# Patient Record
Sex: FEMALE | Race: WHITE | Employment: PART TIME | URBAN - NONMETROPOLITAN AREA
[De-identification: names, ages, dates, MRNs, and addresses within clinical notes are randomized per-mention and may not be internally consistent; named-entity substitution may affect disease eponyms.]

---

## 2018-08-15 ENCOUNTER — OFFICE VISIT (OUTPATIENT)
Dept: CARDIOLOGY CLINIC | Age: 58
End: 2018-08-15
Payer: COMMERCIAL

## 2018-08-15 VITALS
WEIGHT: 191.6 LBS | HEIGHT: 63 IN | SYSTOLIC BLOOD PRESSURE: 142 MMHG | DIASTOLIC BLOOD PRESSURE: 80 MMHG | HEART RATE: 81 BPM | BODY MASS INDEX: 33.95 KG/M2

## 2018-08-15 DIAGNOSIS — E78.5 HYPERLIPIDEMIA, UNSPECIFIED HYPERLIPIDEMIA TYPE: Primary | ICD-10-CM

## 2018-08-15 PROCEDURE — 3017F COLORECTAL CA SCREEN DOC REV: CPT | Performed by: INTERNAL MEDICINE

## 2018-08-15 PROCEDURE — 93000 ELECTROCARDIOGRAM COMPLETE: CPT | Performed by: INTERNAL MEDICINE

## 2018-08-15 PROCEDURE — G8417 CALC BMI ABV UP PARAM F/U: HCPCS | Performed by: INTERNAL MEDICINE

## 2018-08-15 PROCEDURE — G8427 DOCREV CUR MEDS BY ELIG CLIN: HCPCS | Performed by: INTERNAL MEDICINE

## 2018-08-15 PROCEDURE — 99204 OFFICE O/P NEW MOD 45 MIN: CPT | Performed by: INTERNAL MEDICINE

## 2018-08-15 PROCEDURE — 1036F TOBACCO NON-USER: CPT | Performed by: INTERNAL MEDICINE

## 2018-08-15 RX ORDER — EZETIMIBE 10 MG/1
10 TABLET ORAL DAILY
Qty: 30 TABLET | Refills: 3 | Status: SHIPPED | OUTPATIENT
Start: 2018-08-15 | End: 2018-10-03 | Stop reason: SDUPTHER

## 2018-08-15 RX ORDER — LOSARTAN POTASSIUM AND HYDROCHLOROTHIAZIDE 12.5; 1 MG/1; MG/1
1 TABLET ORAL DAILY
COMMUNITY
End: 2020-10-08

## 2018-08-15 RX ORDER — CYCLOBENZAPRINE HCL 10 MG
10 TABLET ORAL 3 TIMES DAILY PRN
COMMUNITY
End: 2022-08-08 | Stop reason: SDUPTHER

## 2018-08-15 NOTE — PROGRESS NOTES
Patient here as new patient-EKG done today
ALKPHOS, ALT, AST, PROT, BILITOT, BILIDIR, IBILI, LABALBU    No results found for: MG    No results found for: INR, PROTIME      No results found for: LABA1C    No results found for: TRIG, HDL, LDLCALC, LDLDIRECT, LABVLDL    No results found for: TSH      Testing Reviewed:      I have individually reviewed the cardiac test below:    ECHO: No results found for this or any previous visit. Assessment/Plan   Hyerplipidemia - likely familial given father history; elevated LDL > 190, LFTs prevent using statins. Will attempt PCSK9, based on insurance coverage. Start Zetia 10 mg q day. The patient was advised on risk/benefits of the new Rx and she agreed to proceed with the medication(s). She is working on diet and exercise. We discussed premarin and its effects on cholesterol levels but she is unable to stop it due to her post-menopausal state.     Disposition:  3 months         Electronically signed by Johanna Hooks MD   8/15/2018 at 8:44 AM

## 2018-08-21 ENCOUNTER — TELEPHONE (OUTPATIENT)
Dept: CARDIOLOGY CLINIC | Age: 58
End: 2018-08-21

## 2018-08-30 LAB
CHOLESTEROL, TOTAL: 283 MG/DL
CHOLESTEROL/HDL RATIO: ABNORMAL
HDLC SERPL-MCNC: 63 MG/DL (ref 35–70)
LDL CHOLESTEROL CALCULATED: 163 MG/DL (ref 0–160)
TRIGL SERPL-MCNC: 284 MG/DL
VLDLC SERPL CALC-MCNC: 57 MG/DL

## 2018-09-04 ENCOUNTER — TELEPHONE (OUTPATIENT)
Dept: CARDIOLOGY CLINIC | Age: 58
End: 2018-09-04

## 2018-10-03 DIAGNOSIS — E78.5 HYPERLIPIDEMIA, UNSPECIFIED HYPERLIPIDEMIA TYPE: ICD-10-CM

## 2018-10-04 DIAGNOSIS — E78.5 HYPERLIPIDEMIA, UNSPECIFIED HYPERLIPIDEMIA TYPE: ICD-10-CM

## 2018-10-05 RX ORDER — EZETIMIBE 10 MG/1
10 TABLET ORAL DAILY
Qty: 90 TABLET | Refills: 3 | Status: SHIPPED | OUTPATIENT
Start: 2018-10-05 | End: 2019-10-16 | Stop reason: SDUPTHER

## 2018-10-05 RX ORDER — EZETIMIBE 10 MG/1
10 TABLET ORAL DAILY
Qty: 90 TABLET | Refills: 3 | Status: SHIPPED | OUTPATIENT
Start: 2018-10-05 | End: 2019-02-20

## 2018-10-16 ENCOUNTER — OFFICE VISIT (OUTPATIENT)
Dept: CARDIOLOGY CLINIC | Age: 58
End: 2018-10-16
Payer: COMMERCIAL

## 2018-10-16 VITALS
DIASTOLIC BLOOD PRESSURE: 64 MMHG | HEIGHT: 62 IN | SYSTOLIC BLOOD PRESSURE: 130 MMHG | HEART RATE: 88 BPM | BODY MASS INDEX: 35.5 KG/M2 | WEIGHT: 192.9 LBS

## 2018-10-16 DIAGNOSIS — E78.5 HYPERLIPIDEMIA, UNSPECIFIED HYPERLIPIDEMIA TYPE: ICD-10-CM

## 2018-10-16 PROCEDURE — 1036F TOBACCO NON-USER: CPT | Performed by: INTERNAL MEDICINE

## 2018-10-16 PROCEDURE — G8428 CUR MEDS NOT DOCUMENT: HCPCS | Performed by: INTERNAL MEDICINE

## 2018-10-16 PROCEDURE — 3017F COLORECTAL CA SCREEN DOC REV: CPT | Performed by: INTERNAL MEDICINE

## 2018-10-16 PROCEDURE — G8484 FLU IMMUNIZE NO ADMIN: HCPCS | Performed by: INTERNAL MEDICINE

## 2018-10-16 PROCEDURE — 99213 OFFICE O/P EST LOW 20 MIN: CPT | Performed by: INTERNAL MEDICINE

## 2018-10-16 PROCEDURE — G8417 CALC BMI ABV UP PARAM F/U: HCPCS | Performed by: INTERNAL MEDICINE

## 2018-10-16 NOTE — PROGRESS NOTES
sore throat. Eyes: Negative for pain, discharge, redness and itching. Respiratory: Negative for apnea, cough  Gastrointestinal: Negative for blood in stool, constipation, diarrhea   Endocrine: Negative for cold intolerance, heat intolerance, polydipsia. Genitourinary: Negative for dysuria, enuresis, flank pain and hematuria. Musculoskeletal: Negative for arthralgias, joint swelling and neck pain. Neurological: Negative for numbness and headaches. Psychiatric/Behavioral: Negative for agitation, confusion, decreased concentration and dysphoric mood. Objective:     /64   Pulse 88   Ht 5' 2\" (1.575 m)   Wt 192 lb 14.4 oz (87.5 kg)   BMI 35.28 kg/m²     Wt Readings from Last 3 Encounters:   10/16/18 192 lb 14.4 oz (87.5 kg)   08/15/18 191 lb 9.6 oz (86.9 kg)     BP Readings from Last 3 Encounters:   10/16/18 130/64   08/15/18 (!) 142/80       Nursing note and vitals reviewed. Physical Exam   Constitutional: Oriented to person, place, and time. Appears well-developed and well-nourished. HENT:   Head: Normocephalic and atraumatic. Eyes: EOM are normal. Pupils are equal, round, and reactive to light. Neck: Normal range of motion. Neck supple. No JVD present. Cardiovascular: Normal rate, regular rhythm, normal heart sounds and intact distal pulses. No murmur heard. Pulmonary/Chest: Effort normal and breath sounds normal. No respiratory distress. No wheezes. No rales. Abdominal: Soft. Bowel sounds are normal. No distension. There is no tenderness. Musculoskeletal: Normal range of motion. No edema. Neurological: Alert and oriented to person, place, and time. No cranial nerve deficit. Coordination normal.   Skin: Skin is warm and dry. Psychiatric: Normal mood and affect.        No results found for: CKTOTAL, CKMB, CKMBINDEX    No results found for: WBC, RBC, HGB, HCT, MCV, MCH, MCHC, RDW, PLT, MPV    No results found for: NA, K, CL, CO2, BUN, LABALBU, CREATININE, CALCIUM, GFRAA, LABGLOM, GLUCOSE    No results found for: ALKPHOS, ALT, AST, PROT, BILITOT, BILIDIR, IBILI, LABALBU    No results found for: MG    No results found for: INR, PROTIME      No results found for: LABA1C    Lab Results   Component Value Date    TRIG 284 08/30/2018    HDL 63 08/30/2018    LDLCALC 163 08/30/2018       No results found for: TSH      Testing Reviewed:      I have individually reviewed the cardiac test below:    ECHO: No results found for this or any previous visit. Assessment/Plan   HLD - continue Zetia.  (previously 227) and  (320). Awaiting Repatha. She likely has familial HLD given that her LDL and TG were high without any other risk factors. Discussed diet/exercise/BP/weight loss/health lifestyle choices/lipids; the patient understands the goals and will try to comply.       Disposition:  3-6 months         Electronically signed by Oumar Lester MD   10/16/2018 at 2:34 PM

## 2018-10-18 DIAGNOSIS — E78.5 HYPERLIPIDEMIA, UNSPECIFIED HYPERLIPIDEMIA TYPE: ICD-10-CM

## 2018-10-22 ENCOUNTER — TELEPHONE (OUTPATIENT)
Dept: CARDIOLOGY CLINIC | Age: 58
End: 2018-10-22

## 2018-10-22 DIAGNOSIS — E78.5 HYPERLIPIDEMIA, UNSPECIFIED HYPERLIPIDEMIA TYPE: Primary | ICD-10-CM

## 2018-10-25 NOTE — TELEPHONE ENCOUNTER
Spoke with [facility] and pt has a high copay   Spoke with pt and she is going to contact [facility] and ask about copay assistance

## 2019-02-20 ENCOUNTER — OFFICE VISIT (OUTPATIENT)
Dept: CARDIOLOGY CLINIC | Age: 59
End: 2019-02-20
Payer: COMMERCIAL

## 2019-02-20 VITALS
DIASTOLIC BLOOD PRESSURE: 88 MMHG | HEART RATE: 100 BPM | HEIGHT: 62 IN | WEIGHT: 187 LBS | SYSTOLIC BLOOD PRESSURE: 144 MMHG | BODY MASS INDEX: 34.41 KG/M2

## 2019-02-20 DIAGNOSIS — E78.5 HYPERLIPIDEMIA, UNSPECIFIED HYPERLIPIDEMIA TYPE: Primary | ICD-10-CM

## 2019-02-20 PROCEDURE — 3017F COLORECTAL CA SCREEN DOC REV: CPT | Performed by: INTERNAL MEDICINE

## 2019-02-20 PROCEDURE — G8417 CALC BMI ABV UP PARAM F/U: HCPCS | Performed by: INTERNAL MEDICINE

## 2019-02-20 PROCEDURE — 99212 OFFICE O/P EST SF 10 MIN: CPT | Performed by: INTERNAL MEDICINE

## 2019-02-20 PROCEDURE — G8484 FLU IMMUNIZE NO ADMIN: HCPCS | Performed by: INTERNAL MEDICINE

## 2019-02-20 PROCEDURE — 1036F TOBACCO NON-USER: CPT | Performed by: INTERNAL MEDICINE

## 2019-02-20 PROCEDURE — G8427 DOCREV CUR MEDS BY ELIG CLIN: HCPCS | Performed by: INTERNAL MEDICINE

## 2019-09-12 DIAGNOSIS — E78.5 HYPERLIPIDEMIA, UNSPECIFIED HYPERLIPIDEMIA TYPE: ICD-10-CM

## 2019-09-12 RX ORDER — EZETIMIBE 10 MG/1
TABLET ORAL
Qty: 90 TABLET | Refills: 3 | Status: SHIPPED | OUTPATIENT
Start: 2019-09-12 | End: 2019-10-16 | Stop reason: SDUPTHER

## 2019-10-16 ENCOUNTER — OFFICE VISIT (OUTPATIENT)
Dept: CARDIOLOGY CLINIC | Age: 59
End: 2019-10-16
Payer: COMMERCIAL

## 2019-10-16 VITALS
SYSTOLIC BLOOD PRESSURE: 122 MMHG | BODY MASS INDEX: 34.93 KG/M2 | HEART RATE: 88 BPM | WEIGHT: 189.8 LBS | DIASTOLIC BLOOD PRESSURE: 62 MMHG | HEIGHT: 62 IN

## 2019-10-16 DIAGNOSIS — E78.5 HYPERLIPIDEMIA, UNSPECIFIED HYPERLIPIDEMIA TYPE: ICD-10-CM

## 2019-10-16 PROCEDURE — 93000 ELECTROCARDIOGRAM COMPLETE: CPT | Performed by: INTERNAL MEDICINE

## 2019-10-16 PROCEDURE — G8427 DOCREV CUR MEDS BY ELIG CLIN: HCPCS | Performed by: INTERNAL MEDICINE

## 2019-10-16 PROCEDURE — 1036F TOBACCO NON-USER: CPT | Performed by: INTERNAL MEDICINE

## 2019-10-16 PROCEDURE — G8417 CALC BMI ABV UP PARAM F/U: HCPCS | Performed by: INTERNAL MEDICINE

## 2019-10-16 PROCEDURE — G8484 FLU IMMUNIZE NO ADMIN: HCPCS | Performed by: INTERNAL MEDICINE

## 2019-10-16 PROCEDURE — 99213 OFFICE O/P EST LOW 20 MIN: CPT | Performed by: INTERNAL MEDICINE

## 2019-10-16 PROCEDURE — 3017F COLORECTAL CA SCREEN DOC REV: CPT | Performed by: INTERNAL MEDICINE

## 2019-10-16 RX ORDER — EZETIMIBE 10 MG/1
10 TABLET ORAL DAILY
Qty: 90 TABLET | Refills: 3 | Status: SHIPPED | OUTPATIENT
Start: 2019-10-16 | End: 2020-09-21 | Stop reason: SDUPTHER

## 2019-10-18 DIAGNOSIS — E78.5 HYPERLIPIDEMIA, UNSPECIFIED HYPERLIPIDEMIA TYPE: ICD-10-CM

## 2019-11-11 DIAGNOSIS — E78.5 HYPERLIPIDEMIA, UNSPECIFIED HYPERLIPIDEMIA TYPE: ICD-10-CM

## 2020-05-04 ENCOUNTER — HOSPITAL ENCOUNTER (OUTPATIENT)
Dept: CT IMAGING | Age: 60
Discharge: HOME | End: 2020-05-04
Payer: COMMERCIAL

## 2020-05-04 PROCEDURE — 74176 CT ABD & PELVIS W/O CONTRAST: CPT

## 2020-06-22 NOTE — TELEPHONE ENCOUNTER
Pt received a letter from her insurance [employer] that they are no longer covering the [address]  any longer and recommended she go to  [facility].   Please advise pt at [phone]

## 2020-06-23 RX ORDER — ALIROCUMAB 75 MG/ML
75 INJECTION, SOLUTION SUBCUTANEOUS
COMMUNITY
End: 2020-06-23 | Stop reason: SDUPTHER

## 2020-06-23 RX ORDER — ALIROCUMAB 75 MG/ML
75 INJECTION, SOLUTION SUBCUTANEOUS
Qty: 2 PEN | Refills: 11 | Status: SHIPPED | OUTPATIENT
Start: 2020-06-23 | End: 2021-06-29 | Stop reason: SDUPTHER

## 2020-09-22 ENCOUNTER — TELEPHONE (OUTPATIENT)
Dept: CARDIOLOGY CLINIC | Age: 60
End: 2020-09-22

## 2020-09-22 ENCOUNTER — OFFICE VISIT (OUTPATIENT)
Dept: CARDIOLOGY CLINIC | Age: 60
End: 2020-09-22
Payer: COMMERCIAL

## 2020-09-22 VITALS
DIASTOLIC BLOOD PRESSURE: 74 MMHG | HEART RATE: 92 BPM | HEIGHT: 62 IN | SYSTOLIC BLOOD PRESSURE: 146 MMHG | WEIGHT: 191 LBS | BODY MASS INDEX: 35.15 KG/M2

## 2020-09-22 LAB
CHOLESTEROL, TOTAL: 239 MG/DL
CHOLESTEROL/HDL RATIO: NORMAL
HDLC SERPL-MCNC: 58 MG/DL (ref 35–70)
LDL CHOLESTEROL CALCULATED: 125 MG/DL (ref 0–160)
NONHDLC SERPL-MCNC: NORMAL MG/DL
TRIGL SERPL-MCNC: 317 MG/DL
VLDLC SERPL CALC-MCNC: 56 MG/DL

## 2020-09-22 PROCEDURE — 99212 OFFICE O/P EST SF 10 MIN: CPT | Performed by: INTERNAL MEDICINE

## 2020-09-22 PROCEDURE — 93000 ELECTROCARDIOGRAM COMPLETE: CPT | Performed by: INTERNAL MEDICINE

## 2020-09-22 RX ORDER — EZETIMIBE 10 MG/1
10 TABLET ORAL DAILY
Qty: 7 TABLET | Refills: 0 | Status: SHIPPED | OUTPATIENT
Start: 2020-09-22 | End: 2020-09-30 | Stop reason: SDUPTHER

## 2020-09-22 NOTE — PROGRESS NOTES
[address] [place] [address] [address]  [place] [address]  Dept: [phone]  Dept Fax: [phone]  Loc: [phone]    Visit Date: 2020    Ms. Rosamaria Garcia is a 63 y.o. female  who presented for:  Chief Complaint   Patient presents with    1 Year Follow Up       HPI:   HPI   62 yo F with Praluent/Zetia who presents for follow-up  TG and LDL elevated. Just started the second round. She is working on diet and exercise. No chest pain, angina, FRANCES, orthopnea, PND, sob at rest, palpitations, LE edema, or syncope. Current Outpatient Medications:     ezetimibe (ZETIA) 10 MG tablet, Take 1 tablet by mouth daily, Disp: 7 tablet, Rfl: 0    alirocumab (PRALUENT) 75 MG/ML SOAJ injection pen, Inject 1 mL into the skin every 14 days, Disp: 2 pen, Rfl: 11    estrogens, conjugated, (PREMARIN) 0.3 MG tablet, Take 0.3 mg by mouth daily, Disp: , Rfl:     cyclobenzaprine (FLEXERIL) 10 MG tablet, Take 10 mg by mouth 3 times daily as needed for Muscle spasms, Disp: , Rfl:     losartan-hydrochlorothiazide (HYZAAR) 100-12.5 MG per tablet, Take 1 tablet by mouth daily, Disp: , Rfl:     Past Medical History  Che Walton  has a past medical history of Hyperlipidemia and Hypertension. Social History  Mchugh  reports that she has never smoked. She has never used smokeless tobacco. She reports that she does not drink alcohol or use drugs. Family History  Mchugh family history is not on file. There is no family history of bicuspid aortic valve, aneurysms, heart transplant, pacemakers, defibrillators, or sudden cardiac death. Past Surgical History   Past Surgical History:   Procedure Laterality Date     SECTION       x 3    CHOLECYSTECTOMY      HYSTERECTOMY      JOINT REPLACEMENT Bilateral        Review of Systems   Constitutional: Negative for chills and fever  HENT: Negative for congestion, sinus pressure, sneezing and sore throat.     Eyes: Negative for pain, discharge, redness and itching. Respiratory: Negative for apnea, cough  Gastrointestinal: Negative for blood in stool, constipation, diarrhea   Endocrine: Negative for cold intolerance, heat intolerance, polydipsia. Genitourinary: Negative for dysuria, enuresis, flank pain and hematuria. Musculoskeletal: Negative for arthralgias, joint swelling and neck pain. Neurological: Negative for numbness and headaches. Psychiatric/Behavioral: Negative for agitation, confusion, decreased concentration and dysphoric mood. Objective:     BP (!) 146/74   Pulse 92   Ht 5' 2\" (1.575 m)   Wt 191 lb (86.6 kg)   BMI 34.93 kg/m²     Wt Readings from Last 3 Encounters:   09/22/20 191 lb (86.6 kg)   10/16/19 189 lb 12.8 oz (86.1 kg)   02/20/19 187 lb (84.8 kg)     BP Readings from Last 3 Encounters:   09/22/20 (!) 146/74   10/16/19 122/62   02/20/19 (!) 144/88       Nursing note and vitals reviewed. Physical Exam   Constitutional: Oriented to person, place, and time. Appears well-developed and well-nourished. HENT:   Head: Normocephalic and atraumatic. Eyes: EOM are normal. Pupils are equal, round, and reactive to light. Neck: Normal range of motion. Neck supple. No JVD present. Cardiovascular: Normal rate, regular rhythm, normal heart sounds and intact distal pulses. No murmur heard. Pulmonary/Chest: Effort normal and breath sounds normal. No respiratory distress. No wheezes. No rales. Abdominal: Soft. Bowel sounds are normal. No distension. There is no tenderness. Musculoskeletal: Normal range of motion. No edema. Neurological: Alert and oriented to person, place, and time. No cranial nerve deficit. Coordination normal.   Skin: Skin is warm and dry. Psychiatric: Normal mood and affect.        No results found for: CKTOTAL, CKMB, CKMBINDEX    No results found for: WBC, RBC, HGB, HCT, MCV, MCH, MCHC, RDW, PLT, MPV    No results found for: NA, K, CL, CO2, BUN, LABALBU, CREATININE, CALCIUM, GFRAA, LABGLOM, GLUCOSE    No results found for: ALKPHOS, ALT, AST, PROT, BILITOT, BILIDIR, IBILI, LABALBU    No results found for: MG    No results found for: INR, PROTIME      No results found for: LABA1C    Lab Results   Component Value Date    TRIG 317 09/22/2020    HDL 58 09/22/2020    LDLCALC 125 09/22/2020       No results found for: TSH      Testing Reviewed:      I have individually reviewed the cardiac test below:    ECHO: No results found for this or any previous visit. Assessment/Plan   HLD - Now on Praluent/Zetia, will continue to follow, re-check FLP, she is very stressed, BP relatively controlled but she is stressed. Discussed diet/exercise/BP/weight loss/health lifestyle choices/lipids; the patient understands the goals and will try to comply.     Disposition:  12 months         Electronically signed by Anjel Azevedo MD   9/22/2020 at 1:55 PM EDT

## 2020-09-30 RX ORDER — EZETIMIBE 10 MG/1
10 TABLET ORAL DAILY
Qty: 90 TABLET | Refills: 3 | Status: SHIPPED | OUTPATIENT
Start: 2020-09-30 | End: 2021-09-17 | Stop reason: SDUPTHER

## 2020-10-06 ENCOUNTER — HOSPITAL ENCOUNTER (OUTPATIENT)
Dept: GENERAL RADIOLOGY | Age: 60
Discharge: HOME | End: 2020-10-06
Payer: COMMERCIAL

## 2020-10-06 ENCOUNTER — HOSPITAL ENCOUNTER (OUTPATIENT)
Age: 60
Setting detail: SPECIMEN
Discharge: HOME | End: 2020-10-06
Payer: COMMERCIAL

## 2020-10-06 ENCOUNTER — HOSPITAL ENCOUNTER (OUTPATIENT)
Age: 60
Discharge: HOME | End: 2020-10-06
Payer: COMMERCIAL

## 2020-10-06 PROCEDURE — 71046 X-RAY EXAM CHEST 2 VIEWS: CPT

## 2020-10-06 PROCEDURE — U0003 INFECTIOUS AGENT DETECTION BY NUCLEIC ACID (DNA OR RNA); SEVERE ACUTE RESPIRATORY SYNDROME CORONAVIRUS 2 (SARS-COV-2) (CORONAVIRUS DISEASE [COVID-19]), AMPLIFIED PROBE TECHNIQUE, MAKING USE OF HIGH THROUGHPUT TECHNOLOGIES AS DESCRIBED BY CMS-2020-01-R: HCPCS

## 2020-10-08 LAB — SARS-COV-2: NOT DETECTED

## 2020-10-08 RX ORDER — LOSARTAN POTASSIUM 100 MG/1
100 TABLET ORAL DAILY
COMMUNITY
End: 2021-08-05

## 2020-10-08 RX ORDER — HYDROCHLOROTHIAZIDE 12.5 MG/1
12.5 CAPSULE, GELATIN COATED ORAL DAILY
COMMUNITY
End: 2021-05-20

## 2020-10-08 RX ORDER — MONTELUKAST SODIUM 10 MG/1
10 TABLET ORAL NIGHTLY
COMMUNITY
End: 2021-09-17 | Stop reason: SDUPTHER

## 2020-10-08 NOTE — PROGRESS NOTES
Covid screening questionnaire complete and negative for symptoms or exposure see chart for documentation  covid test completed - pt is negative

## 2020-10-08 NOTE — PROGRESS NOTES
NPO after midnight except sip of water with heart/BP meds  Follow all instructions given by surgeon including medications to hold  Bring insurance card and photo ID  Shower the night before or morning of procedure with liquid antibacterial soap  Wear comfortable clothing  Do not bring jewelry or valuables  Bring list of medications with dosage and how often taken if not reviewed   needed at discharge at least 25years old  Call PAT at [phone] for questions    Instructed to call surgery center at [phone] upon arrival to speak with  before entering building. Covid screen due  at [facility] 6 to 7 days before procedure. Xray reveals weakening of the bones and arthritis. Jaclyn Nice to have DEXA scan to make sure no osteoporosis (which can increase risk of fracture). DEXA scan order place last spring. Please place order for DEXA dx: osteopenia  How is patient's pain doing?

## 2020-10-12 ENCOUNTER — ANESTHESIA EVENT (OUTPATIENT)
Dept: OPERATING ROOM | Age: 60
End: 2020-10-12
Payer: COMMERCIAL

## 2020-10-13 ENCOUNTER — HOSPITAL ENCOUNTER (OUTPATIENT)
Age: 60
Setting detail: OUTPATIENT SURGERY
Discharge: HOME | End: 2020-10-13
Attending: OPHTHALMOLOGY | Admitting: OPHTHALMOLOGY
Payer: COMMERCIAL

## 2020-10-13 ENCOUNTER — ANESTHESIA (OUTPATIENT)
Dept: OPERATING ROOM | Age: 60
End: 2020-10-13
Payer: COMMERCIAL

## 2020-10-13 VITALS
OXYGEN SATURATION: 98 % | SYSTOLIC BLOOD PRESSURE: 122 MMHG | TEMPERATURE: 97.7 F | DIASTOLIC BLOOD PRESSURE: 60 MMHG | RESPIRATION RATE: 30 BRPM

## 2020-10-13 VITALS
HEART RATE: 89 BPM | DIASTOLIC BLOOD PRESSURE: 56 MMHG | BODY MASS INDEX: 35.44 KG/M2 | RESPIRATION RATE: 12 BRPM | WEIGHT: 192.6 LBS | OXYGEN SATURATION: 95 % | HEIGHT: 62 IN | SYSTOLIC BLOOD PRESSURE: 118 MMHG | TEMPERATURE: 96.8 F

## 2020-10-13 PROCEDURE — 6370000000 HC RX 637 (ALT 250 FOR IP): Performed by: OPHTHALMOLOGY

## 2020-10-13 PROCEDURE — 7100000001 HC PACU RECOVERY - ADDTL 15 MIN: Performed by: OPHTHALMOLOGY

## 2020-10-13 PROCEDURE — 7100000011 HC PHASE II RECOVERY - ADDTL 15 MIN: Performed by: OPHTHALMOLOGY

## 2020-10-13 PROCEDURE — 2720000010 HC SURG SUPPLY STERILE: Performed by: OPHTHALMOLOGY

## 2020-10-13 PROCEDURE — 3700000000 HC ANESTHESIA ATTENDED CARE: Performed by: OPHTHALMOLOGY

## 2020-10-13 PROCEDURE — 3700000001 HC ADD 15 MINUTES (ANESTHESIA): Performed by: OPHTHALMOLOGY

## 2020-10-13 PROCEDURE — 2500000003 HC RX 250 WO HCPCS: Performed by: NURSE ANESTHETIST, CERTIFIED REGISTERED

## 2020-10-13 PROCEDURE — 2500000003 HC RX 250 WO HCPCS

## 2020-10-13 PROCEDURE — 6360000002 HC RX W HCPCS: Performed by: NURSE ANESTHETIST, CERTIFIED REGISTERED

## 2020-10-13 PROCEDURE — 7100000000 HC PACU RECOVERY - FIRST 15 MIN: Performed by: OPHTHALMOLOGY

## 2020-10-13 PROCEDURE — 6370000000 HC RX 637 (ALT 250 FOR IP)

## 2020-10-13 PROCEDURE — V2632 POST CHMBR INTRAOCULAR LENS: HCPCS | Performed by: OPHTHALMOLOGY

## 2020-10-13 PROCEDURE — 2500000003 HC RX 250 WO HCPCS: Performed by: OPHTHALMOLOGY

## 2020-10-13 PROCEDURE — 2709999900 HC NON-CHARGEABLE SUPPLY: Performed by: OPHTHALMOLOGY

## 2020-10-13 PROCEDURE — 7100000010 HC PHASE II RECOVERY - FIRST 15 MIN: Performed by: OPHTHALMOLOGY

## 2020-10-13 PROCEDURE — 3600000003 HC SURGERY LEVEL 3 BASE: Performed by: OPHTHALMOLOGY

## 2020-10-13 PROCEDURE — 2580000003 HC RX 258: Performed by: OPHTHALMOLOGY

## 2020-10-13 PROCEDURE — 3600000013 HC SURGERY LEVEL 3 ADDTL 15MIN: Performed by: OPHTHALMOLOGY

## 2020-10-13 PROCEDURE — 6360000002 HC RX W HCPCS: Performed by: OPHTHALMOLOGY

## 2020-10-13 DEVICE — Z DUP USE 2247921 LENS INTOCU +5.0 TO +34.0 DIOPT L13MM DIA6MM UV BLK: Type: IMPLANTABLE DEVICE | Site: EYE | Status: FUNCTIONAL

## 2020-10-13 RX ORDER — LIDOCAINE HYDROCHLORIDE 20 MG/ML
INJECTION, SOLUTION EPIDURAL; INFILTRATION; INTRACAUDAL; PERINEURAL PRN
Status: DISCONTINUED | OUTPATIENT
Start: 2020-10-13 | End: 2020-10-13 | Stop reason: SDUPTHER

## 2020-10-13 RX ORDER — APRACLONIDINE HYDROCHLORIDE 5 MG/ML
SOLUTION/ DROPS OPHTHALMIC PRN
Status: DISCONTINUED | OUTPATIENT
Start: 2020-10-13 | End: 2020-10-13 | Stop reason: ALTCHOICE

## 2020-10-13 RX ORDER — PROPOFOL 10 MG/ML
INJECTION, EMULSION INTRAVENOUS PRN
Status: DISCONTINUED | OUTPATIENT
Start: 2020-10-13 | End: 2020-10-13 | Stop reason: SDUPTHER

## 2020-10-13 RX ORDER — TROPICAMIDE 10 MG/ML
SOLUTION/ DROPS OPHTHALMIC
Status: COMPLETED
Start: 2020-10-13 | End: 2020-10-13

## 2020-10-13 RX ORDER — BUPIVACAINE HYDROCHLORIDE 7.5 MG/ML
1 INJECTION, SOLUTION EPIDURAL; RETROBULBAR EVERY 5 MIN PRN
Status: COMPLETED | OUTPATIENT
Start: 2020-10-13 | End: 2020-10-13

## 2020-10-13 RX ORDER — ONDANSETRON 2 MG/ML
INJECTION INTRAMUSCULAR; INTRAVENOUS PRN
Status: DISCONTINUED | OUTPATIENT
Start: 2020-10-13 | End: 2020-10-13 | Stop reason: SDUPTHER

## 2020-10-13 RX ORDER — KETOROLAC TROMETHAMINE 5 MG/ML
SOLUTION OPHTHALMIC
Status: COMPLETED
Start: 2020-10-13 | End: 2020-10-13

## 2020-10-13 RX ORDER — BUPIVACAINE HYDROCHLORIDE 7.5 MG/ML
INJECTION, SOLUTION EPIDURAL; RETROBULBAR
Status: COMPLETED
Start: 2020-10-13 | End: 2020-10-13

## 2020-10-13 RX ORDER — MORPHINE SULFATE 2 MG/ML
2 INJECTION, SOLUTION INTRAMUSCULAR; INTRAVENOUS EVERY 5 MIN PRN
Status: DISCONTINUED | OUTPATIENT
Start: 2020-10-13 | End: 2020-10-13 | Stop reason: HOSPADM

## 2020-10-13 RX ORDER — PHENYLEPHRINE HCL 2.5 %
1 DROPS OPHTHALMIC (EYE) EVERY 5 MIN PRN
Status: COMPLETED | OUTPATIENT
Start: 2020-10-13 | End: 2020-10-13

## 2020-10-13 RX ORDER — SODIUM CHLORIDE 9 MG/ML
INJECTION, SOLUTION INTRAVENOUS CONTINUOUS
Status: DISCONTINUED | OUTPATIENT
Start: 2020-10-13 | End: 2020-10-13 | Stop reason: HOSPADM

## 2020-10-13 RX ORDER — ACETAMINOPHEN 500 MG
1000 TABLET ORAL
Status: COMPLETED | OUTPATIENT
Start: 2020-10-13 | End: 2020-10-13

## 2020-10-13 RX ORDER — LIDOCAINE HYDROCHLORIDE 10 MG/ML
INJECTION, SOLUTION EPIDURAL; INFILTRATION; INTRACAUDAL; PERINEURAL PRN
Status: DISCONTINUED | OUTPATIENT
Start: 2020-10-13 | End: 2020-10-13 | Stop reason: ALTCHOICE

## 2020-10-13 RX ORDER — DEXAMETHASONE SODIUM PHOSPHATE 4 MG/ML
INJECTION, SOLUTION INTRA-ARTICULAR; INTRALESIONAL; INTRAMUSCULAR; INTRAVENOUS; SOFT TISSUE PRN
Status: DISCONTINUED | OUTPATIENT
Start: 2020-10-13 | End: 2020-10-13 | Stop reason: SDUPTHER

## 2020-10-13 RX ORDER — TROPICAMIDE 10 MG/ML
1 SOLUTION/ DROPS OPHTHALMIC EVERY 5 MIN PRN
Status: COMPLETED | OUTPATIENT
Start: 2020-10-13 | End: 2020-10-13

## 2020-10-13 RX ORDER — LABETALOL 20 MG/4 ML (5 MG/ML) INTRAVENOUS SYRINGE
10 EVERY 10 MIN PRN
Status: DISCONTINUED | OUTPATIENT
Start: 2020-10-13 | End: 2020-10-13 | Stop reason: HOSPADM

## 2020-10-13 RX ORDER — LATANOPROST 50 UG/ML
SOLUTION/ DROPS OPHTHALMIC PRN
Status: DISCONTINUED | OUTPATIENT
Start: 2020-10-13 | End: 2020-10-13 | Stop reason: ALTCHOICE

## 2020-10-13 RX ORDER — PHENYLEPHRINE HCL 2.5 %
DROPS OPHTHALMIC (EYE)
Status: COMPLETED
Start: 2020-10-13 | End: 2020-10-13

## 2020-10-13 RX ORDER — FENTANYL CITRATE 50 UG/ML
INJECTION, SOLUTION INTRAMUSCULAR; INTRAVENOUS PRN
Status: DISCONTINUED | OUTPATIENT
Start: 2020-10-13 | End: 2020-10-13 | Stop reason: SDUPTHER

## 2020-10-13 RX ORDER — KETOROLAC TROMETHAMINE 5 MG/ML
1 SOLUTION OPHTHALMIC EVERY 5 MIN PRN
Status: COMPLETED | OUTPATIENT
Start: 2020-10-13 | End: 2020-10-13

## 2020-10-13 RX ORDER — FENTANYL CITRATE 50 UG/ML
50 INJECTION, SOLUTION INTRAMUSCULAR; INTRAVENOUS EVERY 5 MIN PRN
Status: DISCONTINUED | OUTPATIENT
Start: 2020-10-13 | End: 2020-10-13 | Stop reason: HOSPADM

## 2020-10-13 RX ADMIN — Medication 1 DROP: at 06:42

## 2020-10-13 RX ADMIN — Medication 1 DROP: at 06:36

## 2020-10-13 RX ADMIN — PHENYLEPHRINE HYDROCHLORIDE 1 DROP: 25 SOLUTION/ DROPS OPHTHALMIC at 06:36

## 2020-10-13 RX ADMIN — ONDANSETRON HYDROCHLORIDE 4 MG: 4 INJECTION, SOLUTION INTRAMUSCULAR; INTRAVENOUS at 07:31

## 2020-10-13 RX ADMIN — KETOROLAC TROMETHAMINE 1 DROP: 5 SOLUTION OPHTHALMIC at 06:47

## 2020-10-13 RX ADMIN — FENTANYL CITRATE 50 MCG: 50 INJECTION, SOLUTION INTRAMUSCULAR; INTRAVENOUS at 07:36

## 2020-10-13 RX ADMIN — LIDOCAINE HYDROCHLORIDE 40 MG: 20 INJECTION, SOLUTION EPIDURAL; INFILTRATION; INTRACAUDAL; PERINEURAL at 07:20

## 2020-10-13 RX ADMIN — ACETAMINOPHEN 1000 MG: 500 TABLET ORAL at 08:39

## 2020-10-13 RX ADMIN — FENTANYL CITRATE 50 MCG: 50 INJECTION, SOLUTION INTRAMUSCULAR; INTRAVENOUS at 07:18

## 2020-10-13 RX ADMIN — Medication 1 DROP: at 06:47

## 2020-10-13 RX ADMIN — PROPOFOL 30 MG: 10 INJECTION, EMULSION INTRAVENOUS at 07:31

## 2020-10-13 RX ADMIN — TROPICAMIDE 1 DROP: 10 SOLUTION/ DROPS OPHTHALMIC at 06:51

## 2020-10-13 RX ADMIN — PROPOFOL 150 MG: 10 INJECTION, EMULSION INTRAVENOUS at 07:20

## 2020-10-13 RX ADMIN — TROPICAMIDE 1 DROP: 10 SOLUTION/ DROPS OPHTHALMIC at 06:36

## 2020-10-13 RX ADMIN — KETOROLAC TROMETHAMINE 1 DROP: 5 SOLUTION OPHTHALMIC at 06:36

## 2020-10-13 RX ADMIN — BUPIVACAINE HYDROCHLORIDE 0.38 MG: 7.5 INJECTION, SOLUTION EPIDURAL; RETROBULBAR at 06:42

## 2020-10-13 RX ADMIN — DEXAMETHASONE SODIUM PHOSPHATE 8 MG: 4 INJECTION, SOLUTION INTRAMUSCULAR; INTRAVENOUS at 07:31

## 2020-10-13 RX ADMIN — TROPICAMIDE 1 DROP: 10 SOLUTION/ DROPS OPHTHALMIC at 06:57

## 2020-10-13 RX ADMIN — SODIUM CHLORIDE: 9 INJECTION, SOLUTION INTRAVENOUS at 07:16

## 2020-10-13 RX ADMIN — BUPIVACAINE HYDROCHLORIDE 0.38 MG: 7.5 INJECTION, SOLUTION EPIDURAL; RETROBULBAR at 06:47

## 2020-10-13 RX ADMIN — BUPIVACAINE HYDROCHLORIDE 0.38 MG: 7.5 INJECTION, SOLUTION EPIDURAL; RETROBULBAR at 06:36

## 2020-10-13 RX ADMIN — TROPICAMIDE 1 DROP: 10 SOLUTION/ DROPS OPHTHALMIC at 06:47

## 2020-10-13 RX ADMIN — KETOROLAC TROMETHAMINE 1 DROP: 5 SOLUTION/ DROPS OPHTHALMIC at 06:36

## 2020-10-13 RX ADMIN — KETOROLAC TROMETHAMINE 1 DROP: 5 SOLUTION OPHTHALMIC at 06:42

## 2020-10-13 RX ADMIN — TROPICAMIDE 1 DROP: 10 SOLUTION/ DROPS OPHTHALMIC at 06:42

## 2020-10-13 ASSESSMENT — PAIN SCALES - GENERAL
PAINLEVEL_OUTOF10: 2
PAINLEVEL_OUTOF10: 0

## 2020-10-13 ASSESSMENT — PAIN - FUNCTIONAL ASSESSMENT: PAIN_FUNCTIONAL_ASSESSMENT: 0-10

## 2020-10-13 NOTE — PROGRESS NOTES
Discharge instruction sheet left here. This RN spoke with daughter and patient states she does not need the instructions.

## 2020-10-13 NOTE — OP NOTE
Operative Note      Patient: Ashley Andrade  YOB: 1960  MRN: [id number]    Date of Procedure: 10/13/2020    Pre-Op Diagnosis: LEFT EYE CATARACT    Post-Op Diagnosis: Same       Procedure(s):  LEFT EYE CATARACT    Surgeon(s):  Sanjana Khoury MD    Assistant:   * No surgical staff found *    Anesthesia: General    Estimated Blood Loss (mL): Minimal    Complications: None    Specimens:   * No specimens in log *    Implants:  Implant Name Type Inv.  Item Serial No.  Lot No. LRB No. Used Action   LENS IOL Keyur Seis 1266 1P TECNIS [id number] - [id number] Eye LENS IOL Keyur Seis 1266 [facility] [id number] [employer]  Left 1 Implanted         Drains: * No LDAs found *    Findings: none    Detailed Description of Procedure:   See procedure note    Electronically signed by Sanjana Khoury MD on 10/13/2020 at 7:53 AM

## 2020-10-13 NOTE — ANESTHESIA PRE PROCEDURE
Department of Anesthesiology  Preprocedure Note       Name:  Helen Mcallister   Age:  63 y.o.  :  1960                                          MRN:  [id number]         Date:  10/13/2020      Surgeon: Zan Dave):  Anjali Curiel MD    Procedure: Procedure(s):  LEFT EYE CATARACT    Medications prior to admission:   Prior to Admission medications    Medication Sig Start Date End Date Taking? Authorizing Provider   losartan (COZAAR) 100 MG tablet Take 100 mg by mouth daily   Yes Historical Provider, MD   hydroCHLOROthiazide (MICROZIDE) 12.5 MG capsule Take 12.5 mg by mouth daily   Yes Historical Provider, MD   montelukast (SINGULAIR) 10 MG tablet Take 10 mg by mouth nightly   Yes Historical Provider, MD   ezetimibe (ZETIA) 10 MG tablet Take 1 tablet by mouth daily 20  Yes Cali Quarles MD   alirocumab (PRALUENT) 75 MG/ML SOAJ injection pen Inject 1 mL into the skin every 14 days 20  Yes Viktoria Salgado PA-C   estrogens, conjugated, (PREMARIN) 0.3 MG tablet Take 0.3 mg by mouth 2x week   Yes Historical Provider, MD   cyclobenzaprine (FLEXERIL) 10 MG tablet Take 10 mg by mouth 3 times daily as needed for Muscle spasms   Yes Historical Provider, MD       Current medications:    Current Facility-Administered Medications   Medication Dose Route Frequency Provider Last Rate Last Dose    acetaminophen (TYLENOL) tablet 1,000 mg  1,000 mg Oral Once PRN Anjali Curiel MD        0.9 % sodium chloride infusion   Intravenous Continuous Anjali Curiel MD           Allergies: Allergies   Allergen Reactions    Valium [Diazepam] Other (See Comments)     Patient gets very irritated       Problem List:  There is no problem list on file for this patient.       Past Medical History:        Diagnosis Date    Hyperlipidemia     Hypertension        Past Surgical History:        Procedure Laterality Date     SECTION       x 3     SECTION      x3    CHOLECYSTECTOMY      HYSTERECTOMY opening: > = 3 FB Dental:          Pulmonary: breath sounds clear to auscultation                             Cardiovascular:    (+) hypertension:,         Rhythm: regular                      Neuro/Psych:               GI/Hepatic/Renal:   (+) morbid obesity          Endo/Other:                     Abdominal:   (+) obese,         Vascular:                                        Anesthesia Plan      general     ASA 2       Induction: intravenous. MIPS: Postoperative opioids intended and Prophylactic antiemetics administered. Anesthetic plan and risks discussed with patient and child/children. Plan discussed with CRNA.                   Francine Mcgill MD   10/13/2020

## 2020-10-13 NOTE — ANESTHESIA PRE-OP
[address] [address]  Pre-Sedation/Analgesia History & Physical    Pt Name: Mouna Robertson  MRN: [id number]  YOB: 1960  Provider Performing Procedure: Aguilar Noe  Primary Care Physician: Sylvia Ramirez,     PRE-PROCEDURE   DNR-CCA/DNR-CC : No  Brief History/Pre-Procedure Diagnosis: Combined cataract left eye      MEDICAL HISTORY       has a past medical history of Hyperlipidemia and Hypertension. SURGICAL HISTORY   has a past surgical history that includes  section; Hysterectomy; Cholecystectomy; joint replacement (Bilateral); and  section. ALLERGIES   Allergies as of 10/01/2020 - Review Complete 2020   Allergen Reaction Noted    Valium [diazepam] Other (See Comments) 10/16/2018       MEDICATIONS   Coumadin Use Last 7 Days No  Antiplatelet drug therapy use last 7 days  No  Other anticoagulant use last 7 days  No  Prior to Admission medications    Medication Sig Start Date End Date Taking?  Authorizing Provider   losartan (COZAAR) 100 MG tablet Take 100 mg by mouth daily   Yes Historical Provider, MD   hydroCHLOROthiazide (MICROZIDE) 12.5 MG capsule Take 12.5 mg by mouth daily   Yes Historical Provider, MD   montelukast (SINGULAIR) 10 MG tablet Take 10 mg by mouth nightly   Yes Historical Provider, MD   ezetimibe (ZETIA) 10 MG tablet Take 1 tablet by mouth daily 20  Yes Claude Salinas, MD   alirocumab (PRALUENT) 75 MG/ML SOAJ injection pen Inject 1 mL into the skin every 14 days 20  Yes Rozina Sosa PA-C   estrogens, conjugated, (PREMARIN) 0.3 MG tablet Take 0.3 mg by mouth 2x week   Yes Historical Provider, MD   cyclobenzaprine (FLEXERIL) 10 MG tablet Take 10 mg by mouth 3 times daily as needed for Muscle spasms   Yes Historical Provider, MD     PHYSICAL:   Vitals:    10/13/20 0632   BP: (!) 145/73   Pulse: 72   Resp: 16   Temp: 96.6 °F (35.9 °C)   SpO2: 98%     Mental Status: alert and oriented   Heart:  Regular rate and rhythm    Lungs:  Clear to ausculation   Abdomen: soft, non tender   PLANNED PROCEDURE   Phacoemulsification cataract extraction with intraocular lens, left eye   SEDATION  Planned agent:general anesthesia  ASA Classification: 2  Class 1: A normal healthy patient  Class 2: Pt with mild to moderate systemic disease  Class 3: Severe systemic disease or disturbance  Class 4: Severe systemic disorders that are already life threatening. Class 5: Moribund pt with little chances of survival, for more than 24 hours. Mallampati I Airway Classification: 3    1. Pre-procedure diagnostic studies complete and results available. Comment:    2. Previous sedation/anesthesia experiences assessed. Comment:    3. The patient is an appropriate candidate to undergo the planned procedure sedation and anesthesia. (Refer to nursing sedation/analgesia documentation record)  4. Formulation and discussion of sedation/procedure plan, risks, and expectations with patient and/or responsible adult completed. 5. Patient examined immediately prior to the procedure.  (Refer to nursing sedation/analgesia documentation record)    Yayo Henriquez  Electronically signed 10/13/2020 at 7:14 AM

## 2020-10-13 NOTE — ANESTHESIA POSTPROCEDURE EVALUATION
Department of Anesthesiology  Postprocedure Note    Patient: Eunice Kinney  MRN: [id number]  YOB: 1960  Date of evaluation: 10/13/2020  Time:  11:25 AM     Procedure Summary     Date:  10/13/20 Room / Location:  [address] [place] [address] 04 / 138 [facility]    Anesthesia Start:  5825 Anesthesia Stop:  0800    Procedure:  EXTRACAPSULAR CATARACT REMOVAL WITH INSERTION OF INTRAOCULAR LENS PROTHESIS LEFT (Left Eye) Diagnosis:  (LEFT EYE CATARACT)    Surgeon:  Garrison Worrell MD Responsible Provider:  Samm Smith MD    Anesthesia Type:  general ASA Status:  2          Anesthesia Type: general    Brandy Phase I: Brandy Score: 10    Brandy Phase II: Brandy Score: 10    Last vitals: Reviewed and per EMR flowsheets.        Anesthesia Post Evaluation    Patient location during evaluation: PACU  Patient participation: complete - patient participated  Level of consciousness: awake  Airway patency: patent  Nausea & Vomiting: no vomiting and no nausea  Complications: no  Cardiovascular status: hemodynamically stable  Respiratory status: acceptable  Hydration status: stable

## 2020-10-13 NOTE — OP NOTE
[facility] [address]  RECORD OF OPERATION                       10/13/2020    Patient: Nely Tucker  : 1960  Acct#: [de-identified]    PRE-OPERATIVE DIAGNOSIS:  Cataract, OS    POST-OPERATIVE DIAGNOSIS:  same    OPERATION PERFORMED:  Phacoemulsification cataract extraction with posterior chamber intraocular lens, OS    SURGEON:  Alma Green MD    ANESTHESIA:  General    COMPLICATIONS:  None    ESTIMATED BLOOD LOSS: None    LENS INFORMATION: Tecnis ZCB00  Power 24.0    INDICATION AND CONSENT:  The patient was found to have a visually significant cataract affecting their activities of daily living which is not adequately correctable by a change in spectacles. The risks and options of cataract surgery including observation were discussed. Consent was obtained and the patient requests to proceed. General anesthesia was indicated due to patient's extreme claustrophobia and previous failure to carry through with procedure under topical/sedation. OPERATIVE TECHNIQUE:  In the operating room, the patient was intubated and sedated. The operative eye was prepped and draped in the usual sterile ophthalmic fashion and a lid speculum was inserted. A paracentesis incision was made at the superior temporal limbus. 0.2-0.4cc of 1% Lidocaine was instilled into the anterior chamber followed by viscoelastic. A keratome was used to produce a 2.8 mm chord-length incision beginning at the temporal limbus extending approximately 2 mm through clear cornea into the anterior chamber. A capsulorrhexis-type capsulotomy was performed. The lens nucleus was hydrodissected with balanced salt solution (BSS) and was phacoemulsified. The lens cortical material was aspirated using the automated irrigation/aspiration unit (I/A). Additional viscoelastic was instilled into the anterior segment. An intraocular lens was introduced and centered within the capsular bag.   The viscoelastic was removed and replaced with BSS using the I/A. The tension of the globe was adjusted with BSS. The incisions were watertight without suture closure. The lid speculum was removed. Post-operative drops were placed on the eye. The patient tolerated the procedure well, was awakened from anesthesia, and was taken from the operating room in good condition. Idania Castelan M.D. Operative Note      Patient: Miriam Lemus  YOB: 1960  MRN: [id number]    Date of Procedure: 10/13/2020    Pre-Op Diagnosis: LEFT EYE CATARACT    Post-Op Diagnosis: Same       Procedure(s):  EXTRACAPSULAR CATARACT REMOVAL WITH INSERTION OF INTRAOCULAR LENS PROTHESIS LEFT    Surgeon(s):  Idania Castelan MD    Assistant:   * No surgical staff found *    Anesthesia: General    Estimated Blood Loss (mL): Minimal    Complications: None    Specimens:   * No specimens in log *    Implants:  Implant Name Type Inv.  Item Serial No.  Lot No. LRB No. Used Action   LENS IOL Keyur Seis 1266 1P TECNIS ZCB00 - [id number] Eye LENS IOL Keyur Seis 1266 1PC [facility] [id number]  Left 1 Implanted         Drains: * No LDAs found *    Findings: none    Detailed Description of Procedure:   See procedure note    Electronically signed by Idania Castelan MD on 10/13/2020 at 8:00 AM

## 2020-10-13 NOTE — PROGRESS NOTES
4125-  Patient arrived to pacu via cart to bay 1. Spontaneous respirations even and unlabored. Placed on monitor--VSS. Report received from [address] [place] [address] and Surgical RN. 2061-  Assessment completed. Patient is drowsy, but responds to name and follows commands. IV infusing 0.9 in right hand-- no complications. Patient able to see from left eye without difficulty. Patient denies pain/N/V-- will monitor. SCDs applied. 0777-  Dr. Raul Medina at bedside. No new orders. 65-  Respirations even and unlabored. VSS.    4453-  Patient resting. Appears comfortable. 0820-  Respirations even and unlabored. VSS.   0825-  Reassessment completed. Patient meets criteria to be moved to phase II.   0830-  Snack and drink given to patient. Family in room. 4710-  2 Tylenol given. See MAR.   1594-  All belongings brought to room. 1539-  Patient dressing. 2353-  Discharge instructions given. Understanding verbalized. 4108-  Patient discharged in stable condition with all belongings.

## 2020-10-13 NOTE — ANESTHESIA POST-OP
[facility]  Sedation/Analgesia Post Sedation Record    Pt Name: Naveed De Oliveira  MRN: [id number]  YOB: 1960  Procedure Performed By: Claudia Canela  Primary Care Physician: Mick Cheung DO    POST-PROCEDURE    Physicians/Assistants: Claudia Canela  Procedure Performed: Phacoemulsification cataract extraction with intraocular lens, left eye   Sedation/Anesthesia: General   Estimated Blood Loss:     0  ml  Specimens Removed:  none     Disposition of Specimen: none     Complications: none     Post-procedure Diagnosis/Findings:      1. Combined cataract, left eye         Recommendations:    1.  Home when stable           Claudia Canela  Electronically signed 10/13/2020 at 7:58 AM

## 2021-05-12 LAB
CHOLESTEROL, TOTAL: 245 MG/DL
CHOLESTEROL/HDL RATIO: NORMAL
HDLC SERPL-MCNC: 59 MG/DL (ref 35–70)
LDL CHOLESTEROL CALCULATED: 147 MG/DL (ref 0–160)
NONHDLC SERPL-MCNC: NORMAL MG/DL
TRIGL SERPL-MCNC: 217 MG/DL
VLDLC SERPL CALC-MCNC: 39 MG/DL

## 2021-05-13 ENCOUNTER — TELEPHONE (OUTPATIENT)
Dept: CARDIOLOGY CLINIC | Age: 61
End: 2021-05-13

## 2021-05-13 DIAGNOSIS — E78.5 HYPERLIPIDEMIA, UNSPECIFIED HYPERLIPIDEMIA TYPE: Primary | ICD-10-CM

## 2021-05-13 LAB
AVERAGE GLUCOSE: NORMAL
HBA1C MFR BLD: 7.1 %

## 2021-05-17 RX ORDER — CHLORAL HYDRATE 500 MG
2000 CAPSULE ORAL DAILY
COMMUNITY

## 2021-05-20 ENCOUNTER — HOSPITAL ENCOUNTER (EMERGENCY)
Age: 61
Discharge: HOME | End: 2021-05-20
Attending: NURSE PRACTITIONER
Payer: COMMERCIAL

## 2021-05-20 VITALS
SYSTOLIC BLOOD PRESSURE: 116 MMHG | WEIGHT: 192 LBS | OXYGEN SATURATION: 97 % | HEART RATE: 88 BPM | BODY MASS INDEX: 34.02 KG/M2 | HEIGHT: 63 IN | RESPIRATION RATE: 16 BRPM | TEMPERATURE: 97.8 F | DIASTOLIC BLOOD PRESSURE: 57 MMHG

## 2021-05-20 DIAGNOSIS — L30.9 DERMATITIS: Primary | ICD-10-CM

## 2021-05-20 PROCEDURE — 99213 OFFICE O/P EST LOW 20 MIN: CPT

## 2021-05-20 PROCEDURE — 96372 THER/PROPH/DIAG INJ SC/IM: CPT

## 2021-05-20 PROCEDURE — 6360000002 HC RX W HCPCS: Performed by: NURSE PRACTITIONER

## 2021-05-20 PROCEDURE — 99213 OFFICE O/P EST LOW 20 MIN: CPT | Performed by: NURSE PRACTITIONER

## 2021-05-20 RX ORDER — METHYLPREDNISOLONE ACETATE 40 MG/ML
40 INJECTION, SUSPENSION INTRA-ARTICULAR; INTRALESIONAL; INTRAMUSCULAR; SOFT TISSUE ONCE
Status: COMPLETED | OUTPATIENT
Start: 2021-05-20 | End: 2021-05-20

## 2021-05-20 RX ADMIN — METHYLPREDNISOLONE ACETATE 40 MG: 40 INJECTION, SUSPENSION INTRA-ARTICULAR; INTRALESIONAL; INTRAMUSCULAR; SOFT TISSUE at 08:59

## 2021-05-20 ASSESSMENT — PAIN DESCRIPTION - PAIN TYPE: TYPE: ACUTE PAIN

## 2021-05-20 ASSESSMENT — ENCOUNTER SYMPTOMS
CHEST TIGHTNESS: 0
VOMITING: 0
SORE THROAT: 0
SHORTNESS OF BREATH: 0
NAUSEA: 0
RHINORRHEA: 0
COUGH: 0
DIARRHEA: 0

## 2021-05-20 ASSESSMENT — PAIN DESCRIPTION - DESCRIPTORS: DESCRIPTORS: DISCOMFORT;ITCHING

## 2021-05-20 NOTE — ED PROVIDER NOTES
[facility]  Urgent Care Encounter       CHIEF COMPLAINT       Chief Complaint   Patient presents with    Rash       Nurses Notes reviewed and I agree except as noted in the HPI. HISTORY OF PRESENT ILLNESS   Saeed Clay is a 63 y.o. female who presents to the [facility] urgent care for evaluation of rash. Patient has noted rash to the distal shin circumferential.  She reports noting this rash following wearing stockings while working a new job. She denies new detergents or environmental exposures. She does report that she normally has sensitive skin, and has had issues like this in the past.  She does report that she was newly diagnosed with diabetes. She reports that she starts Metformin today. She reports that her hemoglobin A1c was 7.1 at her last visit. We did discuss the fact that steroids would cause elevated blood sugars. The history is provided by the patient. No  was used. Rash  Location:  Leg  Leg rash location:  R lower leg and L lower leg  Severity:  Mild  Duration:  1 day  Timing:  Constant  Progression:  Unchanged  Chronicity:  New  Relieved by:  Nothing  Worsened by:  Nothing  Associated symptoms: no diarrhea, no fatigue, no fever, no headaches, no nausea, no shortness of breath, no sore throat and not vomiting        REVIEW OF SYSTEMS     Review of Systems   Constitutional: Negative for activity change, appetite change, chills, fatigue and fever. HENT: Negative for ear discharge, ear pain, rhinorrhea and sore throat. Respiratory: Negative for cough, chest tightness and shortness of breath. Cardiovascular: Negative for chest pain. Gastrointestinal: Negative for diarrhea, nausea and vomiting. Genitourinary: Negative for dysuria. Skin: Positive for rash. Allergic/Immunologic: Negative for environmental allergies and food allergies. Neurological: Negative for dizziness and headaches.        PAST MEDICAL HISTORY Diagnosis Date    Hyperlipidemia     Hypertension        SURGICALHISTORY     Patient  has a past surgical history that includes  section; Hysterectomy; Cholecystectomy; joint replacement (Bilateral);  section; and Intracapsular cataract extraction (Left, 10/13/2020). CURRENT MEDICATIONS       Previous Medications    ALIROCUMAB (PRALUENT) 75 MG/ML SOAJ INJECTION PEN    Inject 1 mL into the skin every 14 days    CYCLOBENZAPRINE (FLEXERIL) 10 MG TABLET    Take 10 mg by mouth 3 times daily as needed for Muscle spasms    ESTROGENS, CONJUGATED, (PREMARIN) 0.3 MG TABLET    Take 0.3 mg by mouth 2x week    EZETIMIBE (ZETIA) 10 MG TABLET    Take 1 tablet by mouth daily    LOSARTAN (COZAAR) 100 MG TABLET    Take 100 mg by mouth daily    MONTELUKAST (SINGULAIR) 10 MG TABLET    Take 10 mg by mouth nightly    OMEGA-3 FATTY ACIDS (FISH OIL) 1000 MG CAPS    Take 2,000 mg by mouth daily       ALLERGIES     Patient is is allergic to valium [diazepam]. Patients   There is no immunization history on file for this patient. FAMILY HISTORY     Patient's family history includes Heart Disease in her father; High Blood Pressure in her father. SOCIAL HISTORY     Patient  reports that she has never smoked. She has never used smokeless tobacco. She reports that she does not drink alcohol and does not use drugs. PHYSICAL EXAM     ED TRIAGE VITALS  BP: (!) 148/60, Temp: 97.8 °F (36.6 °C), Pulse: 88, Resp: 16, SpO2: 98 %,Estimated body mass index is 34.01 kg/m² as calculated from the following:    Height as of this encounter: 5' 3\" (1.6 m). Weight as of this encounter: 192 lb (87.1 kg). ,No LMP recorded. Patient has had a hysterectomy. Physical Exam  Vitals and nursing note reviewed. Constitutional:       General: She is not in acute distress. Appearance: Normal appearance. She is not ill-appearing, toxic-appearing or diaphoretic. HENT:      Head: Normocephalic.       Right Ear: Ear canal and external ear normal.      Left Ear: Ear canal and external ear normal.      Nose: Nose normal. No congestion or rhinorrhea. Mouth/Throat:      Mouth: Mucous membranes are moist.      Pharynx: Oropharynx is clear. No oropharyngeal exudate or posterior oropharyngeal erythema. Cardiovascular:      Rate and Rhythm: Normal rate. Pulses: Normal pulses. Pulmonary:      Effort: Pulmonary effort is normal. No respiratory distress. Breath sounds: No stridor. No wheezing or rhonchi. Abdominal:      General: Abdomen is flat. Bowel sounds are normal.      Palpations: Abdomen is soft. Musculoskeletal:         General: No swelling or tenderness. Normal range of motion. Cervical back: Normal range of motion. Skin:     Findings: Erythema and rash present. Neurological:      General: No focal deficit present. Mental Status: She is alert and oriented to person, place, and time. Psychiatric:         Mood and Affect: Mood normal.         Behavior: Behavior normal.         DIAGNOSTIC RESULTS     Labs:No results found for this visit on 05/20/21. IMAGING:    No orders to display         EKG: None      URGENT CARE COURSE:     Vitals:    05/20/21 0835   BP: (!) 148/60   Pulse: 88   Resp: 16   Temp: 97.8 °F (36.6 °C)   SpO2: 98%   Weight: 192 lb (87.1 kg)   Height: 5' 3\" (1.6 m)       Medications   methylPREDNISolone acetate (DEPO-MEDROL) injection 40 mg (has no administration in time range)            PROCEDURES:  None    FINAL IMPRESSION      1. Dermatitis          DISPOSITION/ PLAN     Patient's above symptoms represent contact dermatitis likely related to stockings she had used. She was provided with a Deprol Medrol injection while here. She is instructed to use over-the-counter Benadryl cream for itching. She is instructed to not wear tall socks over this for the next several days.   She does instructed to follow-up with PCP in 3 to 5 days if symptoms persist.  Patient also instructed to watch for symptoms of hyperglycemia. Patient is agreeable to the above plan and denies questions or concerns at this time.       PATIENT REFERRED TO:  DO Gerda Mckeon [facility] / Massiel Gomez [id number]      DISCHARGE MEDICATIONS:  New Prescriptions    No medications on file       Discontinued Medications    HYDROCHLOROTHIAZIDE (MICROZIDE) 12.5 MG CAPSULE    Take 12.5 mg by mouth daily       Current Discharge Medication List          NINA Crowe CNP    (Please note that portions of this note were completed with a voice recognition program. Efforts were made to edit the dictations but occasionally words are mis-transcribed.)           NINA Crowe CNP  05/20/21 0076

## 2021-05-20 NOTE — ED NOTES
Pt complains of having rash on bilat lower legs that started on yesterday. Legs red pt states they are painful and itching.      Ree Hurt RN  05/20/21 8028

## 2021-05-28 ENCOUNTER — HOSPITAL ENCOUNTER (EMERGENCY)
Age: 61
Discharge: HOME | End: 2021-05-28
Attending: NURSE PRACTITIONER
Payer: COMMERCIAL

## 2021-05-28 VITALS
HEART RATE: 76 BPM | SYSTOLIC BLOOD PRESSURE: 123 MMHG | TEMPERATURE: 98.3 F | RESPIRATION RATE: 16 BRPM | OXYGEN SATURATION: 98 % | DIASTOLIC BLOOD PRESSURE: 77 MMHG

## 2021-05-28 DIAGNOSIS — L23.89 ALLERGIC CONTACT DERMATITIS DUE TO OTHER AGENTS: Primary | ICD-10-CM

## 2021-05-28 PROCEDURE — 99213 OFFICE O/P EST LOW 20 MIN: CPT

## 2021-05-28 PROCEDURE — 99213 OFFICE O/P EST LOW 20 MIN: CPT | Performed by: NURSE PRACTITIONER

## 2021-05-28 RX ORDER — BETAMETHASONE VALERATE 0.1 %
LOTION (ML) TOPICAL
Qty: 60 ML | Refills: 0 | Status: SHIPPED | OUTPATIENT
Start: 2021-05-28

## 2021-05-28 RX ORDER — HYDROXYZINE 50 MG/1
50 TABLET, FILM COATED ORAL EVERY 8 HOURS PRN
Qty: 30 TABLET | Refills: 0 | Status: SHIPPED | OUTPATIENT
Start: 2021-05-28 | End: 2021-06-07

## 2021-05-28 RX ORDER — PREDNISONE 20 MG/1
20 TABLET ORAL 2 TIMES DAILY
Qty: 10 TABLET | Refills: 0 | Status: SHIPPED | OUTPATIENT
Start: 2021-05-28 | End: 2021-06-02

## 2021-05-28 RX ORDER — BETAMETHASONE VALERATE 0.1 %
LOTION (ML) TOPICAL
Qty: 60 ML | Refills: 0 | Status: SHIPPED | OUTPATIENT
Start: 2021-05-28 | End: 2021-05-28 | Stop reason: SDUPTHER

## 2021-05-28 RX ORDER — HYDROXYZINE 50 MG/1
50 TABLET, FILM COATED ORAL EVERY 8 HOURS PRN
Qty: 30 TABLET | Refills: 0 | Status: SHIPPED | OUTPATIENT
Start: 2021-05-28 | End: 2021-05-28 | Stop reason: SDUPTHER

## 2021-05-28 RX ORDER — PREDNISONE 20 MG/1
20 TABLET ORAL 2 TIMES DAILY
Qty: 10 TABLET | Refills: 0 | Status: SHIPPED | OUTPATIENT
Start: 2021-05-28 | End: 2021-05-28 | Stop reason: SDUPTHER

## 2021-05-28 ASSESSMENT — ENCOUNTER SYMPTOMS
COUGH: 0
CHEST TIGHTNESS: 0
VOMITING: 0
RHINORRHEA: 0
DIARRHEA: 0
SORE THROAT: 0
NAUSEA: 0
SHORTNESS OF BREATH: 0

## 2021-05-28 NOTE — ED PROVIDER NOTES
[facility]  Urgent Care Encounter       CHIEF COMPLAINT       Chief Complaint   Patient presents with    Rash     legs       Nurses Notes reviewed and I agree except as noted in the HPI. HISTORY OF PRESENT ILLNESS   Harry Valdez is a 57 y.o. female who presents to the [facility] urgent care for evaluation of rash. Patient was seen here 8 days ago for similar rash. She was given a shot of Depo-Medrol along with using over-the-counter steroid creams. She reports that the rash had improved while she was off work, but worsened after working 1-2 nights. She reports that the pants that she wears to work irritates this area. She describes the area as very itchy with mild pain described as burning. She rates the pain at 3 of 10. No other symptoms. The history is provided by the patient. No  was used. REVIEW OF SYSTEMS     Review of Systems   Constitutional: Negative for activity change, appetite change, chills, fatigue and fever. HENT: Negative for ear discharge, ear pain, rhinorrhea and sore throat. Respiratory: Negative for cough, chest tightness and shortness of breath. Cardiovascular: Negative for chest pain. Gastrointestinal: Negative for diarrhea, nausea and vomiting. Genitourinary: Negative for dysuria. Skin: Positive for rash. Allergic/Immunologic: Negative for environmental allergies and food allergies. Neurological: Negative for dizziness and headaches. PAST MEDICAL HISTORY         Diagnosis Date    Hyperlipidemia     Hypertension        SURGICALHISTORY     Patient  has a past surgical history that includes  section; Hysterectomy; Cholecystectomy; joint replacement (Bilateral);  section; and Intracapsular cataract extraction (Left, 10/13/2020).     CURRENT MEDICATIONS       Discharge Medication List as of 2021  7:39 PM      CONTINUE these medications which have NOT CHANGED    Details   metFORMIN (GLUCOPHAGE) 500 MG tablet Historical Med      Omega-3 Fatty Acids (FISH OIL) 1000 MG CAPS Take 2,000 mg by mouth dailyHistorical Med      losartan (COZAAR) 100 MG tablet Take 100 mg by mouth dailyHistorical Med      montelukast (SINGULAIR) 10 MG tablet Take 10 mg by mouth nightlyHistorical Med      ezetimibe (ZETIA) 10 MG tablet Take 1 tablet by mouth daily, Disp-90 tablet,R-3Normal      alirocumab (PRALUENT) 75 MG/ML SOAJ injection pen Inject 1 mL into the skin every 14 days, Disp-2 pen,R-11Normal      estrogens, conjugated, (PREMARIN) 0.3 MG tablet Take 0.3 mg by mouth 2x weekHistorical Med      cyclobenzaprine (FLEXERIL) 10 MG tablet Take 10 mg by mouth 3 times daily as needed for Muscle spasmsHistorical Med             ALLERGIES     Patient is is allergic to valium [diazepam]. Patients   There is no immunization history on file for this patient. FAMILY HISTORY     Patient's family history includes Heart Disease in her father; High Blood Pressure in her father. SOCIAL HISTORY     Patient  reports that she has never smoked. She has never used smokeless tobacco. She reports that she does not drink alcohol and does not use drugs. PHYSICAL EXAM     ED TRIAGE VITALS  BP: 123/77, Temp: 98.3 °F (36.8 °C), Pulse: 76, Resp: 16, SpO2: 98 %,Estimated body mass index is 34.01 kg/m² as calculated from the following:    Height as of 5/20/21: 5' 3\" (1.6 m). Weight as of 5/20/21: 192 lb (87.1 kg). ,No LMP recorded. Patient has had a hysterectomy. Physical Exam  Vitals and nursing note reviewed. Constitutional:       General: She is not in acute distress. Appearance: Normal appearance. She is not ill-appearing, toxic-appearing or diaphoretic. HENT:      Head: Normocephalic. Right Ear: Ear canal and external ear normal.      Left Ear: Ear canal and external ear normal.      Nose: Nose normal. No congestion or rhinorrhea.       Mouth/Throat:      Mouth: Mucous membranes are moist.      Pharynx: Oropharynx is clear. No oropharyngeal exudate or posterior oropharyngeal erythema. Cardiovascular:      Rate and Rhythm: Normal rate. Pulses: Normal pulses. Pulmonary:      Effort: Pulmonary effort is normal. No respiratory distress. Breath sounds: No stridor. No wheezing or rhonchi. Abdominal:      General: Abdomen is flat. Bowel sounds are normal.      Palpations: Abdomen is soft. Musculoskeletal:         General: No swelling or tenderness. Normal range of motion. Cervical back: Normal range of motion. Skin:     Findings: Rash present. Rash is papular and scaling. Neurological:      General: No focal deficit present. Mental Status: She is alert and oriented to person, place, and time. Psychiatric:         Mood and Affect: Mood normal.         Behavior: Behavior normal.         DIAGNOSTIC RESULTS     Labs:No results found for this visit on 05/28/21. IMAGING:    No orders to display         EKG: None      URGENT CARE COURSE:     Vitals:    05/28/21 1925   BP: 123/77   Pulse: 76   Resp: 16   Temp: 98.3 °F (36.8 °C)   TempSrc: Temporal   SpO2: 98%       Medications - No data to display         PROCEDURES:  None    FINAL IMPRESSION      1. Allergic contact dermatitis due to other agents          DISPOSITION/ PLAN     Patient seen and evaluated for rash. Patient had reported rash from last week had improved mildly, but worsened again. She was prescribed a 5-day course of prednisone. She is also prescribed Valisone topically for irritation. She is also prescribed Atarax for itching. She is instructed not to drink drive or operate heavy machinery following Atarax administration. She is also instructed to attempt to wear loose fitting pants. She is instructed to follow-up with PCP or [facility] in 3 to 5 days with new or worsening symptoms.       PATIENT REFERRED TO:  DO Gerda Bermudez [address] [place] [facility] [id number]      DISCHARGE MEDICATIONS:  Discharge Medication List as of 5/28/2021  7:39 PM      START taking these medications    Details   predniSONE (DELTASONE) 20 MG tablet Take 1 tablet by mouth 2 times daily for 5 days, Disp-10 tablet, R-0Normal      betamethasone valerate (VALISONE) 0.1 % lotion Apply topically 2 times daily. , Disp-60 mL, R-0, Normal      hydrOXYzine (ATARAX) 50 MG tablet Take 1 tablet by mouth every 8 hours as needed for Itching, Disp-30 tablet, R-0Normal             Discharge Medication List as of 5/28/2021  7:39 PM          Discharge Medication List as of 5/28/2021  7:39 PM          NINA Aquino CNP    (Please note that portions of this note were completed with a voice recognition program. Efforts were made to edit the dictations but occasionally words are mis-transcribed.)           NINA Aquino CNP  05/28/21 2007

## 2021-06-01 ENCOUNTER — HOSPITAL ENCOUNTER (OUTPATIENT)
Dept: WOMENS IMAGING | Age: 61
Discharge: HOME | End: 2021-06-01
Payer: COMMERCIAL

## 2021-06-01 DIAGNOSIS — Z78.0 MENOPAUSE: ICD-10-CM

## 2021-06-01 DIAGNOSIS — Z78.0 POSTMENOPAUSAL: ICD-10-CM

## 2021-06-01 PROCEDURE — 77080 DXA BONE DENSITY AXIAL: CPT

## 2021-06-08 ENCOUNTER — HOSPITAL ENCOUNTER (EMERGENCY)
Age: 61
Discharge: HOME | End: 2021-06-08
Attending: EMERGENCY MEDICINE
Payer: COMMERCIAL

## 2021-06-08 VITALS
TEMPERATURE: 95.5 F | DIASTOLIC BLOOD PRESSURE: 66 MMHG | OXYGEN SATURATION: 97 % | WEIGHT: 188 LBS | HEART RATE: 80 BPM | SYSTOLIC BLOOD PRESSURE: 130 MMHG | RESPIRATION RATE: 18 BRPM | BODY MASS INDEX: 33.3 KG/M2

## 2021-06-08 DIAGNOSIS — L03.211 FACIAL CELLULITIS: Primary | ICD-10-CM

## 2021-06-08 DIAGNOSIS — E11.69 DIABETES MELLITUS TYPE 2 IN OBESE (HCC): ICD-10-CM

## 2021-06-08 DIAGNOSIS — E66.9 DIABETES MELLITUS TYPE 2 IN OBESE (HCC): ICD-10-CM

## 2021-06-08 PROCEDURE — 99213 OFFICE O/P EST LOW 20 MIN: CPT | Performed by: EMERGENCY MEDICINE

## 2021-06-08 PROCEDURE — 99213 OFFICE O/P EST LOW 20 MIN: CPT

## 2021-06-08 RX ORDER — CLINDAMYCIN HYDROCHLORIDE 150 MG/1
150 CAPSULE ORAL 3 TIMES DAILY
Qty: 21 CAPSULE | Refills: 0 | Status: SHIPPED | OUTPATIENT
Start: 2021-06-08 | End: 2021-12-20 | Stop reason: SDUPTHER

## 2021-06-08 RX ORDER — LORATADINE 10 MG/1
10 TABLET ORAL DAILY
COMMUNITY

## 2021-06-08 ASSESSMENT — ENCOUNTER SYMPTOMS
STRIDOR: 0
TROUBLE SWALLOWING: 0
SORE THROAT: 0
VOMITING: 0
CONSTIPATION: 0
SINUS PRESSURE: 0
FACIAL SWELLING: 0
COLOR CHANGE: 1
EYE PAIN: 0
BLOOD IN STOOL: 0
BACK PAIN: 0
ABDOMINAL PAIN: 0
VOICE CHANGE: 0
DIARRHEA: 0
SHORTNESS OF BREATH: 0
EYE DISCHARGE: 0
EYE REDNESS: 0
WHEEZING: 0
NAUSEA: 0
COUGH: 0
CHOKING: 0

## 2021-06-08 ASSESSMENT — PAIN DESCRIPTION - LOCATION: LOCATION: NOSE

## 2021-06-08 ASSESSMENT — PAIN SCALES - GENERAL: PAINLEVEL_OUTOF10: 4

## 2021-06-08 NOTE — ED TRIAGE NOTES
Pt walked to room 7. Pt here with complaints of facial pressure on left side and nose sore and swollen. Nose is painful to touch. Pain started in nose a week ago.

## 2021-06-08 NOTE — ED PROVIDER NOTES
[address]       Chief Complaint   Patient presents with    Facial Pain     Nose swollen and painful and sinus pressure on left side. Pain in nose started a week ago. Nurses Notes reviewed and I agree except as noted in the HPI. HISTORY OF PRESENT ILLNESS   Antonieta Wise is a 57 y.o. female who presents with 1 week history of redness, tenderness, pain and swelling of the nose and maxillary face. She rates pain at 4/10 severity. No nasal trauma or injury. No chest pain, shortness of breath, dizziness, fever, vomiting, sore throat, headache, neck pain or neck stiffness, painful or swollen glands. Recently started Metformin for diabetes. Blood sugars have been below 120. Non-smoker. No history of MRSA    REVIEW OF SYSTEMS     Review of Systems   Constitutional: Negative for appetite change, chills, fatigue, fever and unexpected weight change. HENT: Negative for congestion, ear discharge, ear pain, facial swelling, hearing loss, nosebleeds, postnasal drip, sinus pressure, sore throat, trouble swallowing and voice change. Eyes: Negative for pain, discharge, redness and visual disturbance. Respiratory: Negative for cough, choking, shortness of breath, wheezing and stridor. Cardiovascular: Negative for chest pain and leg swelling. Gastrointestinal: Negative for abdominal pain, blood in stool, constipation, diarrhea, nausea and vomiting. Genitourinary: Negative for dysuria, flank pain, frequency, hematuria, urgency, vaginal bleeding and vaginal discharge. Musculoskeletal: Negative for arthralgias, back pain, neck pain and neck stiffness. Skin: Positive for color change. Negative for rash. Nasal pain, tenderness, redness and swelling   Neurological: Negative for dizziness, seizures, syncope, weakness, light-headedness and headaches. Hematological: Negative for adenopathy. Does not bruise/bleed easily. Psychiatric/Behavioral: Negative for confusion, sleep disturbance and suicidal ideas. The patient is not nervous/anxious. All other systems reviewed and are negative. PAST MEDICAL HISTORY         Diagnosis Date    Hyperlipidemia     Hypertension        SURGICAL HISTORY     Patient  has a past surgical history that includes  section; Hysterectomy; Cholecystectomy; joint replacement (Bilateral);  section; and Intracapsular cataract extraction (Left, 10/13/2020). CURRENT MEDICATIONS       Discharge Medication List as of 2021 10:39 AM      CONTINUE these medications which have NOT CHANGED    Details   loratadine (CLARITIN) 10 MG tablet Take 10 mg by mouth dailyHistorical Med      metFORMIN (GLUCOPHAGE) 500 MG tablet Historical Med      betamethasone valerate (VALISONE) 0.1 % lotion Apply topically 2 times daily. , Disp-60 mL, R-0, Normal      Omega-3 Fatty Acids (FISH OIL) 1000 MG CAPS Take 2,000 mg by mouth dailyHistorical Med      losartan (COZAAR) 100 MG tablet Take 100 mg by mouth dailyHistorical Med      montelukast (SINGULAIR) 10 MG tablet Take 10 mg by mouth nightlyHistorical Med      ezetimibe (ZETIA) 10 MG tablet Take 1 tablet by mouth daily, Disp-90 tablet,R-3Normal      alirocumab (PRALUENT) 75 MG/ML SOAJ injection pen Inject 1 mL into the skin every 14 days, Disp-2 pen,R-11Normal      estrogens, conjugated, (PREMARIN) 0.3 MG tablet Take 0.3 mg by mouth 2x weekHistorical Med      cyclobenzaprine (FLEXERIL) 10 MG tablet Take 10 mg by mouth 3 times daily as needed for Muscle spasmsHistorical Med             ALLERGIES     Patient is is allergic to valium [diazepam]. FAMILY HISTORY     Patient'sfamily history includes Heart Disease in her father; High Blood Pressure in her father. SOCIAL HISTORY     Patient  reports that she has never smoked. She has never used smokeless tobacco. She reports that she does not drink alcohol and does not use drugs.     PHYSICAL EXAM     ED TRIAGE VITALS  BP: 130/66, Temp: 95.5 °F (35.3 °C), Pulse: 80, Resp: 18, SpO2: 97 %  Physical Exam  Vitals and nursing note reviewed. Constitutional:       General: She is not in acute distress. Appearance: She is well-developed. She is not ill-appearing. Comments: Moist membranes, normal airway, no stridor   HENT:      Head: Normocephalic and atraumatic. Right Ear: Tympanic membrane and external ear normal.      Left Ear: Tympanic membrane and external ear normal.      Nose: No congestion or rhinorrhea. Right Sinus: Maxillary sinus tenderness present. No frontal sinus tenderness. Left Sinus: Maxillary sinus tenderness present. No frontal sinus tenderness. Comments: Warmth, erythema swelling and tenderness of the nose. No abscess. Mouth/Throat:      Pharynx: No oropharyngeal exudate or posterior oropharyngeal erythema. Comments: Oropharynx normal  Eyes:      General: No scleral icterus. Right eye: No discharge. Left eye: No discharge. Extraocular Movements:      Right eye: Normal extraocular motion. Left eye: Normal extraocular motion. Conjunctiva/sclera: Conjunctivae normal.      Pupils: Pupils are equal, round, and reactive to light. Comments: Conjunctiva clear   Neck:      Thyroid: No thyromegaly. Vascular: No JVD. Comments: No meningismus  Cardiovascular:      Rate and Rhythm: Normal rate and regular rhythm. Pulses: Normal pulses. Heart sounds: Normal heart sounds, S1 normal and S2 normal. No murmur heard. No friction rub. No gallop. Pulmonary:      Effort: Pulmonary effort is normal. No tachypnea or respiratory distress. Breath sounds: Normal breath sounds. No stridor. No decreased breath sounds, wheezing, rhonchi or rales. Comments: No cough, lungs clear  Chest:      Chest wall: No tenderness. Abdominal:      General: Bowel sounds are normal. There is no distension. Palpations: Abdomen is soft. There is no mass. Tenderness: There is no abdominal tenderness. There is no right CVA tenderness, left CVA tenderness, guarding or rebound. Comments: Soft nontender   Musculoskeletal:         General: No tenderness. Normal range of motion. Cervical back: Normal range of motion. Right lower leg: Normal.      Left lower leg: Normal.   Lymphadenopathy:      Cervical: Cervical adenopathy present. Right cervical: Superficial cervical adenopathy present. No deep cervical adenopathy. Left cervical: Superficial cervical adenopathy present. No deep cervical adenopathy. Skin:     General: Skin is warm and dry. Findings: No erythema or rash. Comments: Erythema swelling tenderness of the nose and maxillary face. Neurological:      Mental Status: She is alert and oriented to person, place, and time. Cranial Nerves: No cranial nerve deficit. Motor: No abnormal muscle tone. Coordination: Coordination normal.      Deep Tendon Reflexes: Reflexes are normal and symmetric. Reflexes normal.      Comments: Appropriate, no focal finding   Psychiatric:         Behavior: Behavior normal.         Thought Content: Thought content normal.         Judgment: Judgment normal.         DIAGNOSTIC RESULTS   Labs: No results found for this visit on 06/08/21. IMAGING:  No orders to display     URGENT CARE COURSE:     Vitals:    06/08/21 0957   BP: 130/66   Pulse: 80   Resp: 18   Temp: 95.5 °F (35.3 °C)   TempSrc: Temporal   SpO2: 97%   Weight: 188 lb (85.3 kg)       Medications - No data to display  PROCEDURES:  None  FINALIMPRESSION      1. Facial cellulitis    2. Diabetes mellitus type 2 in obese [facility])        DISPOSITION/PLAN   DISPOSITION Decision To Discharge 06/08/2021 10:31:44 AM  Nontoxic, well-hydrated, normal airway. No airway abscess or epiglottitis, sepsis, CNS infection, pneumonia, hypoxia, bronchospasm. Patient has facial cellulitis. No abscess.   Will treat with clindamycin, Bactroban ointment, Tylenol, increased oral clear liquids, rest.  Patient to follow-up with family medicine practice for reevaluation in 2 days, and she understands to go to ED if worse. PATIENT REFERRED TO:  [address] [place] [address],[address] [facility]. [address] [facility]  Schedule an appointment as soon as possible for a visit in 2 days  Recheck in office, use referral number, go to emergency if worse    DISCHARGE MEDICATIONS:  Discharge Medication List as of 6/8/2021 10:39 AM      START taking these medications    Details   clindamycin (CLEOCIN) 150 MG capsule Take 1 capsule by mouth 3 times daily for 7 days, Disp-21 capsule, R-0Print      mupirocin (BACTROBAN) 2 % ointment Apply topically 3 times daily. , Disp-22 g, R-0, Print           Discharge Medication List as of 6/8/2021 10:39 AM          MD Niki Diehl MD  06/08/21 1100

## 2021-06-08 NOTE — ED NOTES
Pt discharged. Pt verbalized understanding of discharge instructions and scripts. Pt walked out per self in stable condition.      David Davis LPN  37/85/39 0407

## 2021-06-29 RX ORDER — ALIROCUMAB 75 MG/ML
75 INJECTION, SOLUTION SUBCUTANEOUS
Qty: 2 PEN | Refills: 11 | Status: SHIPPED | OUTPATIENT
Start: 2021-06-29 | End: 2021-07-06

## 2021-07-06 RX ORDER — ALIROCUMAB 75 MG/ML
75 INJECTION, SOLUTION SUBCUTANEOUS
Qty: 2 PEN | Refills: 11 | Status: SHIPPED | OUTPATIENT
Start: 2021-07-06 | End: 2021-10-11 | Stop reason: SDUPTHER

## 2021-08-03 ENCOUNTER — TELEPHONE (OUTPATIENT)
Dept: CARDIOLOGY CLINIC | Age: 61
End: 2021-08-03

## 2021-08-03 NOTE — PROGRESS NOTES
Chief Complaint   Patient presents with   Elda Calhoun Doctor     former pt Dr. Abbi Mosley Diabetes    Hypertension    Hyperlipidemia    Menopause    Other     Skin lesion    Other     Cyst    Jaw Pain    Leg Swelling       History obtained from the patient. SUBJECTIVE:  Josesito Nogueira is a 57 y.o. female that presents today for establishing care with new physician, etc. New patient, 1[facility] time visit to [facility] [address]. -DM2: Dx end of May 2021  startd on metformin 500mg bid  a1c was 7.1, down to 6.3      -HTN:    HPI:    Taking meds as prescribed ?: yes  Tolerating well ?: yes  Side Effects ?: denies  BP at home ?: <140/90  Working on TLCS ?: yes  Chest Pain/SOB/Palpitations? denies    BP Readings from Last 3 Encounters:   08/05/21 122/64   06/08/21 130/66   05/28/21 123/77       -HLD:    HPI:  Intolerant of statins  On zetia and  Praluent    Taking meds as prescribed ?: yes  Tolerating well ?: yes  Side Effects ?: denies  Muscle Pain?: denies  Working on TLCS ?: yes      -Surgical menopause: On premarin  Unable to come off completely d/t hot flashes  On lowest dose      -Skin lesion:  On R arm   Been there a while but changing over time some      -Lesion L cheek:  Been there a long time  But got a little bigger a few months ago  No change since  No pain      -Jaw pain:  On and off jaw pain for years  Worse the last few months  Comes and goes  Clenching jaw more      -Leg swelling/rash:  Chronic issue with leg swelling  Worse when on feet for a long time  When legs swelling, gets a rash  Has compression stockings, but not using.    Mild swelling/rash today  No CP/SOB, orthopnea or PND        Age/Gender Health Maintenance    Lipid -   Lab Results   Component Value Date    CHOL 245 05/12/2021    CHOL 239 09/22/2020    CHOL 283 (H) 08/30/2018     Lab Results   Component Value Date    TRIG 217 05/12/2021    TRIG 317 09/22/2020    TRIG 284 (H) 08/30/2018     Lab Results   Component Value Date    HDL 59 05/12/2021    HDL 58 09/22/2020    HDL 63 08/30/2018     Lab Results   Component Value Date    LDLCALC 147 05/12/2021    LDLCALC 125 09/22/2020    LDLCALC 163 (H) 08/30/2018     Lab Results   Component Value Date    VLDL 39 05/12/2021    VLDL 56 09/22/2020    VLDL 57 (H) 08/30/2018     No results found for: CHOLHDLRATIO    Colon Cancer Screening - records pending,   Lung Cancer Screening (Age 54 to [de-identified] with 30 pack year hx, current smoker or quit within past 15 years) - never soker    Tetanus - UTD AUG 2021  Influenza Vaccine - Candidate FALL 2021  Pneumonia Vaccine - UTD AUG 2021  Zoster - 1 of 2 completed JAN 2021     Breast Cancer Screening - NEG AUG 2020  Cervical Cancer Screening - hyst for benign reasons. Osteoporosis Screening - neg June 2021      Diabetes Health Maintenance    A1C -   Lab Results   Component Value Date    LABA1C 6.3 08/05/2021       ACE/ARB - yes, cozaar  Eye - records pending  Foot - UTD AUG 2021  ASA - no  Microal/Cr - No results found for: LABMICR, LABCREA, MACRR  No results found for: CREATINEUR, MICROALBUR, MALBCR    eGFR - No results found for: GFRESTIMATE  No results found for: LABGLOM  No results found for: CRCLEARANCE  No results found for: EGFRNONAA  No results found for: EGFRAA      Statin - no, intolerant      Current Outpatient Medications   Medication Sig Dispense Refill    diphenhydrAMINE (BENADRYL) 25 MG tablet Take 25 mg by mouth every 6 hours as needed for Itching      estrogens, conjugated, (PREMARIN) 0.3 MG tablet Take 0.3 mg by mouth daily      PRALUENT 75 MG/ML SOAJ injection pen INJECT 1 ML INTO THE SKIN EVERY 14 DAYS 2 pen 11    loratadine (CLARITIN) 10 MG tablet Take 10 mg by mouth daily      metFORMIN (GLUCOPHAGE) 500 MG tablet Take 500 mg by mouth 2 times daily (with meals)       betamethasone valerate (VALISONE) 0.1 % lotion Apply topically 2 times daily.  60 mL 0    Omega-3 Fatty Acids (FISH OIL) 1000 MG CAPS Take 2,000 mg by mouth daily      montelukast (SINGULAIR) 10 MG tablet Take 10 mg by mouth nightly      ezetimibe (ZETIA) 10 MG tablet Take 1 tablet by mouth daily 90 tablet 3    cyclobenzaprine (FLEXERIL) 10 MG tablet Take 10 mg by mouth 3 times daily as needed for Muscle spasms      losartan-hydroCHLOROthiazide (HYZAAR) 100-12.5 MG per tablet TAKE 1 TABLET BY MOUTH EVERY DAY       No current facility-administered medications for this visit. No orders of the defined types were placed in this encounter. All medications reviewed and reconciled, including OTC and herbal medications. Updated list given to patient. Patient Active Problem List    Diagnosis Date Noted    AK (actinic keratosis) R forearm 2021    Epidermal cyst of face 2021    Bilateral temporomandibular joint pain 2021    Chronic venous insufficiency of lower extremity 2021    Venous stasis dermatitis of both lower extremities 2021    DM2 [facility]diabetes mellitus, type 2) ([facility] [address]     Hypertension, essential     Surgical menopause     Statin intolerance      LFTS      Dyslipidemia        Past Medical History:   Diagnosis Date    DM2 (diabetes mellitus, type 2) ([facility] [address]     Dyslipidemia     Hypertension, essential     Statin intolerance     LFTS    Surgical menopause        Past Surgical History:   Procedure Laterality Date     SECTION       x 3     SECTION      x3    CHOLECYSTECTOMY      INTRACAPSULAR CATARACT EXTRACTION Left 10/13/2020    EXTRACAPSULAR CATARACT REMOVAL WITH INSERTION OF INTRAOCULAR LENS PROTHESIS LEFT performed by Gisele Sanchez MD at [address] Bilateral     knees    JEANINE AND BSO  1986    Endometriosis.        Allergies   Allergen Reactions    Statins      Liver issues    Valium [Diazepam] Other (See Comments)     Patient gets very irritated       Social History     Tobacco Use    Smoking status: Never Smoker    Smokeless tobacco: Never Used Substance Use Topics    Alcohol use: No       Family History   Problem Relation Age of Onset    Diabetes Mother     High Blood Pressure Father     Heart Disease Father          I have reviewed the patient's past medical history, past surgical history, allergies, medications, social and family history and I have made updates where appropriate. Review of Systems  Positive responses are highlighted in bold    Constitutional:  Fever, Chills, Night Sweats, Fatigue, Unexpected changes in weight  Eyes:  Eye discharge, Eye pain, Eye redness, Visual disturbances   HENT:  Ear pain, Tinnitus, Nosebleeds, Trouble swallowing, Hearing loss, Sore throat  Cardiovascular:  Chest Pain, Palpitations, Orthopnea, Paroxysmal Nocturnal Dyspnea  Respiratory:  Cough, Wheezing, Shortness of breath, Chest tightness, Apnea  Gastrointestinal:  Nausea, Vomiting, Diarrhea, Constipation, Heartburn, Blood in stool  Genitourinary:  Difficulty or painful urination, Flank pain, Change in frequency, Urgency  Skin:  Color change, Rash, Itching, Wound  Psychiatric:  Hallucinations, Anxiety, Depression, Suicidal ideation  Hematological:  Enlarged glands, Easy bleeding, Easily bruising  Musculoskeletal:  Joint pain, Back pain, Gait problems, Joint swelling, Myalgias  Neurological:  Dizziness, Headaches, Presyncope, Numbness, Seizures, Tremors  Allergy:  Environmental allergies, Food allergies  Endocrine:  Heat Intolerance, Cold Intolerance, Polydipsia, Polyphagia, Polyuria      PHYSICAL EXAM:  Vitals:    08/05/21 1304   BP: 122/64   Pulse: 82   Resp: 12   Temp: 98.4 °F (36.9 °C)   TempSrc: Oral   SpO2: 100%   Weight: 186 lb (84.4 kg)   Height: 5' 2.5\" (1.588 m)     Body mass index is 33.48 kg/m².   Pain Score:   0 - No pain    VS Reviewed  General Appearance: A&O x 3, No acute distress,well developed and well- nourished  Head: normocephalic and atraumatic  Eyes: pupils equal, round, and reactive to light, extraocular eye movements intact, conjunctivae and eye lids without erythema  ENT: external ear and ear canal clear bilaterally, TMs intact and regular, nose without deformity, nasal mucosa and turbinates normal without polyps, oropharynx normal, dentition is normal for age. Mild TTP along both TMJ. Zig-zag opening with click. Small pea sized cyst on mid cheek on L side. Non-tender, firm and mobile. Neck: supple and non-tender without mass, no thyromegaly or thyroid nodules, no cervical lymphadenopathy  Pulmonary/Chest: clear to auscultation bilaterally- no wheezes, rales or rhonchi, normal air movement, no respiratory distress or retractions  Cardiovascular: S1 and S2 auscultated w/ RRR. No murmurs, rubs, clicks, or gallops, distal pulses intact. Abdomen: soft, non-tender, non-distended, bowel sounds physiologic,  no rebound or guarding, no masses or hernias noted. Liver and spleen without enlargement. Extremities: no cyanosis, clubbing of the lower extremities. Trace bilat LE edema. +2 PT/DP bilaterally. Musculoskeletal: No joint swelling or gross deformity   Neuro:  Alert, 5/5 strength globally and symmetrically, 2+ patellar reflexes bilaterally  Psych: Affect appropriate. Mood normal. Thought process is normal without evidence of depression or psychosis. Good insight and appropriate interaction. Cognition and memory appear to be intact. Skin: warm and dry, no rash or erythema other than slight red rash on bilat LE. 0.2 cm x 0.2 cm papule lateral R forearm  Lymph:  No cervical, auricular or supraclavicular lymph nodes palpated  Foot: Bilat 2+DP/PT bilaterally. Skin warm, dry and intact. Sensation normal throughout to touch and with monofilament. Results for POC orders placed in visit on 08/05/21   POCT glycosylated hemoglobin (Hb A1C)   Result Value Ref Range    Hemoglobin A1C 6.3 (H) 4.3 - 5.7 %       ASSESSMENT & PLAN  1.  Type 2 diabetes mellitus without complication, without long-term current use of insulin ([address]    Improved  At goal  con't metformin  Will refer to nutrition  Labs at [facility], will get results. - POCT glycosylated hemoglobin (Hb A1C)  - [facility] - Dietitian  -  DIABETES FOOT EXAM    2. Hypertension, essential    Stable  At goal  con't Hyzaar  Lab results pending    3. Dyslipidemia    Await lab results  Intolerant of statins, causes liver issues per pt  con't zetia and Praluent    4. Statin intolerance      5. Surgical menopause    Stable  con't estrogen  Unable to wean past 0.3mg previously    6. AK (actinic keratosis) R forearm    Cryo x 3  F/u 4 wks if not resolved and will freeze again  If not gone after 2 freezes, bx    AK Cryotherapy Note    Verbal consent was obtain from patient or their guargian prior to procedure. After obtaining informed consent, the patient's identity, procedure, and site were verified during a pause prior to proceeding with the minor surgical procedure as per universal protocol recommendations. Wart was treated with light cryotherapy with freeze thaw freeze technique with 2-3 mm surround freeze of overlying keratin. Local care discussed. Side effects of treatment and precautions discussed. All questions answered. To follow up if worse or any new problems. - [id number] - [place] DESTRUC PREMALIGNANT, FIRST LESION    7. Epidermal cyst of face    Likely benign cyst  Will monitor for now  If any changes, she will call and will setup with ENT    8. Bilateral temporomandibular joint pain    Tylenol  Jaw rest  OTC dental appliance  F/u dental if no better    9. Chronic venous insufficiency of lower extremity    Resume compression to see if this resolves the issues  If it does and/or makes it worse, f/u in office    10. Venous stasis dermatitis of both lower extremities    As per # 9    11. Need for pneumococcal vaccination    - Pneumococcal polysaccharide vaccine 23-valent greater than or equal to 3yo subcutaneous/IM    12.  Need for Tdap vaccination    - Tdap (age 6y and older) IM ([address])      DISPOSITION    Return in about 6 months (around 2/5/2022) for Follow-up Diabetes, follow-up on chronic medical conditions, sooner as needed. Mchugh released without restrictions. Future Appointments   Date Time Provider Tana Gracia   10/11/2021  1:30 PM Mic Stevenson MD [facility]   2/8/2022 10:00 AM Glenys Molina DO [place] [id number] [facility]     PATIENT COUNSELING    Barriers to learning and self management: none    Discussed use, benefit, and side effects of prescribed medications. Barriers to medication compliance addressed. All patient questions answered. Pt voiced understanding.        Electronically signed by Glenys Molina DO on 8/5/2021 at 3:23 PM

## 2021-08-04 ENCOUNTER — TELEPHONE (OUTPATIENT)
Dept: FAMILY MEDICINE CLINIC | Age: 61
End: 2021-08-04

## 2021-08-04 NOTE — TELEPHONE ENCOUNTER
----- Message from Leander Tipton DO sent at 8/4/2021  6:33 AM EDT -----  New pt tomorrow  Needs previsit planning call    Thanks!

## 2021-08-04 NOTE — TELEPHONE ENCOUNTER
1. Appt time and date. (give directions)     2. Arrive 15 min before appt. 3. Please bring all medications to appt. 4. Bring immunization record.   (if no record, which immunizations have you had and where?)        Future Appointments   Date Time Provider Tana Gracia   8/5/2021  1:00 PM Maverick Fischer, [address] [place] [address]   10/11/2021  1:30 PM Ronn Mendes MD [facility]

## 2021-08-04 NOTE — TELEPHONE ENCOUNTER
Praluent approved. Valid 08/03/2021-08/03/2022. Pharmacy aware. [facility] states patient filled medication at [facility]. Patient is aware. Patient filled medication at [facility] because that is where she works now. Patient stated that she got a notification from them that the medication is ready to be picked up.

## 2021-08-05 ENCOUNTER — OFFICE VISIT (OUTPATIENT)
Dept: FAMILY MEDICINE CLINIC | Age: 61
End: 2021-08-05
Payer: COMMERCIAL

## 2021-08-05 VITALS
HEIGHT: 63 IN | WEIGHT: 186 LBS | TEMPERATURE: 98.4 F | BODY MASS INDEX: 32.96 KG/M2 | DIASTOLIC BLOOD PRESSURE: 64 MMHG | HEART RATE: 82 BPM | SYSTOLIC BLOOD PRESSURE: 122 MMHG | RESPIRATION RATE: 12 BRPM | OXYGEN SATURATION: 100 %

## 2021-08-05 DIAGNOSIS — E11.9 TYPE 2 DIABETES MELLITUS WITHOUT COMPLICATION, WITHOUT LONG-TERM CURRENT USE OF INSULIN (HCC): Primary | ICD-10-CM

## 2021-08-05 DIAGNOSIS — Z23 NEED FOR PNEUMOCOCCAL VACCINATION: ICD-10-CM

## 2021-08-05 DIAGNOSIS — I87.2 CHRONIC VENOUS INSUFFICIENCY OF LOWER EXTREMITY: ICD-10-CM

## 2021-08-05 DIAGNOSIS — L72.0 EPIDERMAL CYST OF FACE: ICD-10-CM

## 2021-08-05 DIAGNOSIS — L57.0 AK (ACTINIC KERATOSIS): ICD-10-CM

## 2021-08-05 DIAGNOSIS — I10 HYPERTENSION, ESSENTIAL: ICD-10-CM

## 2021-08-05 DIAGNOSIS — M26.623 BILATERAL TEMPOROMANDIBULAR JOINT PAIN: ICD-10-CM

## 2021-08-05 DIAGNOSIS — E78.5 DYSLIPIDEMIA: Chronic | ICD-10-CM

## 2021-08-05 DIAGNOSIS — I87.2 VENOUS STASIS DERMATITIS OF BOTH LOWER EXTREMITIES: ICD-10-CM

## 2021-08-05 DIAGNOSIS — Z23 NEED FOR TDAP VACCINATION: ICD-10-CM

## 2021-08-05 DIAGNOSIS — E89.40 SURGICAL MENOPAUSE: Chronic | ICD-10-CM

## 2021-08-05 DIAGNOSIS — Z78.9 STATIN INTOLERANCE: Chronic | ICD-10-CM

## 2021-08-05 LAB — HBA1C MFR BLD: 6.3 % (ref 4.3–5.7)

## 2021-08-05 PROCEDURE — 90472 IMMUNIZATION ADMIN EACH ADD: CPT | Performed by: FAMILY MEDICINE

## 2021-08-05 PROCEDURE — 3044F HG A1C LEVEL LT 7.0%: CPT | Performed by: FAMILY MEDICINE

## 2021-08-05 PROCEDURE — 90471 IMMUNIZATION ADMIN: CPT | Performed by: FAMILY MEDICINE

## 2021-08-05 PROCEDURE — 99214 OFFICE O/P EST MOD 30 MIN: CPT | Performed by: FAMILY MEDICINE

## 2021-08-05 PROCEDURE — 3017F COLORECTAL CA SCREEN DOC REV: CPT | Performed by: FAMILY MEDICINE

## 2021-08-05 PROCEDURE — 90715 TDAP VACCINE 7 YRS/> IM: CPT | Performed by: FAMILY MEDICINE

## 2021-08-05 PROCEDURE — G8427 DOCREV CUR MEDS BY ELIG CLIN: HCPCS | Performed by: FAMILY MEDICINE

## 2021-08-05 PROCEDURE — 1036F TOBACCO NON-USER: CPT | Performed by: FAMILY MEDICINE

## 2021-08-05 PROCEDURE — 17000 DESTRUCT PREMALG LESION: CPT | Performed by: FAMILY MEDICINE

## 2021-08-05 PROCEDURE — 90732 PPSV23 VACC 2 YRS+ SUBQ/IM: CPT | Performed by: FAMILY MEDICINE

## 2021-08-05 PROCEDURE — G8417 CALC BMI ABV UP PARAM F/U: HCPCS | Performed by: FAMILY MEDICINE

## 2021-08-05 PROCEDURE — 2022F DILAT RTA XM EVC RTNOPTHY: CPT | Performed by: FAMILY MEDICINE

## 2021-08-05 RX ORDER — ESTRADIOL 0.5 MG/1
TABLET ORAL
COMMUNITY
Start: 2021-05-12 | End: 2021-08-05

## 2021-08-05 RX ORDER — LOSARTAN POTASSIUM AND HYDROCHLOROTHIAZIDE 12.5; 1 MG/1; MG/1
TABLET ORAL
COMMUNITY
Start: 2021-06-26 | End: 2021-12-14 | Stop reason: SDUPTHER

## 2021-08-05 RX ORDER — DIPHENHYDRAMINE HCL 25 MG
25 TABLET ORAL EVERY 6 HOURS PRN
COMMUNITY

## 2021-08-05 ASSESSMENT — PATIENT HEALTH QUESTIONNAIRE - PHQ9
SUM OF ALL RESPONSES TO PHQ9 QUESTIONS 1 & 2: 0
SUM OF ALL RESPONSES TO PHQ QUESTIONS 1-9: 0
1. LITTLE INTEREST OR PLEASURE IN DOING THINGS: 0
2. FEELING DOWN, DEPRESSED OR HOPELESS: 0

## 2021-08-05 NOTE — PROGRESS NOTES
Immunization(s) given during visit:    Immunizations Administered     Name Date Dose Route    Pneumococcal Polysaccharide ([id number]) 8/5/2021 0.5 mL Intramuscular    Site: Deltoid- Right    Lot: [id number]    NDC: 9529-8365-48    Tdap (Boostrix, Adacel) 8/5/2021 0.5 mL Intramuscular    Site: Deltoid- Left    Lot: Alirio Mayen    ND: 95620-227-15          Most recent Vaccine Information Sheet dated 10/30/19 AND  04/01/20 given to pt

## 2021-08-19 ENCOUNTER — OFFICE VISIT (OUTPATIENT)
Dept: INTERNAL MEDICINE CLINIC | Age: 61
End: 2021-08-19
Payer: COMMERCIAL

## 2021-08-19 VITALS
DIASTOLIC BLOOD PRESSURE: 76 MMHG | SYSTOLIC BLOOD PRESSURE: 138 MMHG | TEMPERATURE: 97.8 F | WEIGHT: 185.8 LBS | BODY MASS INDEX: 33.44 KG/M2 | HEART RATE: 88 BPM

## 2021-08-19 DIAGNOSIS — E78.5 DYSLIPIDEMIA: Chronic | ICD-10-CM

## 2021-08-19 DIAGNOSIS — E11.9 CONTROLLED TYPE 2 DIABETES MELLITUS WITHOUT COMPLICATION, WITHOUT LONG-TERM CURRENT USE OF INSULIN (HCC): Primary | ICD-10-CM

## 2021-08-19 DIAGNOSIS — E66.01 CLASS 3 SEVERE OBESITY DUE TO EXCESS CALORIES WITHOUT SERIOUS COMORBIDITY IN ADULT, UNSPECIFIED BMI (HCC): ICD-10-CM

## 2021-08-19 PROBLEM — E66.09 OBESITY DUE TO EXCESS CALORIES: Status: ACTIVE | Noted: 2021-08-19

## 2021-08-19 PROCEDURE — 99204 OFFICE O/P NEW MOD 45 MIN: CPT | Performed by: INTERNAL MEDICINE

## 2021-08-19 SDOH — ECONOMIC STABILITY: FOOD INSECURITY: WITHIN THE PAST 12 MONTHS, THE FOOD YOU BOUGHT JUST DIDN'T LAST AND YOU DIDN'T HAVE MONEY TO GET MORE.: NEVER TRUE

## 2021-08-19 SDOH — ECONOMIC STABILITY: FOOD INSECURITY: WITHIN THE PAST 12 MONTHS, YOU WORRIED THAT YOUR FOOD WOULD RUN OUT BEFORE YOU GOT MONEY TO BUY MORE.: NEVER TRUE

## 2021-08-19 ASSESSMENT — SOCIAL DETERMINANTS OF HEALTH (SDOH): HOW HARD IS IT FOR YOU TO PAY FOR THE VERY BASICS LIKE FOOD, HOUSING, MEDICAL CARE, AND HEATING?: NOT VERY HARD

## 2021-08-19 NOTE — PROGRESS NOTES
[facility]  [address], [address] [facility]  Dept: [phone]  Dept Fax: [phone]      Chief Complaint   Patient presents with   [address]     Referral- Dr. Amparo Vaca Diabetes     A1C 8/5/21        HPI:    Patient presents for follow-up of diabetes. I have never seen this pt, was referred to our dietician/ diabetic clinic. Due to his insurance restrictions I am seeing him . Patient is followed regularly by Dr. Steven Bah . Current diabetic medications include:     Glucophage  500 mg  twice daily    She is a pleasant WF with obesity , here for evaluation. She does see dr. Lisandra العلي , from cardiology, as she can't tolerate  Any statins, now on shots, Q 14 days PRALUENT. . Was on Repatha, but insurance did not cover it so on above regimen. She is a non smoker, and no hx of ETOH intake, admits to   Eating bad food.      Past Medical History:   Diagnosis Date    [facility]2 [facility]diabetes mellitus[facility] type 2) ([facility] [address]     Dyslipidemia     Hypertension, essential     Statin intolerance     LFTS    Surgical menopause        Family History   Problem Relation Age of Onset    Diabetes Mother     High Blood Pressure Father     Heart Disease Father        Social History     Socioeconomic History    Marital status: Single     Spouse name: Not on file    Number of children: Not on file    Years of education: Not on file    Highest education level: Not on file   Occupational History    Not on file   Tobacco Use    Smoking status: Never Smoker    Smokeless tobacco: Never Used   Vaping Use    Vaping Use: Never assessed   Substance and Sexual Activity    Alcohol use: No    Drug use: No    Sexual activity: Not Currently   Other Topics Concern    Not on file   Social History Narrative    Not on file     Social Determinants of Health     Financial Resource Strain: Low Risk     Difficulty of Paying Living Expenses: Not very hard   Food Insecurity: No Food Insecurity    Worried About Running Out of Food in the Last Year: Never true    Ran Out of Food in the Last Year: Never true   Transportation Needs:     Lack of Transportation (Medical):  Lack of Transportation (Non-Medical):    Physical Activity:     Days of Exercise per Week:     Minutes of Exercise per Session:    Stress:     Feeling of Stress :    Social Connections:     Frequency of Communication with Friends and Family:     Frequency of Social Gatherings with Friends and Family:     Attends Religion Services:     Active Member of Clubs or Organizations:     Attends Club or Organization Meetings:     Marital Status:    Intimate Partner Violence:     Fear of Current or Ex-Partner:     Emotionally Abused:     Physically Abused:     Sexually Abused:        Review of Systems     See HPI     Duration of diabetes: May 2021  Duration of insulin therapy: NA , on oral meds  Blood Sugar readings: currently not checking   Dietary Consult: non  Opthalmology Consult: eye surgery , cataract last yr. Urine Protein: Not in the system  Last HbA1c: 6.3, and on 5/21 he was 7.1    Pertinent Physical Exam:    /76 (Site: Left Upper Arm)   Pulse 88   Temp 97.8 °F (36.6 °C)   Wt 185 lb 12.8 oz (84.3 kg)   BMI 33.44 kg/m²         Plan:    Pt with new onset of DM-2 , diagnosed in May 2021        Medication Changes: none    DM-2 agree with her dyslipidemia she needs a dietician eval  And close follow up , and significant weight loss. She is currently on shots for dyslipidemia, now with onset of DM LDL needs to be lower, last two sets 147, and 125   Pt does follow up with dr. Jacobo Miller from cardiology, no hx of CAD>    Obesity  - life style changes and dietician eval for further education, recommend regular exercise . Extensive time   Was spent counseling the pt re above.        Electronically signed by Krys Jacinto MD on 8/19/2021 at 2:10 PM

## 2021-08-19 NOTE — PATIENT INSTRUCTIONS
Patient Education        High Cholesterol: Care Instructions  Overview     Cholesterol is a type of fat in your blood. It is needed for many body functions, such as making new cells. Cholesterol is made by your body. It also comes from food you eat. High cholesterol means that you have too much of the fat in your blood. This raises your risk of a heart attack and stroke. LDL and HDL are part of your total cholesterol. LDL is the \"bad\" cholesterol. High LDL can raise your risk for coronary artery disease, heart attack, and stroke. HDL is the \"good\" cholesterol. It helps clear bad cholesterol from the body. High HDL is linked with a lower risk of coronary artery disease, heart attack, and stroke. Your cholesterol levels help your doctor find out your risk for having a heart attack or stroke. You and your doctor can talk about whether you need to lower your risk and what treatment is best for you. Treatment options include a heart-healthy lifestyle and medicine. Both options can help lower your cholesterol and your risk. The way you choose to lower your risk will depend on how high your risk is for heart attack and stroke. It will also depend on how you feel about taking medicines. Follow-up care is a key part of your treatment and safety. Be sure to make and go to all appointments, and call your doctor if you are having problems. It's also a good idea to know your test results and keep a list of the medicines you take. How can you care for yourself at home? · Eat heart-healthy foods. ? Eat fruits, vegetables, whole grains, beans, and other high-fiber foods. ? Eat lean proteins, such as seafood, lean meats, beans, nuts, and soy products. ? Eat healthy fats, such as canola and olive oil. ? Choose foods that are low in saturated fat. ? Limit sodium and alcohol. ? Limit drinks and foods with added sugar. · Be physically active. Try to do moderate activity at least 2½ hours a week.  Or try to do vigorous activity at least 1¼ hours a week. You may want to walk or try other activities, such as running, swimming, cycling, or playing tennis or team sports. · Stay at a healthy weight or lose weight by making the changes in eating and physical activity listed above. Losing just a small amount of weight, even 5 to 10 pounds, can help reduce your risk for having a heart attack or stroke. · Do not smoke. · Manage other health problems. These include diabetes and high blood pressure. If you think you may have a problem with alcohol or drug use, talk to your doctor. · If you take medicine, take it exactly as prescribed. Call your doctor if you think you are having a problem with your medicine. · Check with your doctor or pharmacist before you use any other medicines, including over-the-counter medicines. Make sure your doctor knows all of the medicines, vitamins, herbal products, and supplements you take. Taking some medicines together can cause problems. When should you call for help? Watch closely for changes in your health, and be sure to contact your doctor if:    · You need help making lifestyle changes.     · You have questions about your medicine. Where can you learn more? Go to https://[employer]umeb.vMobo. org and sign in to your [employer] account. Enter C119 in the [facility] box to learn more about \"High Cholesterol: Care Instructions. \"     If you do not have an account, please click on the \"Sign Up Now\" link. Current as of: August 31, 2020               Content Version: 12.9  © 2006-2021 Healthwise, [facility]. Care instructions adapted under license by [facility] ([facility]). If you have questions about a medical condition or this instruction, always ask your healthcare professional. [place] [address] any warranty or liability for your use of this information.

## 2021-08-30 DIAGNOSIS — E11.9 CONTROLLED TYPE 2 DIABETES MELLITUS WITHOUT COMPLICATION, WITHOUT LONG-TERM CURRENT USE OF INSULIN (HCC): Primary | ICD-10-CM

## 2021-08-31 ENCOUNTER — OFFICE VISIT (OUTPATIENT)
Dept: INTERNAL MEDICINE CLINIC | Age: 61
End: 2021-08-31
Payer: COMMERCIAL

## 2021-08-31 VITALS — HEIGHT: 63 IN | TEMPERATURE: 97.8 F | BODY MASS INDEX: 32.96 KG/M2 | WEIGHT: 186 LBS

## 2021-08-31 DIAGNOSIS — E11.9 CONTROLLED TYPE 2 DIABETES MELLITUS WITHOUT COMPLICATION, WITHOUT LONG-TERM CURRENT USE OF INSULIN (HCC): ICD-10-CM

## 2021-08-31 DIAGNOSIS — E78.5 DYSLIPIDEMIA: Chronic | ICD-10-CM

## 2021-08-31 DIAGNOSIS — E66.01 CLASS 3 SEVERE OBESITY DUE TO EXCESS CALORIES WITHOUT SERIOUS COMORBIDITY IN ADULT, UNSPECIFIED BMI (HCC): ICD-10-CM

## 2021-08-31 PROCEDURE — 97802 MEDICAL NUTRITION INDIV IN: CPT | Performed by: DIETITIAN, REGISTERED

## 2021-08-31 NOTE — PROGRESS NOTES
[address] [place] [address]. [address] [place] [address] Gucci., HoraceAugustin [facility], [facility]  [phone] (phone)  [phone] (fax)    Patient Name: Rosa Woods Date of Birth: [birth year] MRN: [id number]      Assessment: Patient is a 57 y.o. female seen for Initial MNT visit for Type 2 DB.     -Nutritionally relevant labs:   Lab Results   Component Value Date/Time    LABA1C 6.3 (H) 08/05/2021 12:47 PM    LABA1C 7.1 05/13/2021 12:00 AM    CHOL 245 05/12/2021 12:00 AM    HDL 59 05/12/2021 12:00 AM    LDLCALC 147 05/12/2021 12:00 AM    TRIG 217 05/12/2021 12:00 AM     Newly dx diabetes. -Blood sugar trends: Not required to check. Works @ [employer] - 3 days a week - 4 - 11 pm OR 8 am - 3 pm.  Lives with her daughter and her family. Son in law is a  and works with [employer] but opening up a restaurant in [facility]. Pt does not like to cook. Daughter will cook meals sometimes.    -Food recall:   Is a night owl. To bed 1 am.  But now 10 - 1030 pm  Breakfast: [employer] oat squares or cheerios with milk. Lunch: 2-3- pm - \"Fast food girl\" - likes to read on her break when out to eat. Ex. [employer]? [employer]. Loves pizza. Diet pepsi - gets 2 refills at the restaurant. If she does not drink diet pepsi at lunch, she may have 1-2 diet pepsi drinks with supper. Dinner: Grilled chicken with veggies or tacos or spaghetti. Snacks: has cut back on this. Use to eat m&ms or malted milk balls. Loves dark eladia.    -Main Beverages: has increased water throughout the day. Diet pepsi. No juice. Dislikes heat and humidity, so does not exercise outside. Exercises with stairs in the house. Yoga on Mondays. Pedometer - 0[address] steps at [employer] one day. -Impression of Dietary Intake: on average, 3 meals per day, on average, 5-7 fast food meals per week, high fat/ cholesterol, lacking routine intake of fresh fruit and veggies. .    Current Outpatient Medications on File Prior to Visit   Medication Sig Dispense Refill    metFORMIN (GLUCOPHAGE) 500 MG tablet Take 1 tablet by mouth 2 times daily (with meals) 180 tablet 3    diphenhydrAMINE (BENADRYL) 25 MG tablet Take 25 mg by mouth every 6 hours as needed for Itching      losartan-hydroCHLOROthiazide (HYZAAR) 100-12.5 MG per tablet TAKE 1 TABLET BY MOUTH EVERY DAY      estrogens, conjugated, (PREMARIN) 0.3 MG tablet Take 0.3 mg by mouth daily      PRALUENT 75 MG/ML SOAJ injection pen INJECT 1 ML INTO THE SKIN EVERY 14 DAYS 2 pen 11    loratadine (CLARITIN) 10 MG tablet Take 10 mg by mouth daily      betamethasone valerate (VALISONE) 0.1 % lotion Apply topically 2 times daily. 60 mL 0    Omega-3 Fatty Acids (FISH OIL) 1000 MG CAPS Take 2,000 mg by mouth daily      montelukast (SINGULAIR) 10 MG tablet Take 10 mg by mouth nightly      ezetimibe (ZETIA) 10 MG tablet Take 1 tablet by mouth daily 90 tablet 3    cyclobenzaprine (FLEXERIL) 10 MG tablet Take 10 mg by mouth 3 times daily as needed for Muscle spasms       No current facility-administered medications on file prior to visit. Vitals from current and previous visits:  # with dx of diabetes. Temp 97.8 °F (36.6 °C)   Ht 5' 2.5\" (1.588 m)   Wt 186 lb (84.4 kg)   BMI 33.48 kg/m²     -Body mass index is 33.48 kg/m². 30-34.9 - Obesity Grade I.   -Weight goal: lose weight. Nutrition Diagnosis:   Food and nutrition-related knowledge deficit related to Lack of previous MNT/currently undergoing MNT as evidenced by Newly diagnosis of diabetes. Intervention:  -Instructed the patient on: Carbohydrate Counting, Consistent Carbohydrate Intake, Food Label Reading, Meal Planning for Regular, Balanced Meals & Snacks and The Importance of Regular Physical Activity    -Handouts given for: carbohydrate counting, food logging, healthy snacks, 150 gms carb sample meal plans/menus.     Patient Instructions   1.)  Get familiar with reading the nutrition facts label by looking at serving size and total carbohydrates. - Without a label refer to your carb count guide booklet. 2.)  Measure foods for accuracy in carb counting.    3.)  Healthy carb choices:  whole grains, whole wheat pasta, starchy vegetables, fresh fruit and lowfat/non-fat milk and yogurt. - Look for the word whole as the 1st ingredient on your bread package or find a bread with at least 3 gms fiber/slice. 4.)  Your meal plan is found on the inside cover of your carb counting guide booklet:  1st meal or Brkf:  45 gms carbohydrates + 1-2 oz protein  2nd meal or Lunch: 45-60 gms carbohydrates + 2-3 oz protein + non-starchy veggies  3rd meal or Dinner:  45 gms carbohydrates + 2-3 oz protein + non- starchy veggies    Snack time:  15 - 20 gms carbohydrates + 1 oz protein    5.)  Choose lean protein most of the time and Cut in 1/2 added fats to help with weight loss efforts. 6.) Bring a 1-2 week food log to next dietitian appt. Thanks. 7.)  Get in extra physical activity on your days off - start with 10 minute walk in the evening and work up from there. Good job with doing yoga each week. -Nutrition prescription: 1300 - 1400 calories/day, 150 - 160 g carbs/day. <45 gms fat/day  Adj. Wt. 59 kg (129#)    Comprehension verified using teachback method. Monitoring/Evaluation:   -Followup visit: 7 weeks with dietitian.   -Receptiveness to education/goals: Agreeable.  -Evaluation of education: Indicates understanding.  -Readiness to change: precontemplative- reading the nutrition facts label and measuring foods for accuracy in carb counting.  -Expected compliance: good. Thank you for your referral of this patient. Total time involved in direct patient education: 45 minutes for initial MNT visit.

## 2021-08-31 NOTE — PATIENT INSTRUCTIONS
1. )  Get familiar with reading the nutrition facts label by looking at serving size and total carbohydrates. - Without a label refer to your carb count guide booklet. 2.)  Measure foods for accuracy in carb counting.    3.)  Healthy carb choices:  whole grains, whole wheat pasta, starchy vegetables, fresh fruit and lowfat/non-fat milk and yogurt. - Look for the word whole as the 1st ingredient on your bread package or find a bread with at least 3 gms fiber/slice. 4.)  Your meal plan is found on the inside cover of your carb counting guide booklet:  1st meal or Brkf:  45 gms carbohydrates + 1-2 oz protein  2nd meal or Lunch: 45-60 gms carbohydrates + 2-3 oz protein + non-starchy veggies  3rd meal or Dinner:  45 gms carbohydrates + 2-3 oz protein + non- starchy veggies    Snack time:  15 - 20 gms carbohydrates + 1 oz protein    5.)  Choose lean protein most of the time and Cut in 1/2 added fats to help with weight loss efforts. 6.) Bring a 1-2 week food log to next dietitian appt. Thanks. 7.)  Get in extra physical activity on your days off - start with 10 minute walk in the evening and work up from there. Good job with doing yoga each week.

## 2021-09-08 ENCOUNTER — TELEPHONE (OUTPATIENT)
Dept: CARDIOLOGY CLINIC | Age: 61
End: 2021-09-08

## 2021-09-08 NOTE — TELEPHONE ENCOUNTER
Medication: Praluent 75MG/ML auto-injectors    Cover my med's key: [id number]    PA case #: [id number]. Status: APPEAL APPROVED. Valid: 0910/2021-09/10/2022. Pharmacy aware: Spoke with Margie Rucker at [facility] and she did verify medication claim was processed by both insurances for the patient. Appeal for medication sent in via fax. 09/09/2021.

## 2021-09-17 DIAGNOSIS — E89.40 SURGICAL MENOPAUSE: ICD-10-CM

## 2021-09-17 DIAGNOSIS — J30.2 SEASONAL ALLERGIES: ICD-10-CM

## 2021-09-17 DIAGNOSIS — N95.1 VASOMOTOR SYMPTOMS DUE TO MENOPAUSE: Primary | ICD-10-CM

## 2021-09-17 DIAGNOSIS — E78.5 HYPERLIPIDEMIA, UNSPECIFIED HYPERLIPIDEMIA TYPE: ICD-10-CM

## 2021-09-17 RX ORDER — EZETIMIBE 10 MG/1
10 TABLET ORAL DAILY
Qty: 90 TABLET | Refills: 3 | Status: SHIPPED | OUTPATIENT
Start: 2021-09-17 | End: 2022-09-06

## 2021-09-17 RX ORDER — ESTRADIOL 0.5 MG/1
TABLET ORAL
Qty: 21 TABLET | Status: CANCELLED | OUTPATIENT
Start: 2021-09-17

## 2021-09-17 RX ORDER — MONTELUKAST SODIUM 10 MG/1
10 TABLET ORAL NIGHTLY
Qty: 90 TABLET | Refills: 3 | Status: SHIPPED | OUTPATIENT
Start: 2021-09-17 | End: 2021-12-14 | Stop reason: SDUPTHER

## 2021-09-17 NOTE — TELEPHONE ENCOUNTER
Diagnosis Orders   1. Vasomotor symptoms due to menopause  estrogens, conjugated, (PREMARIN) 0.3 MG tablet   2. Surgical menopause  estrogens, conjugated, (PREMARIN) 0.3 MG tablet   3.  Seasonal allergies  montelukast (SINGULAIR) 10 MG tablet

## 2021-09-17 NOTE — TELEPHONE ENCOUNTER
Yo Clark, DO 27 minutes ago (10:47 AM)   [facility]  Good Morning! I have 2 scripts that need renewal and I tried to do it through My Chart, but I wasn't allowed. Would you please renew my Montelukast SOD 10mg and Estradiol 0.5 tab? I sure appreciate your help.   Thank you,   Mchugh  Please approve or refuse Rx request:  Requested Prescriptions     Pending Prescriptions Disp Refills    montelukast (SINGULAIR) 10 MG tablet 30 tablet      Sig: Take 1 tablet by mouth nightly    estradiol (ESTRACE) 0.5 MG tablet 21 tablet      Sig: TAKE 1 TABLET BY MOUTH EVERY DAY       Next appointment:  2/8/2022

## 2021-09-17 NOTE — TELEPHONE ENCOUNTER
Recent Visits  Date Type Provider Dept   08/05/21 Office Visit Ariana Benavides DO [facility] [place]   09/22/20 Office Visit Claude Salinas, MD ([facility]) [facility]   Showing recent visits within past 540 days with a meds authorizing provider and meeting all other requirements  Future Appointments  Date Type Provider Dept   10/11/21 Appointment Claude Salinas, MD [facility]   02/08/22 Appointment Ariana Benavides DO [facility] [place]   Showing future appointments within next 150 days with a meds authorizing provider and meeting all other requirements    Future Appointments   Date Time Provider Tana Streeteristi   10/11/2021  1:30 PM Claude Salinas, MD [facility] [place]   10/19/2021  1:00 PM Siva Berman RD, LD [facility] [place]   2/8/2022 10:00 AM Ariana Benavides, [address] [place] [address]

## 2021-10-07 LAB
CHOLESTEROL, TOTAL: 196 MG/DL
CHOLESTEROL/HDL RATIO: NORMAL
HDLC SERPL-MCNC: 61 MG/DL (ref 35–70)
LDL CHOLESTEROL CALCULATED: 113 MG/DL (ref 0–160)
NONHDLC SERPL-MCNC: NORMAL MG/DL
TRIGL SERPL-MCNC: 122 MG/DL
VLDLC SERPL CALC-MCNC: 22 MG/DL

## 2021-10-11 ENCOUNTER — OFFICE VISIT (OUTPATIENT)
Dept: CARDIOLOGY CLINIC | Age: 61
End: 2021-10-11
Payer: MEDICARE

## 2021-10-11 VITALS
HEART RATE: 83 BPM | SYSTOLIC BLOOD PRESSURE: 132 MMHG | DIASTOLIC BLOOD PRESSURE: 76 MMHG | BODY MASS INDEX: 31.54 KG/M2 | WEIGHT: 178 LBS | HEIGHT: 63 IN

## 2021-10-11 DIAGNOSIS — E11.59 TYPE 2 DIABETES MELLITUS WITH OTHER CIRCULATORY COMPLICATION, WITHOUT LONG-TERM CURRENT USE OF INSULIN (HCC): ICD-10-CM

## 2021-10-11 DIAGNOSIS — E78.5 HYPERLIPIDEMIA, UNSPECIFIED HYPERLIPIDEMIA TYPE: Primary | ICD-10-CM

## 2021-10-11 PROCEDURE — 3044F HG A1C LEVEL LT 7.0%: CPT | Performed by: INTERNAL MEDICINE

## 2021-10-11 PROCEDURE — G9899 SCRN MAM PERF RSLTS DOC: HCPCS | Performed by: INTERNAL MEDICINE

## 2021-10-11 PROCEDURE — 2022F DILAT RTA XM EVC RTNOPTHY: CPT | Performed by: INTERNAL MEDICINE

## 2021-10-11 PROCEDURE — G8428 CUR MEDS NOT DOCUMENT: HCPCS | Performed by: INTERNAL MEDICINE

## 2021-10-11 PROCEDURE — 3017F COLORECTAL CA SCREEN DOC REV: CPT | Performed by: INTERNAL MEDICINE

## 2021-10-11 PROCEDURE — G8484 FLU IMMUNIZE NO ADMIN: HCPCS | Performed by: INTERNAL MEDICINE

## 2021-10-11 PROCEDURE — 1036F TOBACCO NON-USER: CPT | Performed by: INTERNAL MEDICINE

## 2021-10-11 PROCEDURE — 93000 ELECTROCARDIOGRAM COMPLETE: CPT | Performed by: INTERNAL MEDICINE

## 2021-10-11 PROCEDURE — G8417 CALC BMI ABV UP PARAM F/U: HCPCS | Performed by: INTERNAL MEDICINE

## 2021-10-11 PROCEDURE — 99213 OFFICE O/P EST LOW 20 MIN: CPT | Performed by: INTERNAL MEDICINE

## 2021-10-11 RX ORDER — ALIROCUMAB 75 MG/ML
75 INJECTION, SOLUTION SUBCUTANEOUS
Qty: 2 PEN | Refills: 11 | Status: SHIPPED | OUTPATIENT
Start: 2021-10-11 | End: 2022-08-16 | Stop reason: SDUPTHER

## 2021-10-11 NOTE — PROGRESS NOTES
[address] [facility] [address]  [facility] [address]  Dept: [phone]  Dept Fax: [phone]  Loc: [phone]    Visit Date: 10/11/2021    Ms. Anais Negro is a 57 y.o. female  who presented for: 1 year f/u. Chief Complaint   Patient presents with    Check-Up    Hyperlipidemia       HPI:   HPI     Pt is here for 1 year f/u. She has a hx of HTN and HLD. She was recently diagnosed with DMII and is currently taking metformin. Pt currently taking Praluent/zetia. Used to take repatha, but insruance did not cover and that is why she was switched to praluent/zetia. She also takes Hyzaar for HTN. Pt is not taking stating because of elevated LFTs. Pt does not complain of any side effects from the medications. BP is usually normal. No chest pain. No SOB. No unusual leg pain or swelling. Rare episodes of dizziness. Current Outpatient Medications:     alirocumab (PRALUENT) 75 MG/ML SOAJ injection pen, Inject 1 mL into the skin every 14 days, Disp: 2 pen, Rfl: 11    ezetimibe (ZETIA) 10 MG tablet, Take 1 tablet by mouth daily, Disp: 90 tablet, Rfl: 3    montelukast (SINGULAIR) 10 MG tablet, Take 1 tablet by mouth nightly, Disp: 90 tablet, Rfl: 3    estrogens, conjugated, (PREMARIN) 0.3 MG tablet, Take 1 tablet by mouth daily, Disp: 90 tablet, Rfl: 3    metFORMIN (GLUCOPHAGE) 500 MG tablet, Take 1 tablet by mouth 2 times daily (with meals), Disp: 180 tablet, Rfl: 3    diphenhydrAMINE (BENADRYL) 25 MG tablet, Take 25 mg by mouth every 6 hours as needed for Itching, Disp: , Rfl:     losartan-hydroCHLOROthiazide (HYZAAR) 100-12.5 MG per tablet, TAKE 1 TABLET BY MOUTH EVERY DAY, Disp: , Rfl:     loratadine (CLARITIN) 10 MG tablet, Take 10 mg by mouth daily, Disp: , Rfl:     betamethasone valerate (VALISONE) 0.1 % lotion, Apply topically 2 times daily. , Disp: 60 mL, Rfl: 0    Omega-3 Fatty Acids (FISH OIL) 1000 MG CAPS, Take 2,000 mg by mouth daily, Disp: , Rfl:     cyclobenzaprine (FLEXERIL) 10 MG tablet, Take 10 mg by mouth 3 times daily as needed for Muscle spasms, Disp: , Rfl:     Past Medical History  Gadiel Ward  has a past medical history of DM2 (diabetes mellitus, type 2) ([address] Dyslipidemia, Hypertension, essential, Statin intolerance, and Surgical menopause. Social History  Mchugh  reports that she has never smoked. She has never used smokeless tobacco. She reports that she does not drink alcohol and does not use drugs. Family History  Mchugh family history includes Diabetes in her mother; Heart Disease in her father; High Blood Pressure in her father. There is no family history of bicuspid aortic valve, aneurysms, heart transplant, pacemakers, defibrillators, or sudden cardiac death. Past Surgical History   Past Surgical History:   Procedure Laterality Date     SECTION       x 3     SECTION      x3    CHOLECYSTECTOMY      INTRACAPSULAR CATARACT EXTRACTION Left 10/13/2020    EXTRACAPSULAR CATARACT REMOVAL WITH INSERTION OF INTRAOCULAR LENS PROTHESIS LEFT performed by Greg Smith MD at [address] Bilateral     knees    JEANINE AND BSO  1986    Endometriosis. Review of Systems   Constitutional: Negative for chills and fever  HENT: Negative for congestion, sinus pressure, sneezing and sore throat. Eyes: Negative for pain, discharge, redness and itching. Respiratory: Negative for apnea, cough  Gastrointestinal: Negative for blood in stool, constipation, diarrhea   Endocrine: Negative for cold intolerance, heat intolerance, polydipsia. Genitourinary: Negative for dysuria, enuresis, flank pain and hematuria. Musculoskeletal: Negative for arthralgias, joint swelling and neck pain. Neurological: Negative for numbness and headaches. Psychiatric/Behavioral: Negative for agitation, confusion, decreased concentration and dysphoric mood.      Objective:     /76 Pulse 83   Ht 5' 2.5\" (1.588 m)   Wt 178 lb (80.7 kg)   BMI 32.04 kg/m²     Wt Readings from Last 3 Encounters:   10/11/21 178 lb (80.7 kg)   08/31/21 186 lb (84.4 kg)   08/19/21 185 lb 12.8 oz (84.3 kg)     BP Readings from Last 3 Encounters:   10/11/21 132/76   08/19/21 138/76   08/05/21 122/64       Nursing note and vitals reviewed. Physical Exam   Constitutional: Oriented to person, place, and time. Appears well-developed and well-nourished. HENT:   Head: Normocephalic and atraumatic. Eyes: EOM are normal. Pupils are equal, round, and reactive to light. Neck: Normal range of motion. Neck supple. No JVD present. Cardiovascular: Normal rate, regular rhythm, normal heart sounds and intact distal pulses. No murmur heard. Pulmonary/Chest: Effort normal and breath sounds normal. No respiratory distress. No wheezes. No rales. Abdominal: Soft. Bowel sounds are normal. No distension. There is no tenderness. Musculoskeletal: Normal range of motion. No edema. Neurological: Alert and oriented to person, place, and time. No cranial nerve deficit. Coordination normal.   Skin: Skin is warm and dry. Psychiatric: Normal mood and affect. No results found for: CKTOTAL, CKMB, CKMBINDEX    No results found for: WBC, RBC, HGB, HCT, MCV, MCH, MCHC, RDW, PLT, MPV    No results found for: NA, K, CL, CO2, BUN, LABALBU, CREATININE, CALCIUM, GFRAA, LABGLOM, GLUCOSE    No results found for: ALKPHOS, ALT, AST, PROT, BILITOT, BILIDIR, IBILI, LABALBU    No results found for: MG    No results found for: INR, PROTIME      Lab Results   Component Value Date    LABA1C 6.3 08/05/2021    LABA1C 7.1 05/13/2021       Lab Results   Component Value Date    TRIG 122 10/07/2021    HDL 61 10/07/2021    [address] 113 10/07/2021       No results found for: TSH      Testing Reviewed:      I have individually reviewed the cardiac test below:    ECHO: No results found for this or any previous visit.        Assessment/Plan   HLD - likely familial  Failed statin and caused elevated LFTs. Pt is currently on praluent and zetia. Latest lipid panel showed that praluent and zetia resulted in great HLD control. Hx of HTN well controlled with Hyzaar. Plan:     1. Continue with praluent and zetia. Find a way around insurance to get praluent covered. 2. Continue with hyzaar for HTN. 3. F/u in 1 year. Disposition:  1 year     Electronically signed by Anjana Hill   10/11/2021 at 1:39 PM EDT      Attending Supervising Physician's JOHN Jaure[address]  I performed a history and physical examination on the patient and discussed the management with the resident physician/med student. I reviewed and agree with the findings and plan as documented in the resident's/medical student's note.   Electronically signed by Donna Douglas MD on 10/11/21 at 2:11 PM EDT

## 2021-10-12 ENCOUNTER — TELEPHONE (OUTPATIENT)
Dept: CARDIOLOGY CLINIC | Age: 61
End: 2021-10-12

## 2021-10-12 NOTE — TELEPHONE ENCOUNTER
Looks like Praluent has already been approved from 9/10/2021-9/10/2022. I called and talked to patient and she states she received a call and the medication is being overnighted to her and she should receive it by tomorrow.

## 2021-11-05 ENCOUNTER — OFFICE VISIT (OUTPATIENT)
Dept: FAMILY MEDICINE CLINIC | Age: 61
End: 2021-11-05
Payer: MEDICARE

## 2021-11-05 VITALS
HEART RATE: 86 BPM | OXYGEN SATURATION: 98 % | SYSTOLIC BLOOD PRESSURE: 130 MMHG | BODY MASS INDEX: 31.76 KG/M2 | TEMPERATURE: 97.8 F | RESPIRATION RATE: 12 BRPM | DIASTOLIC BLOOD PRESSURE: 70 MMHG | HEIGHT: 63 IN | WEIGHT: 179.25 LBS

## 2021-11-05 DIAGNOSIS — Z00.00 VISIT FOR PREVENTIVE HEALTH EXAMINATION: Primary | ICD-10-CM

## 2021-11-05 PROBLEM — L57.0 AK (ACTINIC KERATOSIS): Status: RESOLVED | Noted: 2021-08-05 | Resolved: 2021-11-05

## 2021-11-05 PROBLEM — E66.09 OBESITY DUE TO EXCESS CALORIES: Status: RESOLVED | Noted: 2021-08-19 | Resolved: 2021-11-05

## 2021-11-05 PROCEDURE — G8484 FLU IMMUNIZE NO ADMIN: HCPCS | Performed by: FAMILY MEDICINE

## 2021-11-05 PROCEDURE — 99396 PREV VISIT EST AGE 40-64: CPT | Performed by: FAMILY MEDICINE

## 2021-11-05 NOTE — PROGRESS NOTES
Chief Complaint   Patient presents with    Annual Exam     Well Visit       History obtained from the patient. SUBJECTIVE:  General Buerger is a 57 y.o. female that presents today for     -Prev Med: Vaccines reviewed. Reviewed if routine labs are due. Denies family history of colon, prostate or ovarian cancer. 1/2 sister with breast cancer. Denies breast or bowl habit changes. Denies fevers, chills, night sweats or wt loss. Diet - good  Exercise - good  Sleep - good      Age/Gender Health Maintenance    Lipid -   Lab Results   Component Value Date    CHOL 196 10/07/2021    CHOL 245 05/12/2021    CHOL 239 09/22/2020     Lab Results   Component Value Date    TRIG 122 10/07/2021    TRIG 217 05/12/2021    TRIG 317 09/22/2020     Lab Results   Component Value Date    HDL 61 10/07/2021    HDL 59 05/12/2021    HDL 58 09/22/2020     Lab Results   Component Value Date    LDLCALC 113 10/07/2021    LDLCALC 147 05/12/2021    LDLCALC 125 09/22/2020     Lab Results   Component Value Date    VLDL 22 10/07/2021    VLDL 39 05/12/2021    VLDL 56 09/22/2020     No results found for: CHOLHDLRATIO    Colon Cancer Screening - records pending,   Lung Cancer Screening (Age 54 to [de-identified] with 30 pack year hx, current smoker or quit within past 15 years) - never soker    Tetanus - UTD AUG 2021  Influenza Vaccine - Candidate FALL 2021  Pneumonia Vaccine - UTD AUG 2021  Zoster - 1 of 2 completed JAN 2021     Breast Cancer Screening - NEG AUG 2021  Cervical Cancer Screening - hyst for benign reasons.    Osteoporosis Screening - neg June 2021      Diabetes Health Maintenance    A1C -   Lab Results   Component Value Date    LABA1C 6.3 08/05/2021    LABA1C 7.1 05/13/2021       ACE/ARB - yes, cozaar  Eye - records pending  Foot - UTD AUG 2021  ASA - no  Microal/Cr - No results found for: LABMICR, LABCREA, MACRR  No results found for: CREATINEUR, MICROALBUR, MALBCR    eGFR - No results found for: GFRESTIMATE  No results found for: LABGLOM  No results found for: CRCLEARANCE  No results found for: EGFRNONAA  No results found for: EGFRAA      Statin - no, intolerant      Current Outpatient Medications   Medication Sig Dispense Refill    alirocumab (PRALUENT) 75 MG/ML SOAJ injection pen Inject 1 mL into the skin every 14 days 2 pen 11    ezetimibe (ZETIA) 10 MG tablet Take 1 tablet by mouth daily 90 tablet 3    montelukast (SINGULAIR) 10 MG tablet Take 1 tablet by mouth nightly 90 tablet 3    estrogens, conjugated, (PREMARIN) 0.3 MG tablet Take 1 tablet by mouth daily 90 tablet 3    metFORMIN (GLUCOPHAGE) 500 MG tablet Take 1 tablet by mouth 2 times daily (with meals) 180 tablet 3    diphenhydrAMINE (BENADRYL) 25 MG tablet Take 25 mg by mouth every 6 hours as needed for Itching      losartan-hydroCHLOROthiazide (HYZAAR) 100-12.5 MG per tablet TAKE 1 TABLET BY MOUTH EVERY DAY      loratadine (CLARITIN) 10 MG tablet Take 10 mg by mouth daily      betamethasone valerate (VALISONE) 0.1 % lotion Apply topically 2 times daily. 60 mL 0    Omega-3 Fatty Acids (FISH OIL) 1000 MG CAPS Take 2,000 mg by mouth daily      cyclobenzaprine (FLEXERIL) 10 MG tablet Take 10 mg by mouth 3 times daily as needed for Muscle spasms       No current facility-administered medications for this visit. No orders of the defined types were placed in this encounter. All medications reviewed and reconciled, including OTC and herbal medications. Updated list given to patient. Patient Active Problem List    Diagnosis Date Noted    DM2 [facility]diabetes mellitus, type 2) ([facility] [address]     Obesity (BMI 30-39. 9)     Epidermal cyst of face 08/05/2021    Bilateral temporomandibular joint pain 08/05/2021    Chronic venous insufficiency of lower extremity 08/05/2021    Venous stasis dermatitis of both lower extremities 08/05/2021    Hypertension, essential     Surgical menopause     Statin intolerance      LFTS      Dyslipidemia        Past Medical History:   Diagnosis Date    [facility]di[facility] mellitus, [facility] 2) ([facility] [address]     Dyslipidemia     Hypertension, essential     Obesity (BMI 30-39. 9)     Statin intolerance     LFTS    Surgical menopause        Past Surgical History:   Procedure Laterality Date     SECTION       x 3     SECTION      x3    CHOLECYSTECTOMY      INTRACAPSULAR CATARACT EXTRACTION Left 10/13/2020    EXTRACAPSULAR CATARACT REMOVAL WITH INSERTION OF INTRAOCULAR LENS PROTHESIS LEFT performed by Lorelee Paget, MD at [address] Bilateral     knees    JEANINE AND BSO  1986    Endometriosis. Allergies   Allergen Reactions    Statins      Liver issues    Valium [Diazepam] Other (See Comments)     Patient gets very irritated       Social History     Tobacco Use    Smoking status: Never Smoker    Smokeless tobacco: Never Used   Substance Use Topics    Alcohol use: No       Family History   Problem Relation Age of Onset    Diabetes Mother     High Blood Pressure Father     Heart Disease Father     Breast Cancer Other         1/2 sister    Colon Cancer Neg Hx          I have reviewed the patient's past medical history, past surgical history, allergies, medications, social and family history and I have made updates where appropriate.       Review of Systems  Positive responses are highlighted in bold    Constitutional:  Fever, Chills, Night Sweats, Fatigue, Unexpected changes in weight  Eyes:  Eye discharge, Eye pain, Eye redness, Visual disturbances   HENT:  Ear pain, Tinnitus, Nosebleeds, Trouble swallowing, Hearing loss, Sore throat  Cardiovascular:  Chest Pain, Palpitations, Orthopnea, Paroxysmal Nocturnal Dyspnea  Respiratory:  Cough, Wheezing, Shortness of breath, Chest tightness, Apnea  Gastrointestinal:  Nausea, Vomiting, Diarrhea, Constipation, Heartburn, Blood in stool  Genitourinary:  Difficulty or painful urination, Flank pain, Change in frequency, Urgency  Skin:  Color change, Rash, Itching, Wound  Psychiatric:  Hallucinations, Anxiety, Depression, Suicidal ideation  Hematological:  Enlarged glands, Easy bleeding, Easily bruising  Musculoskeletal:  Joint pain, Back pain, Gait problems, Joint swelling, Myalgias  Neurological:  Dizziness, Headaches, Presyncope, Numbness, Seizures, Tremors  Allergy:  Environmental allergies, Food allergies  Endocrine:  Heat Intolerance, Cold Intolerance, Polydipsia, Polyphagia, Polyuria      PHYSICAL EXAM:  Vitals:    11/05/21 1022   BP: 130/70   Pulse: 86   Resp: 12   Temp: 97.8 °F (36.6 °C)   TempSrc: Oral   SpO2: 98%   Weight: 179 lb 4 oz (81.3 kg)   Height: 5' 2.5\" (1.588 m)     Body mass index is 32.26 kg/m². Pain Score:   0 - No pain    VS Reviewed  General Appearance: A&O x 3, No acute distress,well developed and well- nourished  Head: normocephalic and atraumatic  Eyes: pupils equal, round, and reactive to light, extraocular eye movements intact, conjunctivae and eye lids without erythema  ENT: external ear and ear canal clear bilaterally, TMs intact and regular, nose without deformity, nasal mucosa and turbinates normal without polyps, oropharynx normal, dentition is normal for age. Mild TTP along both TMJ. Zig-zag opening with click. Small pea sized cyst on mid cheek on L side. Non-tender, firm and mobile. Neck: supple and non-tender without mass, no thyromegaly or thyroid nodules, no cervical lymphadenopathy  Pulmonary/Chest: clear to auscultation bilaterally- no wheezes, rales or rhonchi, normal air movement, no respiratory distress or retractions  Cardiovascular: S1 and S2 auscultated w/ RRR. No murmurs, rubs, clicks, or gallops, distal pulses intact. Abdomen: soft, non-tender, non-distended, bowel sounds physiologic,  no rebound or guarding, no masses or hernias noted. Liver and spleen without enlargement. Extremities: no cyanosis, clubbing of the lower extremities. Trace bilat LE edema. +2 PT/DP bilaterally.    Musculoskeletal: No joint swelling or gross deformity   Neuro:  Alert, 5/5 strength globally and symmetrically, 2+ patellar reflexes bilaterally  Psych: Affect appropriate. Mood normal. Thought process is normal without evidence of depression or psychosis. Good insight and appropriate interaction. Cognition and memory appear to be intact. Skin: warm and dry, no rash or erythema   Lymph:  No cervical, auricular or supraclavicular lymph nodes palpated      ASSESSMENT & PLAN  1. Visit for preventive health examination    Vaccines reviewed and update recommendations made  Pt will schedule flu/shingrix vacc      DISPOSITION    Return in 3 months (on 2/8/2022) for Follow-up Diabetes, sooner as needed. Mchugh released without restrictions. Future Appointments   Date Time Provider aTna Samia   2/8/2022 10:00 AM [id number]   10/13/2022  1:30 PM Vasile Madsen MD [facility] [address]     PATIENT COUNSELING    Barriers to learning and self management: none    Discussed use, benefit, and side effects of prescribed medications. Barriers to medication compliance addressed. All patient questions answered. Pt voiced understanding.        Electronically signed by Gavino Balbuena DO on 11/5/2021 at 10:42 AM

## 2021-11-05 NOTE — PATIENT INSTRUCTIONS
Patient Education        Well Visit, Women 48 to 72: Care Instructions  Overview     Well visits can help you stay healthy. Your doctor has checked your overall health and may have suggested ways to take good care of yourself. Your doctor also may have recommended tests. At home, you can help prevent illness with healthy eating, regular exercise, and other steps. Follow-up care is a key part of your treatment and safety. Be sure to make and go to all appointments, and call your doctor if you are having problems. It's also a good idea to know your test results and keep a list of the medicines you take. How can you care for yourself at home? · Get screening tests that you and your doctor decide on. Screening helps find diseases before any symptoms appear. · Eat healthy foods. Choose fruits, vegetables, whole grains, protein, and low-fat dairy foods. Limit fat, especially saturated fat. Reduce salt in your diet. · Limit alcohol. Have no more than 1 drink a day or 7 drinks a week. · Get at least 30 minutes of exercise on most days of the week. Walking is a good choice. You also may want to do other activities, such as running, swimming, cycling, or playing tennis or team sports. · Reach and stay at a healthy weight. This will lower your risk for many problems, such as obesity, diabetes, heart disease, and high blood pressure. · Do not smoke. Smoking can make health problems worse. If you need help quitting, talk to your doctor about stop-smoking programs and medicines. These can increase your chances of quitting for good. · Care for your mental health. It is easy to get weighed down by worry and stress. Learn strategies to manage stress, like deep breathing and mindfulness, and stay connected with your family and community. If you find you often feel sad or hopeless, talk with your doctor. Treatment can help.   · Talk to your doctor about whether you have any risk factors for sexually transmitted infections (STIs). You can help prevent STIs if you wait to have sex with a new partner (or partners) until you've each been tested for STIs. It also helps if you use condoms (male or female condoms) and if you limit your sex partners to one person who only has sex with you. Vaccines are available for some STIs. · If you think you may have a problem with alcohol or drug use, talk to your doctor. This includes prescription medicines (such as amphetamines and opioids) and illegal drugs (such as cocaine and methamphetamine). Your doctor can help you figure out what type of treatment is best for you. · Protect your skin from too much sun. When you're outdoors from 10 a.m. to 4 p.m., stay in the shade or cover up with clothing and a hat with a wide brim. Wear sunglasses that block UV rays. Even when it's cloudy, put broad-spectrum sunscreen (SPF 30 or higher) on any exposed skin. · See a dentist one or two times a year for checkups and to have your teeth cleaned. · Wear a seat belt in the car. When should you call for help? Watch closely for changes in your health, and be sure to contact your doctor if you have any problems or symptoms that concern you. Where can you learn more? Go to [email or web address] and sign in to your [employer] account. Enter G479 in the [facility] box to learn more about \"Well Visit, Women 50 to 72: Care Instructions. \"     If you do not have an account, please click on the \"Sign Up Now\" link. Current as of: February 11, 2021               Content Version: 13.0  © 0149-6310 Healthwise, Incorporated. Care instructions adapted under license by [facility] ([facility]). If you have questions about a medical condition or this instruction, always ask your healthcare professional. [place] [address] any warranty or liability for your use of this information.

## 2021-11-08 DIAGNOSIS — N95.1 VASOMOTOR SYMPTOMS DUE TO MENOPAUSE: ICD-10-CM

## 2021-11-08 DIAGNOSIS — E89.40 SURGICAL MENOPAUSE: ICD-10-CM

## 2021-11-08 NOTE — TELEPHONE ENCOUNTER
Patient called to request the following be sent to her local pharmacy as her insurance does not cover [facility]  Please approve or refuse Rx request:  Requested Prescriptions     Pending Prescriptions Disp Refills    estrogens, conjugated, (PREMARIN) 0.3 MG tablet 90 tablet 3     Sig: Take 1 tablet by mouth daily       Next appointment:  2/8/2022

## 2021-12-14 DIAGNOSIS — I10 HYPERTENSION, ESSENTIAL: Primary | ICD-10-CM

## 2021-12-14 DIAGNOSIS — J30.2 SEASONAL ALLERGIES: ICD-10-CM

## 2021-12-14 RX ORDER — MONTELUKAST SODIUM 10 MG/1
10 TABLET ORAL NIGHTLY
Qty: 90 TABLET | Refills: 3 | Status: SHIPPED | OUTPATIENT
Start: 2021-12-14

## 2021-12-14 RX ORDER — LOSARTAN POTASSIUM AND HYDROCHLOROTHIAZIDE 12.5; 1 MG/1; MG/1
1 TABLET ORAL DAILY
Qty: 90 TABLET | Refills: 3 | Status: SHIPPED | OUTPATIENT
Start: 2021-12-14

## 2021-12-20 ENCOUNTER — OFFICE VISIT (OUTPATIENT)
Dept: FAMILY MEDICINE CLINIC | Age: 61
End: 2021-12-20
Payer: MEDICARE

## 2021-12-20 VITALS
DIASTOLIC BLOOD PRESSURE: 60 MMHG | TEMPERATURE: 98 F | HEIGHT: 62 IN | WEIGHT: 164 LBS | RESPIRATION RATE: 12 BRPM | SYSTOLIC BLOOD PRESSURE: 104 MMHG | HEART RATE: 88 BPM | BODY MASS INDEX: 30.18 KG/M2 | OXYGEN SATURATION: 98 %

## 2021-12-20 DIAGNOSIS — Z23 NEEDS FLU SHOT: ICD-10-CM

## 2021-12-20 DIAGNOSIS — M76.61 ACHILLES TENDINITIS OF RIGHT LOWER EXTREMITY: ICD-10-CM

## 2021-12-20 DIAGNOSIS — L03.211 FACIAL CELLULITIS: Primary | ICD-10-CM

## 2021-12-20 PROCEDURE — 1036F TOBACCO NON-USER: CPT | Performed by: FAMILY MEDICINE

## 2021-12-20 PROCEDURE — 99213 OFFICE O/P EST LOW 20 MIN: CPT | Performed by: FAMILY MEDICINE

## 2021-12-20 PROCEDURE — 90471 IMMUNIZATION ADMIN: CPT | Performed by: FAMILY MEDICINE

## 2021-12-20 PROCEDURE — G8482 FLU IMMUNIZE ORDER/ADMIN: HCPCS | Performed by: FAMILY MEDICINE

## 2021-12-20 PROCEDURE — 90674 CCIIV4 VAC NO PRSV 0.5 ML IM: CPT | Performed by: FAMILY MEDICINE

## 2021-12-20 PROCEDURE — G8417 CALC BMI ABV UP PARAM F/U: HCPCS | Performed by: FAMILY MEDICINE

## 2021-12-20 PROCEDURE — G8427 DOCREV CUR MEDS BY ELIG CLIN: HCPCS | Performed by: FAMILY MEDICINE

## 2021-12-20 PROCEDURE — 3017F COLORECTAL CA SCREEN DOC REV: CPT | Performed by: FAMILY MEDICINE

## 2021-12-20 PROCEDURE — G9899 SCRN MAM PERF RSLTS DOC: HCPCS | Performed by: FAMILY MEDICINE

## 2021-12-20 RX ORDER — CLINDAMYCIN HYDROCHLORIDE 150 MG/1
150 CAPSULE ORAL 3 TIMES DAILY
Qty: 21 CAPSULE | Refills: 0 | Status: SHIPPED | OUTPATIENT
Start: 2021-12-20 | End: 2021-12-27

## 2021-12-20 NOTE — PROGRESS NOTES
Emi Garcia  No results found for: [place] S[place]eper, MALBCR    eGFR - No results found for: GFRESTIMATE  No results found for: LABGLOM  No results found for: CRCLEARANCE  No results found for: EGFRNONAA  No results found for: EGFRAA      Statin - no, intolerant      Current Outpatient Medications   Medication Sig Dispense Refill    clindamycin (CLEOCIN) 150 MG capsule Take 1 capsule by mouth 3 times daily for 7 days 21 capsule 0    mupirocin (BACTROBAN) 2 % ointment Apply topically 3 times daily x 7 days 22 g 0    losartan-hydroCHLOROthiazide (HYZAAR) 100-12.5 MG per tablet Take 1 tablet by mouth daily TAKE 1 TABLET BY MOUTH EVERY DAY 90 tablet 3    montelukast (SINGULAIR) 10 MG tablet Take 1 tablet by mouth nightly 90 tablet 3    estrogens, conjugated, (PREMARIN) 0.3 MG tablet Take 1 tablet by mouth daily 90 tablet 3    alirocumab (PRALUENT) 75 MG/ML SOAJ injection pen Inject 1 mL into the skin every 14 days 2 pen 11    ezetimibe (ZETIA) 10 MG tablet Take 1 tablet by mouth daily 90 tablet 3    metFORMIN (GLUCOPHAGE) 500 MG tablet Take 1 tablet by mouth 2 times daily (with meals) 180 tablet 3    diphenhydrAMINE (BENADRYL) 25 MG tablet Take 25 mg by mouth every 6 hours as needed for Itching      loratadine (CLARITIN) 10 MG tablet Take 10 mg by mouth daily      betamethasone valerate (VALISONE) 0.1 % lotion Apply topically 2 times daily. 60 mL 0    Omega-3 Fatty Acids (FISH OIL) 1000 MG CAPS Take 2,000 mg by mouth daily      cyclobenzaprine (FLEXERIL) 10 MG tablet Take 10 mg by mouth 3 times daily as needed for Muscle spasms       No current facility-administered medications for this visit.      Orders Placed This Encounter   Medications    clindamycin (CLEOCIN) 150 MG capsule     Sig: Take 1 capsule by mouth 3 times daily for 7 days     Dispense:  21 capsule     Refill:  0    mupirocin (BACTROBAN) 2 % ointment     Sig: Apply topically 3 times daily x 7 days     Dispense:  22 g     Refill:  0 All medications reviewed and reconciled, including OTC and herbal medications. Updated list given to patient. Patient Active Problem List    Diagnosis Date Noted    DM2 (diabetes mellitus, type 2) ([facility] [address]     Obesity (BMI 30-39. 9)     Epidermal cyst of face 2021    Bilateral temporomandibular joint pain 2021    Chronic venous insufficiency of lower extremity 2021    Venous stasis dermatitis of both lower extremities 2021    Hypertension, essential     Surgical menopause     Statin intolerance      LFTS      Dyslipidemia        Past Medical History:   Diagnosis Date    [facility]     Dyslipidemia     Hypertension, essential     Obesity (BMI 30-39. 9)     Statin intolerance     LFTS    Surgical menopause        Past Surgical History:   Procedure Laterality Date     SECTION       x 3     SECTION      x3    CHOLECYSTECTOMY      INTRACAPSULAR CATARACT EXTRACTION Left 10/13/2020    EXTRACAPSULAR CATARACT REMOVAL WITH INSERTION OF INTRAOCULAR LENS PROTHESIS LEFT performed by Elle Engle MD at [address] Bilateral     knees    JEANINE AND BSO  1986    Endometriosis. Allergies   Allergen Reactions    Statins      Liver issues    Valium [Diazepam] Other (See Comments)     Patient gets very irritated       Social History     Tobacco Use    Smoking status: Never Smoker    Smokeless tobacco: Never Used   Substance Use Topics    Alcohol use: No       Family History   Problem Relation Age of Onset    Diabetes Mother     High Blood Pressure Father     Heart Disease Father     Breast Cancer Other         1/2 sister    Colon Cancer Neg Hx          I have reviewed the patient's past medical history, past surgical history, allergies, medications, social and family history and I have made updates where appropriate.       Review of Systems  Positive responses are highlighted in bold    Constitutional:  Fever, Chills, Night Sweats, Fatigue, Unexpected changes in weight  HENT:  Ear pain, Tinnitus, Nosebleeds, Trouble swallowing, Hearing loss, Sore throat  Cardiovascular:  Chest Pain, Palpitations, Orthopnea, Paroxysmal Nocturnal Dyspnea  Respiratory:  Cough, Wheezing, Shortness of breath, Chest tightness, Apnea  Gastrointestinal:  Nausea, Vomiting, Diarrhea, Constipation, Heartburn, Blood in stool  Genitourinary:  Difficulty or painful urination, Flank pain, Change in frequency, Urgency  Skin:  Color change, Rash, Itching, Wound  Musculoskeletal:  Joint pain, Back pain, Gait problems, Joint swelling, Myalgias  Neurological:  Dizziness, Headaches, Presyncope, Numbness, Seizures, Tremors  Endocrine:  Heat Intolerance, Cold Intolerance, Polydipsia, Polyphagia, Polyuria      PHYSICAL EXAM:  Vitals:    12/20/21 1348   BP: 104/60   Pulse: 88   Resp: 12   Temp: 98 °F (36.7 °C)   TempSrc: Oral   SpO2: 98%   Weight: 164 lb (74.4 kg)   Height: 5' 2\" (1.575 m)     Body mass index is 30 kg/m². Pain Score:   2 (Nose)    VS Reviewed  General Appearance: A&O x 3, No acute distress,well developed and well- nourished  Eyes: pupils equal, round, and reactive to light, extraocular eye movements intact, conjunctivae and eye lids without erythema  ENT: external ear and ear canal clear bilaterally, TMs intact and regular, nose without deformity other than mild swelling and internal redness, nasal mucosa and turbinates normal without polyps, oropharynx normal, dentition is normal for age  Neck: supple and non-tender without mass, no thyromegaly or thyroid nodules, no cervical lymphadenopathy  Pulmonary/Chest: clear to auscultation bilaterally- no wheezes, rales or rhonchi, normal air movement, no respiratory distress or retractions  Cardiovascular: S1 and S2 auscultated w/ RRR. No murmurs, rubs, clicks, or gallops, distal pulses intact.   Abdomen: soft, non-tender, non-distended, bowel sounds physiologic,  no rebound or guarding, no masses or hernias noted. Liver and spleen without enlargement. Extremities: no cyanosis, clubbing or edema of the lower extremities. Skin: warm and dry, no rash or erythema  R Foot: point tenderness over the heel pad and tenderness at Achilles tendon insertion      ASSESSMENT & PLAN  1. Facial cellulitis    clinda tid x 7 days  Bactroban tid x 7 days  F/u if no better  Reviewed ER precautions, pt understands. - clindamycin (CLEOCIN) 150 MG capsule; Take 1 capsule by mouth 3 times daily for 7 days  Dispense: 21 capsule; Refill: 0  - mupirocin (BACTROBAN) 2 % ointment; Apply topically 3 times daily x 7 days  Dispense: 22 g; Refill: 0    2. Achilles tendinitis of right lower extremity    HEP and icing protocol given  F/u if no better    3. Needs flu shot    - INFLUENZA, MDCK QUADV, 2 YRS AND OLDER, IM, PF, PREFILL SYR OR SDV, 0.5ML (FLUCELVAX QUADV, PF)      DISPOSITION    Return if symptoms worsen or fail to improve. Mchugh released without restrictions. Future Appointments   Date Time Provider Tana Gracia   2/8/2022 10:00 AM [address]   10/13/2022  1:30  [facility], MD [facility] [address]     PATIENT COUNSELING    Barriers to learning and self management: none    Discussed use, benefit, and side effects of prescribed medications. Barriers to medication compliance addressed. All patient questions answered. Pt voiced understanding.        Electronically signed by [place], DO on 12/20/2021 at 2:36 PM

## 2021-12-20 NOTE — PROGRESS NOTES
Vaccine Information Sheet, \"Influenza - Inactivated\"  given to Caron Erickson, or parent/legal guardian of  Caron Erickson and verbalized understanding. Patient responses:    Have you ever had a reaction to a flu vaccine? No  Do you have an allergy to eggs, neomycin or polymixin? No  Do you have an allergy to Thimerosal, contact lens solution, or Merthiolate? No  Have you ever had [place]illian [place] Syndrome? No  Do you have any current illness? No  Do you have a temperature above 100 degrees? No  Are you pregnant? No  If pregnant, permission obtained from physician? No  Do you have an active neurological disorder? No      Flu vaccine given per order. Please see immunization tab.

## 2021-12-20 NOTE — PROGRESS NOTES
Immunization(s) given during visit:    Immunizations Administered     Name Date Dose Route    Influenza, MDCK Quadv, IM, PF (Flucelvax 2 yrs and older) 12/20/2021 0.5 mL Intramuscular    Site: Deltoid- Right    Lot: [id number]    NDC: 10918-880-68          Most recent Vaccine Information Sheet dated 08/06/21 given to pt

## 2021-12-20 NOTE — PATIENT INSTRUCTIONS
Patient Education        Achilles Tendon: Exercises  Introduction  Here are some examples of exercises for you to try. The exercises may be suggested for a condition or for rehabilitation. Start each exercise slowly. Ease off the exercises if you start to have pain. You will be told when to start these exercises and which ones will work best for you. How to do the exercises  Toe stretch    1. Sit in a chair, and extend your affected leg so that your heel is on the floor. 2. With your hand, reach down and pull your big toe up and back. Pull toward your ankle and away from the floor. 3. Hold the position for at least 15 to 30 seconds. 4. Repeat 2 to 4 times a session, several times a day. Calf-plantar fascia stretch    1. Sit with your legs extended and knees straight. 2. Place a towel around your foot just under the toes. 3. Hold each end of the towel in each hand, with your hands above your knees. 4. Pull back with the towel so that your foot stretches toward you. 5. Hold the position for at least 15 to 30 seconds. 6. Repeat 2 to 4 times a session, up to 5 sessions a day. Floor stretch    1. Stand about 2 feet from a wall, and place your hands on the wall at about shoulder height. Or you can stand behind a chair, placing your hands on the back of it for balance. 2. Step back with the leg you want to stretch. Keep the leg straight, and press your heel into the floor with your toe turned slightly in.  3. Lean forward, and bend your other leg slightly. Feel the stretch in the Achilles tendon of your back leg. Hold for at least 15 to 30 seconds. 4. Repeat 2 to 4 times a session, up to 5 sessions a day. Stair stretch    1. Stand with the balls of both feet on the edge of a step or curb (or a medium-sized phone book). With at least one hand, hold onto something solid for balance, such as a banister or handrail.   2. Keeping your affected leg straight, slowly let that heel hang down off of the step or curb until you feel a stretch in the back of your calf and/or Achilles area. Some of your weight should still be on the other leg. 3. Hold this position for at least 15 to 30 seconds. 4. Repeat 2 to 4 times a session, up to 5 times a day or whenever your Achilles tendon starts to feel tight. This stretch can also be done with your knee slightly bent. Strength exercise    1. This exercise will get you started on building strength after an Achilles tendon injury. Your doctor or physical therapist can help you move on to more challenging exercises as you heal and get stronger. 2. Stand on a step with your heel off the edge of the step. Hold on to a handrail or wall for balance. 3. Push up on your toes, then slowly count to 10 as you lower yourself back down until your heel is below the step. If it hurts to push up on your toes, try putting most of your weight on your other foot as you push up, or try using your arms to help you. If you can't do this exercise without causing pain, stop the exercise and talk to your doctor. 4. Repeat the exercise 8 to 12 times, half with the knee straight and half with the knee bent. Follow-up care is a key part of your treatment and safety. Be sure to make and go to all appointments, and call your doctor if you are having problems. It's also a good idea to know your test results and keep a list of the medicines you take. Where can you learn more? Go to https://[employer]. org and sign in to your [employer] account. Enter Q685 in the [facility] box to learn more about \"Achilles Tendon: Exercises. \"     If you do not have an account, please click on the \"Sign Up Now\" link. Current as of: July 1, 2021               Content Version: 13.0  © 2006-2021 [employer]. Care instructions adapted under license by [facility] ([facility]).  If you have questions about a medical condition or this instruction, always ask your healthcare professional. [employer] disclaims any warranty or liability for your use of this information.

## 2022-02-07 NOTE — PROGRESS NOTES
Chief Complaint   Patient presents with    Follow-up     DM2 and chronic conditions as noted below    Back Pain     Started last wk       History obtained from the patient. SUBJECTIVE:  Tyrese Gibson is a 57 y.o. female that presents today     -DM2: Dx end of May 2021  startd on metformin 500mg bid  a1c was 7.1 previously, down to 6.1 this last time      -HTN:    HPI:    Taking meds as prescribed ?: yes  Tolerating well ?: yes  Side Effects ?: denies  BP at home ?: <140/90  Working on TLCS ?: yes  Chest Pain/SOB/Palpitations? denies    BP Readings from Last 3 Encounters:   02/08/22 118/64   12/20/21 104/60   11/05/21 130/70       -HLD:    HPI:  Intolerant of statins  On zetia and Praluent    Taking meds as prescribed ?: yes  Tolerating well ?: yes  Side Effects ?: denies  Muscle Pain?: denies  Working on TLCS ?: yes      -Surgical menopause: On premarin  Unable to come off completely d/t hot flashes  On lowest dose      -Low Back Pain:    HPI:   Started last wk after shoveling  Low back  Radiates to buttocks/legs    She describes the pain as aching, throbbing  in the lumbar region. Inciting injury or history of trauma? No  Pain is relieved by - rest  Pain is aggravated by - walking, bending  Radiation of the pain? Yes - bilat LE  Paresthesias of the extremities? No  Saddle anesthesia? No  Bowel or bladder incontinence?  No  Treatments tried - otc meds      Age/Gender Health Maintenance    Lipid -   Lab Results   Component Value Date    CHOL 196 10/07/2021    CHOL 245 05/12/2021    CHOL 239 09/22/2020     Lab Results   Component Value Date    TRIG 122 10/07/2021    TRIG 217 05/12/2021    TRIG 317 09/22/2020     Lab Results   Component Value Date    HDL 61 10/07/2021    HDL 59 05/12/2021    HDL 58 09/22/2020     Lab Results   Component Value Date    LDLCALC 113 10/07/2021    LDLCALC 147 05/12/2021    LDLCALC 125 09/22/2020     Lab Results   Component Value Date    VLDL 22 10/07/2021    VLDL 39 05/12/2021 VLDL 56 09/22/2020     No results found for: CHOLHDLRATIO    Colon Cancer Screening - Small polyp OCT 2013, repeat 7 to 10 years, per Sheik DE LA CRUZ. Lung Cancer Screening (Age 53 to [de-identified] with 30 pack year hx, current smoker or quit within past 15 years) - never soker    Tetanus - UTD AUG 2021  Influenza Vaccine - UTD FALL 2021  Pneumonia Vaccine - UTD AUG 2021  Zoster - 1 of 2 completed JAN 2021, pt will schedule 2nd dose. Breast Cancer Screening - NEG AUG 2021  Cervical Cancer Screening - hyst for benign reasons.    Osteoporosis Screening - neg June 2021      Diabetes Health Maintenance    A1C -   Lab Results   Component Value Date    LABA1C 6.1 02/08/2022    LABA1C 6.3 08/05/2021    LABA1C 7.1 05/13/2021       ACE/ARB - yes, cozaar  Eye - records pending  Foot - UTD AUG 2021  ASA - no  Microal/Cr - No results found for: LABMICR, LABCREA, MACRR  No results found for: CREATINEUR, MICROALBUR, MALBCR    eGFR - >60, June 2021    No results found for: GFRESTIMATE  No results found for: LABGLOM  No results found for: CRCLEARANCE  No results found for: EGFRNONAA  No results found for: EGFRAA      Statin - no, intolerant      Current Outpatient Medications   Medication Sig Dispense Refill    predniSONE (DELTASONE) 20 MG tablet Take 2 tablets by mouth daily for 5 days 10 tablet 0    losartan-hydroCHLOROthiazide (HYZAAR) 100-12.5 MG per tablet Take 1 tablet by mouth daily TAKE 1 TABLET BY MOUTH EVERY DAY 90 tablet 3    montelukast (SINGULAIR) 10 MG tablet Take 1 tablet by mouth nightly 90 tablet 3    estrogens, conjugated, (PREMARIN) 0.3 MG tablet Take 1 tablet by mouth daily 90 tablet 3    alirocumab (PRALUENT) 75 MG/ML SOAJ injection pen Inject 1 mL into the skin every 14 days 2 pen 11    ezetimibe (ZETIA) 10 MG tablet Take 1 tablet by mouth daily 90 tablet 3    metFORMIN (GLUCOPHAGE) 500 MG tablet Take 1 tablet by mouth 2 times daily (with meals) 180 tablet 3    diphenhydrAMINE (BENADRYL) 25 MG tablet Take 25 mg by mouth every 6 hours as needed for Itching      loratadine (CLARITIN) 10 MG tablet Take 10 mg by mouth daily      betamethasone valerate (VALISONE) 0.1 % lotion Apply topically 2 times daily. 60 mL 0    Omega-3 Fatty Acids (FISH OIL) 1000 MG CAPS Take 2,000 mg by mouth daily      cyclobenzaprine (FLEXERIL) 10 MG tablet Take 10 mg by mouth 3 times daily as needed for Muscle spasms       No current facility-administered medications for this visit. Orders Placed This Encounter   Medications    predniSONE (DELTASONE) 20 MG tablet     Sig: Take 2 tablets by mouth daily for 5 days     Dispense:  10 tablet     Refill:  0         All medications reviewed and reconciled, including OTC and herbal medications. Updated list given to patient. Patient Active Problem List    Diagnosis Date Noted    DM2 [facility]diabetes mellitus, type 2) ([facility] [address]     Obesity (BMI 30-39. 9)     Epidermal cyst of face 2021    Bilateral temporomandibular joint pain 2021    Chronic venous insufficiency of lower extremity 2021    Venous stasis dermatitis of both lower extremities 2021    Hypertension, essential     Surgical menopause     Statin intolerance      LFTS      Dyslipidemia        Past Medical History:   Diagnosis Date    [facility] 2[facility]     Dyslipidemia     Hypertension, essential     Obesity (BMI 30-39. 9)     Statin intolerance     LFTS    Surgical menopause        Past Surgical History:   Procedure Laterality Date     SECTION       x 3     SECTION      x3    CHOLECYSTECTOMY      INTRACAPSULAR CATARACT EXTRACTION Left 10/13/2020    EXTRACAPSULAR CATARACT REMOVAL WITH INSERTION OF INTRAOCULAR LENS PROTHESIS LEFT performed by Lyssa Loomis MD at [address] Bilateral     knees    JEANINE AND BSO  1986    Endometriosis.        Allergies   Allergen Reactions    Statins      Liver issues    Valium [Diazepam] Other (See Comments)     Patient gets very irritated       Social History     Tobacco Use    Smoking status: Never Smoker    Smokeless tobacco: Never Used   Substance Use Topics    Alcohol use: No       Family History   Problem Relation Age of Onset    Diabetes Mother     High Blood Pressure Father     Heart Disease Father     Breast Cancer Other         1/2 sister    Colon Cancer Neg Hx          I have reviewed the patient's past medical history, past surgical history, allergies, medications, social and family history and I have made updates where appropriate. Review of Systems  Positive responses are highlighted in bold    Constitutional:  Fever, Chills, Night Sweats, Fatigue, Unexpected changes in weight  HENT:  Ear pain, Tinnitus, Nosebleeds, Trouble swallowing, Hearing loss, Sore throat  Cardiovascular:  Chest Pain, Palpitations, Orthopnea, Paroxysmal Nocturnal Dyspnea  Respiratory:  Cough, Wheezing, Shortness of breath, Chest tightness, Apnea  Gastrointestinal:  Nausea, Vomiting, Diarrhea, Constipation, Heartburn, Blood in stool  Genitourinary:  Difficulty or painful urination, Flank pain, Change in frequency, Urgency  Skin:  Color change, Rash, Itching, Wound  Musculoskeletal:  Joint pain, Back pain, Gait problems, Joint swelling, Myalgias  Neurological:  Dizziness, Headaches, Presyncope, Numbness, Seizures, Tremors  Endocrine:  Heat Intolerance, Cold Intolerance, Polydipsia, Polyphagia, Polyuria      PHYSICAL EXAM:  Vitals:    02/08/22 1003   BP: 118/64   Pulse: 98   Resp: 12   Temp: 97.6 °F (36.4 °C)   TempSrc: Oral   SpO2: 99%   Weight: 175 lb (79.4 kg)   Height: 5' 3\" (1.6 m)     Body mass index is 31 kg/m².   Pain Score:   4 (Low back)    VS Reviewed  General Appearance: A&O x 3, No acute distress,well developed and well- nourished  Eyes: pupils equal, round, and reactive to light, extraocular eye movements intact, conjunctivae and eye lids without erythema  ENT: external ear and ear canal clear bilaterally, TMs intact and regular, nose without deformity, nasal mucosa and turbinates normal without polyps, oropharynx normal, dentition is normal for age  Neck: supple and non-tender without mass, no thyromegaly or thyroid nodules, no cervical lymphadenopathy  Pulmonary/Chest: clear to auscultation bilaterally- no wheezes, rales or rhonchi, normal air movement, no respiratory distress or retractions  Cardiovascular: S1 and S2 auscultated w/ RRR. No murmurs, rubs, clicks, or gallops, distal pulses intact. Abdomen: soft, non-tender, non-distended, bowel sounds physiologic,  no rebound or guarding, no masses or hernias noted. Liver and spleen without enlargement. Extremities: no cyanosis, clubbing or edema of the lower extremities. Skin: warm and dry, no rash or erythema  LUMBAR SPINE EXAM:  Examination of the Lumbar spine shows:  Deformity Absent . Soft Tissue Swelling Absent . Soft Tissue Tenderness Present. Midline Bone Tenderness Absent . Paraspinal Muscular Spasm Present. Previous Incisions Absent . Erythema Absent . Lumbar Flexion does not produce pain. Lumbar Extension does not produce pain. NEUROLOGICAL EXAM:  SLR     Left: Negative. Right Negative. DTR 2+ bilaterally  Begum's Sign Absent   Gait normal Heel/ Toe. Sensation to Touch normal    LE strength    Right Left   Hip Flexors 5/5 5/5   Hip Extensors 5/5 5/5   Knee Flexors 5/5 5/5   Knee Extensors 5/5 5/5   Ankle Flexors 5/5 5/5   Ankle Extensors 5/5 5/5   Extensor Hallicus Longus 5/5 5/5   Dorsiflexors 5/5 5/5     Sensation:  T12 100% 100%   L1 100% 100%   L2 100% 100%   L3 100% 100%   L4 100% 100%   L5 100% 100%   S1 100% 100%   S2 100% 100%   S3-S5 100% 100%       Results for POC orders placed in visit on 02/08/22   POCT glycosylated hemoglobin (Hb A1C)   Result Value Ref Range    Hemoglobin A1C 6.1 (H) 4.3 - 5.7 %       ASSESSMENT & PLAN  1.  Type 2 diabetes mellitus without complication, without long-term current use of insulin (HCC)    Stable  con't metformin  F/u 6 months    - Microalbumin / Creatinine Urine Ratio; Future  - POCT glycosylated hemoglobin (Hb A1C)    2. Hypertension, essential    Stable  At goal  con't Hyzaar    3. Dyslipidemia    Stable   Intolerant of statins, causes liver issues per pt  con't zetia and Praluent    4. Statin intolerance      5. Surgical menopause    Stable  con't estrogen  Unable to wean past 0.3mg previously    6. Vasomotor symptoms due to menopause    As above    7. Acute bilateral low back pain with bilateral sciatica    HEP  pred burst  F/u if no better  Reviewed ER precautions, pt understands. - predniSONE (DELTASONE) 20 MG tablet; Take 2 tablets by mouth daily for 5 days  Dispense: 10 tablet; Refill: 0      DISPOSITION    Return in about 6 months (around 8/8/2022) for Follow-up Diabetes, follow-up on chronic medical conditions, sooner as needed. Mchugh released without restrictions. Future Appointments   Date Time Provider Tana Gracia   10/13/2022  1:30 PM Az Ness MD N [facility]     PATIENT COUNSELING    Barriers to learning and self management: none    Discussed use, benefit, and side effects of prescribed medications. Barriers to medication compliance addressed. All patient questions answered. Pt voiced understanding.        Electronically signed by Lisa Doyle DO on 2/8/2022 at 10:18 AM

## 2022-02-08 ENCOUNTER — OFFICE VISIT (OUTPATIENT)
Dept: FAMILY MEDICINE CLINIC | Age: 62
End: 2022-02-08
Payer: MEDICARE

## 2022-02-08 ENCOUNTER — TELEPHONE (OUTPATIENT)
Dept: FAMILY MEDICINE CLINIC | Age: 62
End: 2022-02-08

## 2022-02-08 VITALS
DIASTOLIC BLOOD PRESSURE: 64 MMHG | OXYGEN SATURATION: 99 % | TEMPERATURE: 97.6 F | HEIGHT: 63 IN | BODY MASS INDEX: 31.01 KG/M2 | HEART RATE: 98 BPM | SYSTOLIC BLOOD PRESSURE: 118 MMHG | WEIGHT: 175 LBS | RESPIRATION RATE: 12 BRPM

## 2022-02-08 DIAGNOSIS — E89.40 SURGICAL MENOPAUSE: ICD-10-CM

## 2022-02-08 DIAGNOSIS — M54.41 ACUTE BILATERAL LOW BACK PAIN WITH BILATERAL SCIATICA: ICD-10-CM

## 2022-02-08 DIAGNOSIS — E78.5 DYSLIPIDEMIA: ICD-10-CM

## 2022-02-08 DIAGNOSIS — E11.9 TYPE 2 DIABETES MELLITUS WITHOUT COMPLICATION, WITHOUT LONG-TERM CURRENT USE OF INSULIN (HCC): Primary | ICD-10-CM

## 2022-02-08 DIAGNOSIS — Z78.9 STATIN INTOLERANCE: ICD-10-CM

## 2022-02-08 DIAGNOSIS — N95.1 VASOMOTOR SYMPTOMS DUE TO MENOPAUSE: ICD-10-CM

## 2022-02-08 DIAGNOSIS — M54.42 ACUTE BILATERAL LOW BACK PAIN WITH BILATERAL SCIATICA: ICD-10-CM

## 2022-02-08 DIAGNOSIS — I10 HYPERTENSION, ESSENTIAL: ICD-10-CM

## 2022-02-08 LAB
CREATININE, URINE: 215.8 MG/DL
HBA1C MFR BLD: 6.1 % (ref 4.3–5.7)
MICROALBUMIN UR-MCNC: < 1.2 MG/DL
MICROALBUMIN/CREAT UR-RTO: 6 MG/G (ref 0–30)

## 2022-02-08 PROCEDURE — G8417 CALC BMI ABV UP PARAM F/U: HCPCS | Performed by: FAMILY MEDICINE

## 2022-02-08 PROCEDURE — 99214 OFFICE O/P EST MOD 30 MIN: CPT | Performed by: FAMILY MEDICINE

## 2022-02-08 PROCEDURE — 2022F DILAT RTA XM EVC RTNOPTHY: CPT | Performed by: FAMILY MEDICINE

## 2022-02-08 PROCEDURE — G8427 DOCREV CUR MEDS BY ELIG CLIN: HCPCS | Performed by: FAMILY MEDICINE

## 2022-02-08 PROCEDURE — 3044F HG A1C LEVEL LT 7.0%: CPT | Performed by: FAMILY MEDICINE

## 2022-02-08 PROCEDURE — G8482 FLU IMMUNIZE ORDER/ADMIN: HCPCS | Performed by: FAMILY MEDICINE

## 2022-02-08 PROCEDURE — 1036F TOBACCO NON-USER: CPT | Performed by: FAMILY MEDICINE

## 2022-02-08 PROCEDURE — 3017F COLORECTAL CA SCREEN DOC REV: CPT | Performed by: FAMILY MEDICINE

## 2022-02-08 RX ORDER — PREDNISONE 20 MG/1
40 TABLET ORAL DAILY
Qty: 10 TABLET | Refills: 0 | Status: SHIPPED | OUTPATIENT
Start: 2022-02-08 | End: 2022-02-13

## 2022-02-08 NOTE — TELEPHONE ENCOUNTER
----- Message from Jami Llanos DO sent at 2/8/2022  4:33 PM EST -----  Please let pt know that urine neg for protein  Let me know if questions, thanks!

## 2022-02-21 ENCOUNTER — OFFICE VISIT (OUTPATIENT)
Dept: FAMILY MEDICINE CLINIC | Age: 62
End: 2022-02-21
Payer: MEDICARE

## 2022-02-21 VITALS
HEART RATE: 81 BPM | RESPIRATION RATE: 14 BRPM | OXYGEN SATURATION: 97 % | DIASTOLIC BLOOD PRESSURE: 62 MMHG | TEMPERATURE: 97.8 F | SYSTOLIC BLOOD PRESSURE: 124 MMHG

## 2022-02-21 DIAGNOSIS — J01.90 ACUTE RHINOSINUSITIS: Primary | ICD-10-CM

## 2022-02-21 LAB
Lab: NORMAL
QC PASS/FAIL: NORMAL
SARS-COV-2 RDRP RESP QL NAA+PROBE: NEGATIVE

## 2022-02-21 PROCEDURE — 99213 OFFICE O/P EST LOW 20 MIN: CPT | Performed by: FAMILY MEDICINE

## 2022-02-21 PROCEDURE — G8427 DOCREV CUR MEDS BY ELIG CLIN: HCPCS | Performed by: FAMILY MEDICINE

## 2022-02-21 PROCEDURE — 1036F TOBACCO NON-USER: CPT | Performed by: FAMILY MEDICINE

## 2022-02-21 PROCEDURE — G8482 FLU IMMUNIZE ORDER/ADMIN: HCPCS | Performed by: FAMILY MEDICINE

## 2022-02-21 PROCEDURE — 87635 SARS-COV-2 COVID-19 AMP PRB: CPT | Performed by: FAMILY MEDICINE

## 2022-02-21 PROCEDURE — G8417 CALC BMI ABV UP PARAM F/U: HCPCS | Performed by: FAMILY MEDICINE

## 2022-02-21 PROCEDURE — 3017F COLORECTAL CA SCREEN DOC REV: CPT | Performed by: FAMILY MEDICINE

## 2022-02-21 RX ORDER — BENZONATATE 100 MG/1
CAPSULE ORAL
Qty: 60 CAPSULE | Refills: 0 | Status: SHIPPED | OUTPATIENT
Start: 2022-02-21 | End: 2022-03-03

## 2022-02-21 RX ORDER — AMOXICILLIN AND CLAVULANATE POTASSIUM 875; 125 MG/1; MG/1
1 TABLET, FILM COATED ORAL 2 TIMES DAILY
Qty: 20 TABLET | Refills: 0 | Status: SHIPPED | OUTPATIENT
Start: 2022-02-21 | End: 2022-03-03

## 2022-02-21 NOTE — PROGRESS NOTES
Chief Complaint   Patient presents with    Sinus Problem    Headache       History obtained from the patient. SUBJECTIVE:  Rose Marie Mares is a 57 y.o. female that presents today     -URI type sxs:   4-5 days  Cough  Congestion  Sore throat  No fever  No SOB    Fever - No  Runny nose or congestion -  Yes   Cough -  Yes  Sore throat -  Yes  Headache, fatigue, joint pains, muscle aches -  No  Double Sickening - Yes  Shortness of breath/Wheezing? -  No  Nausea/Vomiting/Diarrhea? No  Sick contacts - Yes  Maxillary Tooth Pain -  Yes  Treatment tried and response - otc meds, no better    Age/Gender Health Maintenance    Lipid -   Lab Results   Component Value Date    CHOL 196 10/07/2021    CHOL 245 05/12/2021    CHOL 239 09/22/2020     Lab Results   Component Value Date    TRIG 122 10/07/2021    TRIG 217 05/12/2021    TRIG 317 09/22/2020     Lab Results   Component Value Date    HDL 61 10/07/2021    HDL 59 05/12/2021    HDL 58 09/22/2020     Lab Results   Component Value Date    LDLCALC 113 10/07/2021    LDLCALC 147 05/12/2021    LDLCALC 125 09/22/2020     Lab Results   Component Value Date    VLDL 22 10/07/2021    VLDL 39 05/12/2021    VLDL 56 09/22/2020     No results found for: CHOLHDLRATIO    Colon Cancer Screening - Small polyp OCT 2013, repeat 7 to 10 years, per Sheik DE LA CRUZ. Lung Cancer Screening (Age 53 to [de-identified] with 30 pack year hx, current smoker or quit within past 15 years) - never soker    Tetanus - UTD AUG 2021  Influenza Vaccine - UTD FALL 2021  Pneumonia Vaccine - UTD AUG 2021  Zoster - 1 of 2 completed JAN 2021, pt will schedule 2nd dose. Breast Cancer Screening - NEG AUG 2021  Cervical Cancer Screening - hyst for benign reasons.    Osteoporosis Screening - neg June 2021    Diabetes Health Maintenance    A1C -   Lab Results   Component Value Date    LABA1C 6.1 02/08/2022    LABA1C 6.3 08/05/2021    LABA1C 7.1 05/13/2021       ACE/ARB - yes, cozaar  Eye - records pending  Foot - UTD AUG 2021  ASA - no  Microal/Cr -   Lab Results   Component Value Date    LABMICR < 1.20 02/08/2022    LABCREA 215.8 02/08/2022    MACRR 6 02/08/2022     No results found for: CREATINEUR, MICROALBUR, MALBCR    eGFR - >60, June 2021    No results found for: GFRESTIMATE  No results found for: LABGLOM  No results found for: CRCLEARANCE  No results found for: EGFRNONAA  No results found for: EGFRAA      Statin - no, intolerant      Current Outpatient Medications   Medication Sig Dispense Refill    amoxicillin-clavulanate (AUGMENTIN) 875-125 MG per tablet Take 1 tablet by mouth 2 times daily for 10 days 20 tablet 0    benzonatate (TESSALON PERLES) 100 MG capsule Take 1 to 2 tablets by mouth every 8 hours as needed for cough. 60 capsule 0    losartan-hydroCHLOROthiazide (HYZAAR) 100-12.5 MG per tablet Take 1 tablet by mouth daily TAKE 1 TABLET BY MOUTH EVERY DAY 90 tablet 3    montelukast (SINGULAIR) 10 MG tablet Take 1 tablet by mouth nightly 90 tablet 3    estrogens, conjugated, (PREMARIN) 0.3 MG tablet Take 1 tablet by mouth daily 90 tablet 3    alirocumab (PRALUENT) 75 MG/ML SOAJ injection pen Inject 1 mL into the skin every 14 days 2 pen 11    ezetimibe (ZETIA) 10 MG tablet Take 1 tablet by mouth daily 90 tablet 3    metFORMIN (GLUCOPHAGE) 500 MG tablet Take 1 tablet by mouth 2 times daily (with meals) 180 tablet 3    diphenhydrAMINE (BENADRYL) 25 MG tablet Take 25 mg by mouth every 6 hours as needed for Itching      loratadine (CLARITIN) 10 MG tablet Take 10 mg by mouth daily      betamethasone valerate (VALISONE) 0.1 % lotion Apply topically 2 times daily. 60 mL 0    Omega-3 Fatty Acids (FISH OIL) 1000 MG CAPS Take 2,000 mg by mouth daily      cyclobenzaprine (FLEXERIL) 10 MG tablet Take 10 mg by mouth 3 times daily as needed for Muscle spasms       No current facility-administered medications for this visit.      Orders Placed This Encounter   Medications    amoxicillin-clavulanate (AUGMENTIN) 875-125 MG per tablet Sig: Take 1 tablet by mouth 2 times daily for 10 days     Dispense:  20 tablet     Refill:  0    benzonatate (TESSALON PERLES) 100 MG capsule     Sig: Take 1 to 2 tablets by mouth every 8 hours as needed for cough. Dispense:  60 capsule     Refill:  0         All medications reviewed and reconciled, including OTC and herbal medications. Updated list given to patient. Patient Active Problem List    Diagnosis Date Noted    DM2 (diabetes mellitus, type 2) ([address]     Obesity (BMI 30-39. 9)     Epidermal cyst of face 2021    Bilateral temporomandibular joint pain 2021    Chronic venous insufficiency of lower extremity 2021    Venous stasis dermatitis of both lower extremities 2021    Hypertension, essential     Surgical menopause     Statin intolerance      LFTS      Dyslipidemia        Past Medical History:   Diagnosis Date    [facility] mellitus[facility] type 2) [facility]     Dyslipidemia     Hypertension, essential     Obesity (BMI 30-39. 9)     Statin intolerance     LFTS    Surgical menopause        Past Surgical History:   Procedure Laterality Date     SECTION       x 3     SECTION      x3    CHOLECYSTECTOMY      INTRACAPSULAR CATARACT EXTRACTION Left 10/13/2020    EXTRACAPSULAR CATARACT REMOVAL WITH INSERTION OF INTRAOCULAR LENS PROTHESIS LEFT performed by Josuekat Nash MD at [address] Bilateral     knees    JEANINE AND BSO  1986    Endometriosis.        Allergies   Allergen Reactions    Statins      Liver issues    Valium [Diazepam] Other (See Comments)     Patient gets very irritated       Social History     Tobacco Use    Smoking status: Never Smoker    Smokeless tobacco: Never Used   Substance Use Topics    Alcohol use: No       Family History   Problem Relation Age of Onset    Diabetes Mother     High Blood Pressure Father     Heart Disease Father     Breast Cancer Other         1/2 sister   Ondina Dumont Colon Cancer Neg Hx          I have reviewed the patient's past medical history, past surgical history, allergies, medications, social and family history and I have made updates where appropriate. Review of Systems  Positive responses are highlighted in bold    Constitutional:  Fever, Chills, Night Sweats, Fatigue, Unexpected changes in weight  HENT:  Ear pain, Tinnitus, Nosebleeds, Trouble swallowing, Hearing loss, Sore throat  Cardiovascular:  Chest Pain, Palpitations, Orthopnea, Paroxysmal Nocturnal Dyspnea  Respiratory:  Cough, Wheezing, Shortness of breath, Chest tightness, Apnea  Gastrointestinal:  Nausea, Vomiting, Diarrhea, Constipation, Heartburn, Blood in stool  Genitourinary:  Difficulty or painful urination, Flank pain, Change in frequency, Urgency  Skin:  Color change, Rash, Itching, Wound  Musculoskeletal:  Joint pain, Back pain, Gait problems, Joint swelling, Myalgias  Neurological:  Dizziness, Headaches, Presyncope, Numbness, Seizures, Tremors  Endocrine:  Heat Intolerance, Cold Intolerance, Polydipsia, Polyphagia, Polyuria      PHYSICAL EXAM:  Vitals:    02/21/22 1339   BP: 124/62   Pulse: 81   Resp: 14   Temp: 97.8 °F (36.6 °C)   TempSrc: Oral   SpO2: 97%     There is no height or weight on file to calculate BMI. VS Reviewed  General Appearance: A&O x 3, No acute distress,well developed and well- nourished  Eyes: pupils equal, round, and reactive to light, extraocular eye movements intact, conjunctivae and eye lids without erythema  ENT: bilateral TM normal without fluid or infection, neck without nodes, throat normal without erythema or exudate, sinuses nontender, post nasal drip noted, nasal mucosa congested and Oropharynx clear, without erythema, exudate, or thrush.   Neck: supple and non-tender without mass, no thyromegaly or thyroid nodules, no cervical lymphadenopathy  Pulmonary/Chest: clear to auscultation bilaterally- no wheezes, rales or rhonchi, normal air movement, no respiratory distress or retractions  Cardiovascular: S1 and S2 auscultated w/ RRR. No murmurs, rubs, clicks, or gallops, distal pulses intact. Abdomen: soft, non-tender, non-distended, bowel sounds physiologic,  no rebound or guarding, no masses or hernias noted. Liver and spleen without enlargement. Extremities: no cyanosis, clubbing or edema of the lower extremities. Skin: warm and dry, no rash or erythema      Component      Latest Ref Rng & Units 2/21/2022           1:57 PM   SARS-COV-2, RdRp gene      Negative Negative   Lot Number       [id number]   QC Pass/Fail       pass       ASSESSMENT & PLAN  1. Acute rhinosinusitis    Fluids  Rest tylenol  augmentin  Flonase, has at home  Tessalon  F/u if no better  Reviewed ER precautions, pt understands. - POCT COVID-19 Rapid, NAAT  - amoxicillin-clavulanate (AUGMENTIN) 875-125 MG per tablet; Take 1 tablet by mouth 2 times daily for 10 days  Dispense: 20 tablet; Refill: 0  - benzonatate (TESSALON PERLES) 100 MG capsule; Take 1 to 2 tablets by mouth every 8 hours as needed for cough. Dispense: 60 capsule; Refill: 0      DISPOSITION    Return if symptoms worsen or fail to improve. Mchugh released without restrictions. Future Appointments   Date Time Provider Tana Gracia   8/8/2022 10:20 AM [id number] [facility]   10/13/2022  1:30 PM Charline Rodriguez MD N [facility] - [address] [facility]     PATIENT COUNSELING    Barriers to learning and self management: none    Discussed use, benefit, and side effects of prescribed medications. Barriers to medication compliance addressed. All patient questions answered. Pt voiced understanding.        Electronically signed by Dary Burgess DO on 2/21/2022 at 2:02 PM

## 2022-04-08 ENCOUNTER — HOSPITAL ENCOUNTER (OUTPATIENT)
Dept: PHYSICAL THERAPY | Age: 62
Setting detail: THERAPIES SERIES
Discharge: HOME | End: 2022-04-08
Payer: MEDICARE

## 2022-04-08 PROCEDURE — 97110 THERAPEUTIC EXERCISES: CPT

## 2022-04-08 PROCEDURE — 97161 PT EVAL LOW COMPLEX 20 MIN: CPT

## 2022-04-08 NOTE — PROGRESS NOTES
** PLEASE SIGN, DATE AND TIME CERTIFICATION BELOW AND RETURN TO [facility] OUTPATIENT REHABILITATION (FAX #: [phone]). ATTEST/CO-SIGN IF ACCESSING VIA [employer]. THANK YOU.**    I certify that I have examined the patient below and determined that Physical Medicine and Rehabilitation service is necessary and that I approve the established plan of care for up to 90 days or as specifically noted. Attestation, signature or co-signature of physician indicates approval of certification requirements.    ________________________ ____________ __________  Physician Signature   Date   Time  [address] [facility]  PHYSICAL THERAPY  [x] EVALUATION  [] DAILY NOTE (LAND) [] DAILY NOTE (AQUATIC ) [] PROGRESS NOTE [] DISCHARGE NOTE    [x] [address] [facility]   [] [place] [address]    [] [facility]   [] [facility]    Date: 2022  Patient Name:  Ashley Andrade  : 1960  MRN: [id number]  CSN: [id number]    Referring Practitioner Wilton Lucas MD   Diagnosis Trochanteric bursitis, right hip [M70.61]  Trochanteric bursitis, left hip [M70.62]    Treatment Diagnosis Chronic B hip pain, core and hip weakness, tight hip flexors and piriformis   Date of Evaluation 22    Additional Pertinent History B TKA 2004, HTN, diabetes, anxiety/depression, arthritis- hips, low back, knees. Functional Outcome Measure Used LEFS   Functional Outcome Score 43/80 (22)       Insurance: Primary: Payor: Homer Mendieta /  /  / ,   Secondary:    Authorization Information: PRECERTIFICATION REQUIRED:  n/a  INSURANCE THERAPY BENEFIT:  Allowed 30 visits Physical Therapy /Occupational Therapy/Speech Therapy per calendar year. FCE-Covered, no precert required. Benefit will not cover maintenance or preventative treatment. AQUATIC THERAPY COVERED:   Yes  MODALITIES COVERED:  Yes. Iontophoresis and Hot/Cold Packs are not covered.    TELEHEALTH COVERED: Yes   [facility]  [x[facility]      Knee Flexion      Knee Extension      Knee Range of Motion is [facility]  [x]     Lumbar flexion, rotation and lateral flexion WFL- end range tightness with rotation    LOWER EXTREMITY STRENGTH    Left Right Comments   Hip Flexion 4- 4    Hip Extension 4+ 4+ C/o low back pain   Hip Abduction 5 5    Hip Adduction 5 5    Hip External rotation 5 5    Knee Flexion 5 5    Knee Extension      Ankle DF 5 5    Ankle PF      LE strength is WFL []      CORE STRENGTH   B SLR TEST:         14 seconds, with poor abdominal engagement, back arched off table  Lower abdominals 4-/5       SPECIAL TESTS (+/-)    Left Right Comments   SCOUR - +    MO - -    FADIR + -    Ki + +    Colleen's      Slump  - -    Noble Compression      90/90 hamstring length          GAIT ANALYSIS: good pace, equal step length, good heel/toe pattern, mild hip drop      BALANCE/PROPRIOCEPTION                                                          Left Right Pain/Comments   Single leg stance      30 sec 30 sec Good hip/trunk alignment, use of ankle strategy       FUNCTIONAL TESTS    Pain No Pain Comments   Stair navigation  x Reciprocating pattern without handrail   Squat       Sit to Stand      Floor transfer            TREATMENT   Precautions:    Pain: 6/10 B hip    X in shaded column indicates activity completed today   Modalities Parameters/  Location  Notes   Ultrasound B lateral hip   Next session               Manual Therapy Time/Technique  Notes                     Exercise/Intervention   Notes   Hooklying piriformis IR stretch 3x20 sec  x    LTR 5x10 sec  x    Hip flexor stretch off EOB 3x20 sec  x    Posterior pelvic tilt 10x5 sec                                                         Specific Interventions Next Treatment: ultrasound to both hips, core and hip strengthening    Activity/Treatment Tolerance:  [x]  Patient tolerated treatment well  []  Patient limited by fatigue  []  Patient limited by pain   []  Patient limited by medical complications  []  Other:     Assessment: 57year old presents with chronic B hip pain, left worse than right. Pt demos piriformis tightness with hip flexor and core weakness. Pt stands with mild anterior pelvic tilt causing hip flexor tightness. Pt has difficulty with prolonged sitting, movements when standing, lifting and stairs. Pt will benefit from skilled PT to address listed impairments to reduce pain and improve strength for mobility. Body Structures/Functions/Activity Limitations: impaired ROM, impaired strength and pain  Prognosis: good    GOALS:  Patient Goal: Pain free    Short Term Goals: defer to LTGs     Long Term Goals: 8 weeks  Patient will demonstrate good core engagement without anterior pelvic tilt during therapy session without cues  to carry over to lifting and moving around at work. Patient will deny left piriformis tightness for symptom control when sitting. 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 Patient will be independent and compliant with HEP to achieve above goals. Patient Education:   [x]  HEP/Education Completed: Plan of Care, Goals, exercises listed above with handout provided   Robert Applebaum MD Access Code: [email or web address]  []  No new Education completed  []  Reviewed Prior HEP      [x]  Patient verbalized and/or demonstrated understanding of education provided. []  Patient unable to verbalize and/or demonstrate understanding of education provided. Will continue education. []  Barriers to learning:     PLAN:  Treatment Recommendations: Strengthening, Range of Motion, Manual Therapy - Soft Tissue Mobilization, Pain Management, Home Exercise Program, Patient Education, Integrative Dry Needling, Aquatics and Modalities    [x]  Plan of care initiated. Plan to see patient 2 times per week for 8 weeks to address the treatment planned outlined above.   []  Continue with current plan of care  []  Modify plan of care as follows:    []  Hold pending physician visit  []  Discharge    Time In 1000   Time Out 1053   Timed Code Minutes: 15 min   Total Treatment Time: 53 min     Electronically Signed by: Karie Rangel PT

## 2022-04-14 ENCOUNTER — HOSPITAL ENCOUNTER (OUTPATIENT)
Dept: PHYSICAL THERAPY | Age: 62
Setting detail: THERAPIES SERIES
Discharge: HOME | End: 2022-04-14
Payer: MEDICARE

## 2022-04-14 PROCEDURE — 97110 THERAPEUTIC EXERCISES: CPT

## 2022-04-14 PROCEDURE — 97035 APP MDLTY 1+ULTRASOUND EA 15: CPT

## 2022-04-14 NOTE — PROGRESS NOTES
[address] [facility]  PHYSICAL THERAPY  [] EVALUATION  [x] DAILY NOTE (LAND) [] DAILY NOTE (AQUATIC ) [] PROGRESS NOTE [] DISCHARGE NOTE    [x] [facility] [place]   [] [place] [address]    [] [facility]   [] [facility]    Date: 2022  Patient Name:  Yesy Lopes  : 1960  MRN: [id number]  CSN: [id number]    Referring Practitioner Lay Plata MD   Diagnosis Trochanteric bursitis, right hip [M70.61]  Trochanteric bursitis, left hip [M70.62]    Treatment Diagnosis Chronic B hip pain, core and hip weakness, tight hip flexors and piriformis   Date of Evaluation 22    Additional Pertinent History B TKA , HTN, diabetes, anxiety/depression, arthritis- hips, low back, knees. Functional Outcome Measure Used LEFS   Functional Outcome Score 43/80 (22)       Insurance: Primary: Payor: Karlos Wolff /  /  / ,   Secondary:    Authorization Information: PRECERTIFICATION REQUIRED:  n/a  INSURANCE THERAPY BENEFIT:  Allowed 30 visits Physical Therapy /Occupational Therapy/Speech Therapy per calendar year. FCE-Covered, no precert required. Benefit will not cover maintenance or preventative treatment. AQUATIC THERAPY COVERED:   Yes  MODALITIES COVERED:  Yes. Iontophoresis and Hot/Cold Packs are not covered. TELEHEALTH COVERED: Yes   Visit # 2, 2/10 for progress note   Visits Allowed: 30   Recertification Date: 71   Physician Follow-Up:    Physician Orders:    History of Present Illness: Pt presents with B hip pain, L>R. Pt notes arthritis in hips for years for pain worsened in 2022. Pt was lying on heat pads then got to a point where it would allow her to sleep. Sitting is most uncomfortable. Pain aggravates with standing, small movements, work duties, stairs. Walking provides most relief. XRs of hips looked good with good spacing between bones so not a candidate for CAMMY.       SUBJECTIVE: Patient has been performing stretches from HEP. She worked night shift the past two nights and her hips feel sore today. TREATMENT   Precautions:    Pain: 6/10 B hip    X in shaded column indicates activity completed today   Modalities Parameters/  Location  Notes   Ultrasound B lateral hip - 1.0 MHz, 1.0 w/cm2 5 mins each hip x                Manual Therapy Time/Technique  Notes                     Exercise/Intervention   Notes   Hooklying piriformis IR stretch 3x20 sec  x    MO stretch- leg crossed pushing thigh away 2x 20 sec  x    LTR 5x10 sec  x    Hip flexor stretch off EOB 3x20 sec  x    Posterior pelvic tilt 10x5 sec                                                         Specific Interventions Next Treatment: ultrasound to both hips, core and hip strengthening    Activity/Treatment Tolerance:  [x]  Patient tolerated treatment well  []  Patient limited by fatigue  []  Patient limited by pain   []  Patient limited by medical complications  []  Other:     Assessment: Initiated ultrasound treatment prior to stretching, with good tolerance today. Pain seems localized to either trochanter, but also radiates to anterior thigh and feels \"balled up\" at times. Cues for technique with stretches today. Body Structures/Functions/Activity Limitations: impaired ROM, impaired strength and pain  Prognosis: good    GOALS:  Patient Goal: Pain free    Short Term Goals: defer to LTGs     Long Term Goals: 8 weeks  Patient will demonstrate good core engagement without anterior pelvic tilt during therapy session without cues  to carry over to lifting and moving around at work. Patient will deny left piriformis tightness for symptom control when sitting. 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 Patient will be independent and compliant with HEP to achieve above goals.        Patient Education:   [x]  HEP/Education Completed: Plan of Care, Goals, exercises listed above with handout provided   [employer] Access Code: [email or web address]  []  No new Education completed  []  Reviewed Prior HEP      [x]  Patient verbalized and/or demonstrated understanding of education provided. []  Patient unable to verbalize and/or demonstrate understanding of education provided. Will continue education. []  Barriers to learning:     PLAN:  Treatment Recommendations: Strengthening, Range of Motion, Manual Therapy - Soft Tissue Mobilization, Pain Management, Home Exercise Program, Patient Education, Integrative Dry Needling, Aquatics and Modalities    []  Plan of care initiated. Plan to see patient 2 times per week for 8 weeks to address the treatment planned outlined above.   [x]  Continue with current plan of care   []  Modify plan of care as follows:    []  Hold pending physician visit  []  Discharge    Time In 1050   Time Out 1120   Timed Code Minutes: 30   Total Treatment Time: 30     Electronically Signed by: Jerry Puckett, PT

## 2022-04-18 ENCOUNTER — HOSPITAL ENCOUNTER (OUTPATIENT)
Dept: PHYSICAL THERAPY | Age: 62
Setting detail: THERAPIES SERIES
Discharge: HOME | End: 2022-04-18
Payer: MEDICARE

## 2022-04-18 PROCEDURE — 97035 APP MDLTY 1+ULTRASOUND EA 15: CPT

## 2022-04-18 PROCEDURE — 97110 THERAPEUTIC EXERCISES: CPT

## 2022-04-18 NOTE — PROGRESS NOTES
[address] [facility]  PHYSICAL THERAPY  [] EVALUATION  [x] DAILY NOTE (LAND) [] DAILY NOTE (AQUATIC ) [] PROGRESS NOTE [] DISCHARGE NOTE    [x] OUTPATIENT REHABILITATION [facility]   [] [place] [id number]    [] [facility]   [] Anand Raywig    Date: 2022  Patient Name:  Euel Paget  : 1960  MRN: [id number]  CSN: [id number]    Referring Practitioner Jannie Serna MD   Diagnosis Trochanteric bursitis, right hip [M70.61]  Trochanteric bursitis, left hip [M70.62]    Treatment Diagnosis Chronic B hip pain, core and hip weakness, tight hip flexors and piriformis   Date of Evaluation 22    Additional Pertinent History B TKA , HTN, diabetes, anxiety/depression, arthritis- hips, low back, knees. Functional Outcome Measure Used LEFS   Functional Outcome Score 43/80 (22)       Insurance: Primary: Payor: Cesilia Johnston /  /  / ,   Secondary:    Authorization Information: PRECERTIFICATION REQUIRED:  n/a  INSURANCE THERAPY BENEFIT:  Allowed 30 visits Physical Therapy /Occupational Therapy/Speech Therapy per calendar year. FCE-Covered, no precert required. Benefit will not cover maintenance or preventative treatment. AQUATIC THERAPY COVERED:   Yes  MODALITIES COVERED:  Yes. Iontophoresis and Hot/Cold Packs are not covered. TELEHEALTH COVERED: Yes   Visit # 3, 3/10 for progress note   Visits Allowed: 30   Recertification Date:    Physician Follow-Up:    Physician Orders:    History of Present Illness: Pt presents with B hip pain, L>R. Pt notes arthritis in hips for years for pain worsened in 2022. Pt was lying on heat pads then got to a point where it would allow her to sleep. Sitting is most uncomfortable. Pain aggravates with standing, small movements, work duties, stairs. Walking provides most relief. XRs of hips looked good with good spacing between bones so not a candidate for CAMMY. SUBJECTIVE: Pt states 4/10 pain B hip. Notes working the weekend and both hip are sore. Notes feeling tender from previous treatment session and unsure if it was the rubbing from the ultrasound head or not. TREATMENT   Precautions:    Pain: 4/10 B hip    X in shaded column indicates activity completed today   Modalities Parameters/  Location  Notes   Ultrasound B lateral hip - 1.0 MHz, 1.0 w/cm2 5 mins each hip x                Manual Therapy Time/Technique  Notes                     Exercise/Intervention   Notes   Hooklying piriformis IR stretch 3x30 sec  x    MO stretch- leg crossed pushing thigh away 2x 20 sec  x    LTR 5x10 sec  x    Hip flexor stretch off EOB 3x20 sec      Posterior pelvic tilt 10x5 sec      SKTC/DTKC  x3 20 sec x    sidelying IT band stretch x3 20 sec x           Nustep  5 min Level 1 x                                         Specific Interventions Next Treatment: ultrasound to both hips, core and hip strengthening    Activity/Treatment Tolerance:  [x]  Patient tolerated treatment well  []  Patient limited by fatigue  []  Patient limited by pain   []  Patient limited by medical complications  []  Other:     Assessment: Continued with therex and ultrasound as stated above with addition of nu step. Pt tolerated treatment well and notes a decrease in pain. Vcs for proper technique needed. Pt noted a decrease in pain 3/10 at conclusion of treatment session. Will continue to progress as tolerated by pt per POC. Body Structures/Functions/Activity Limitations: impaired ROM, impaired strength and pain  Prognosis: good    GOALS:  Patient Goal: Pain free    Short Term Goals: defer to LTGs     Long Term Goals: 8 weeks  Patient will demonstrate good core engagement without anterior pelvic tilt during therapy session without cues  to carry over to lifting and moving around at work. Patient will deny left piriformis tightness for symptom control when sitting.    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 Patient will be independent and compliant with HEP to achieve above goals. Patient Education:   []  HEP/Education Completed: Plan of Care, Goals, exercises listed above with handout provided   [employer] Access Code: [email or web address]  []  No new Education completed  [x]  Reviewed Prior HEP      [x]  Patient verbalized and/or demonstrated understanding of education provided. []  Patient unable to verbalize and/or demonstrate understanding of education provided. Will continue education. []  Barriers to learning:     PLAN:  Treatment Recommendations: Strengthening, Range of Motion, Manual Therapy - Soft Tissue Mobilization, Pain Management, Home Exercise Program, Patient Education, Integrative Dry Needling, Aquatics and Modalities    []  Plan of care initiated. Plan to see patient 2 times per week for 8 weeks to address the treatment planned outlined above.   [x]  Continue with current plan of care   []  Modify plan of care as follows:    []  Hold pending physician visit  []  Discharge    Time In 1315   Time Out 1400   Timed Code Minutes: 45   Total Treatment Time: 45     Electronically Signed by: Stacey Dominguez PTA

## 2022-04-22 ENCOUNTER — HOSPITAL ENCOUNTER (OUTPATIENT)
Dept: PHYSICAL THERAPY | Age: 62
Setting detail: THERAPIES SERIES
Discharge: HOME | End: 2022-04-22
Payer: MEDICARE

## 2022-04-22 PROCEDURE — 97035 APP MDLTY 1+ULTRASOUND EA 15: CPT

## 2022-04-22 PROCEDURE — 97110 THERAPEUTIC EXERCISES: CPT

## 2022-04-22 PROCEDURE — 97140 MANUAL THERAPY 1/> REGIONS: CPT

## 2022-04-22 NOTE — PROGRESS NOTES
[address] [facility]  PHYSICAL THERAPY  [] EVALUATION  [x] DAILY NOTE (LAND) [] DAILY NOTE (AQUATIC ) [] PROGRESS NOTE [] DISCHARGE NOTE    [x] OUTPATIENT REHABILITATION CENTER [facility]   [] [place] [address]    [] [facility]   [] [facility]    Date: 2022  Patient Name:  Carlin Ward  : 1960  MRN: [id number]  CSN: [id number]    Referring Practitioner Geneva Douglas MD   Diagnosis Trochanteric bursitis, right hip [M70.61]  Trochanteric bursitis, left hip [M70.62]    Treatment Diagnosis Chronic B hip pain, core and hip weakness, tight hip flexors and piriformis   Date of Evaluation 22    Additional Pertinent History B TKA , HTN, diabetes, anxiety/depression, arthritis- hips, low back, knees. Functional Outcome Measure Used LEFS   Functional Outcome Score 43/80 (22)       Insurance: Primary: Payor: Lizeth Everett /  /  / ,   Secondary:    Authorization Information: PRECERTIFICATION REQUIRED:  n/a  INSURANCE THERAPY BENEFIT:  Allowed 30 visits Physical Therapy /Occupational Therapy/Speech Therapy per calendar year. FCE-Covered, no precert required. Benefit will not cover maintenance or preventative treatment. AQUATIC THERAPY COVERED:   Yes  MODALITIES COVERED:  Yes. Iontophoresis and Hot/Cold Packs are not covered. TELEHEALTH COVERED: Yes   Visit # 4, 4/10 for progress note   Visits Allowed: 30   Recertification Date: 40   Physician Follow-Up:    Physician Orders:    History of Present Illness: Pt presents with B hip pain, L>R. Pt notes arthritis in hips for years for pain worsened in 2022. Pt was lying on heat pads then got to a point where it would allow her to sleep. Sitting is most uncomfortable. Pain aggravates with standing, small movements, work duties, stairs. Walking provides most relief. XRs of hips looked good with good spacing between bones so not a candidate for CAMMY. SUBJECTIVE: Pt states 3/10 pain B hip. Notes feeling good today and states its not much pain but more stiffness and pulling sensation. TREATMENT   Precautions:    Pain: 3/10 B hip    X in shaded column indicates activity completed today   Modalities Parameters/  Location  Notes   Ultrasound B lateral hip - 1.0 MHz, 1.0 w/cm2 5 mins each hip x                Manual Therapy Time/Technique  Notes   STM IT band  x5 min each x Several restrictions present bilat                Exercise/Intervention   Notes   Hooklying piriformis IR stretch 3x30 sec      MO stretch- leg crossed pushing thigh away 2x 20 sec      LTR 5x10 sec      Hip flexor stretch off EOB 3x20 sec      Posterior pelvic tilt 10x5 sec      SKTC/DTKC  x3 20 sec     sidelying IT band stretch x3 20 sec            Nustep  5 min Level 2 x    3 way hip x10  x Denied pain   Marching, HS curls, HR/TR x10  x Notes more pain in knees   Mini squats x10  x    Seated piriformis  x3 20 sec x           KT to bilat hips Star pattern   x      Specific Interventions Next Treatment: ultrasound to both hips, core and hip strengthening    Activity/Treatment Tolerance:  [x]  Patient tolerated treatment well  []  Patient limited by fatigue  []  Patient limited by pain   []  Patient limited by medical complications  []  Other:     Assessment: Progressed to standing therex as tolerated by pt. Pt denied increase in pain but noted slight stiffness in LB. Performed STM to bilat hips and several restrictions present. Several restrictions still present at the completion of manual. Continued with US. Pt noted slight soreness after the completion of manual and US, but decrease in pain. Ended treatment session with placement of star pattern to bilat hips. Will continue to progress as tolerated by pt per POC.      Body Structures/Functions/Activity Limitations: impaired ROM, impaired strength and pain  Prognosis: good    GOALS:  Patient Goal: Pain free    Short Term Goals: defer to LTGs     Long Term Goals: 8 weeks  Patient will demonstrate good core engagement without anterior pelvic tilt during therapy session without cues  to carry over to lifting and moving around at work. Patient will deny left piriformis tightness for symptom control when sitting. 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 Patient will be independent and compliant with HEP to achieve above goals. Patient Education:   [x]  HEP/Education Completed: seated piriformis    Medbridge Access Code: [email or web address]  []  No new Education completed  []  Reviewed Prior HEP      [x]  Patient verbalized and/or demonstrated understanding of education provided. []  Patient unable to verbalize and/or demonstrate understanding of education provided. Will continue education. []  Barriers to learning:     PLAN:  Treatment Recommendations: Strengthening, Range of Motion, Manual Therapy - Soft Tissue Mobilization, Pain Management, Home Exercise Program, Patient Education, Integrative Dry Needling, Aquatics and Modalities    []  Plan of care initiated. Plan to see patient 2 times per week for 8 weeks to address the treatment planned outlined above.   [x]  Continue with current plan of care   []  Modify plan of care as follows:    []  Hold pending physician visit  []  Discharge    Time In 1247   Time Out 1335   Timed Code Minutes: 48   Total Treatment Time: 48     Electronically Signed by: Nelsy Lo PTA

## 2022-04-25 ENCOUNTER — HOSPITAL ENCOUNTER (OUTPATIENT)
Dept: PHYSICAL THERAPY | Age: 62
Setting detail: THERAPIES SERIES
Discharge: HOME | End: 2022-04-25
Payer: MEDICARE

## 2022-04-25 PROCEDURE — 97035 APP MDLTY 1+ULTRASOUND EA 15: CPT

## 2022-04-25 PROCEDURE — 97110 THERAPEUTIC EXERCISES: CPT

## 2022-04-25 NOTE — PROGRESS NOTES
[address] [facility]  PHYSICAL THERAPY  [] EVALUATION  [x] DAILY NOTE (LAND) [] DAILY NOTE (AQUATIC ) [] PROGRESS NOTE [] DISCHARGE NOTE    [x] OUTPATIENT REHABILITATION CENTER [facility]   [] [place] [address]    [] [facility]   [] Armani Judd    Date: 2022  Patient Name:  Lily Bernardo  : 1960  MRN: [id number]  CSN: [id number]    Referring Practitioner Yue Rodriguez MD   Diagnosis Trochanteric bursitis, right hip [M70.61]  Trochanteric bursitis, left hip [M70.62]    Treatment Diagnosis Chronic B hip pain, core and hip weakness, tight hip flexors and piriformis   Date of Evaluation 22    Additional Pertinent History B TKA , HTN, diabetes, anxiety/depression, arthritis- hips, low back, knees. Functional Outcome Measure Used LEFS   Functional Outcome Score 43/80 (22)       Insurance: Primary: Payor: [employer]  /  /  / ,   Secondary:    Authorization Information: PRECERTIFICATION REQUIRED:  n/a  INSURANCE THERAPY BENEFIT:  Allowed 30 visits Physical Therapy /Occupational Therapy/Speech Therapy per calendar year. FCE-Covered, no precert required. Benefit will not cover maintenance or preventative treatment. AQUATIC THERAPY COVERED:   Yes  MODALITIES COVERED:  Yes. Iontophoresis and Hot/Cold Packs are not covered. TELEHEALTH COVERED: Yes   Visit # 5, 10 for progress note   Visits Allowed: 30   Recertification Date: 6/3/27   Physician Follow-Up:    Physician Orders:    History of Present Illness: Pt presents with B hip pain, L>R. Pt notes arthritis in hips for years for pain worsened in 2022. Pt was lying on heat pads then got to a point where it would allow her to sleep. Sitting is most uncomfortable. Pain aggravates with standing, small movements, work duties, stairs. Walking provides most relief. XRs of hips looked good with good spacing between bones so not a candidate for CAMMY.       SUBJECTIVE: Pt reports she worked all weekend so feeling sore from that. Pt notes ITB work caused extreme soreness, even to this day. Pt denies pain when sitting, but pain present with palpation and when working. Pt feels back is weak and may be contributing to some of the hip issues. Pt liked KT tape and just removed today. TREATMENT   Precautions:    Pain: 1/10 B hip    X in shaded column indicates activity completed today   Modalities Parameters/  Location  Notes   Phono with hydrocortisone cream B lateral hip - 1.0 MHz, 1.0 w/cm2 5 mins each hip x                Manual Therapy Time/Technique  Notes   STM IT band  x5 min each  Several restrictions present bilat                Exercise/Intervention   Notes   Hooklying piriformis IR stretch 3x30 sec      MO stretch- leg crossed pushing thigh away 2x 20 sec      LTR 5x10 sec  x    Hip flexor stretch off EOB 3x20 sec      Posterior pelvic tilt 10x5 sec  x    + hip ADD ball squeeze 10x5 sec  x    + knee falls out- bilat and unilat 10x5 sec ea  x    + modified deadbug       + bridge 10x5 sec  x           SKTC/DTKC  x3 20 sec     sidelying IT band stretch x3 20 sec            Nustep  5 min Level 2     3 way hip x10   Denied pain   Marching, HS curls, HR/TR x10   Notes more pain in knees   Mini squats x10      Seated piriformis  x3 20 sec            KT to bilat hips Star pattern- 80% tension   x      Specific Interventions Next Treatment: ultrasound to both hips, core and hip strengthening    Activity/Treatment Tolerance:  [x]  Patient tolerated treatment well  []  Patient limited by fatigue  []  Patient limited by pain   []  Patient limited by medical complications  []  Other:     Assessment: Initiated phonophoresis with hydrocortisone cream to both lateral hips. Re-applied KT tape to lateral hips. Introduced core stabilization exercises to help with standing and walking. No adverse effects to session.      GOALS:  Patient Goal: Pain free    Short Term Goals: defer to LTGs     Long Term Goals: 8 weeks  Patient will demonstrate good core engagement without anterior pelvic tilt during therapy session without cues  to carry over to lifting and moving around at work. Patient will deny left piriformis tightness for symptom control when sitting. 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 Patient will be independent and compliant with HEP to achieve above goals. Patient Education:   [x]  HEP/Education Completed: new core exercises with handout   [employer] Access Code: [email or web address]  []  No new Education completed  []  Reviewed Prior HEP      [x]  Patient verbalized and/or demonstrated understanding of education provided. []  Patient unable to verbalize and/or demonstrate understanding of education provided. Will continue education. []  Barriers to learning:     PLAN:  Treatment Recommendations: Strengthening, Range of Motion, Manual Therapy - Soft Tissue Mobilization, Pain Management, Home Exercise Program, Patient Education, Integrative Dry Needling, Aquatics and Modalities    []  Plan of care initiated. Plan to see patient 2 times per week for 8 weeks to address the treatment planned outlined above.   [x]  Continue with current plan of care   []  Modify plan of care as follows:    []  Hold pending physician visit  []  Discharge    Time In 0947   Time Out 1031   Timed Code Minutes: 44   Total Treatment Time: 44     Electronically Signed by: Vic Mathias PT

## 2022-04-29 ENCOUNTER — HOSPITAL ENCOUNTER (OUTPATIENT)
Dept: PHYSICAL THERAPY | Age: 62
Setting detail: THERAPIES SERIES
Discharge: HOME | End: 2022-04-29
Payer: MEDICARE

## 2022-04-29 PROCEDURE — 97110 THERAPEUTIC EXERCISES: CPT

## 2022-04-29 NOTE — PROGRESS NOTES
[address] [facility]  PHYSICAL THERAPY  [] EVALUATION  [x] DAILY NOTE (LAND) [] DAILY NOTE (AQUATIC ) [] PROGRESS NOTE [] DISCHARGE NOTE    [x] OUTPATIENT REHABILITATION CENTER [facility]   [] [place] [address]    [] [facility]   [] Natalya Plata    Date: 2022  Patient Name:  Dorothye Sacks  : 1960  MRN: [id number]  CSN: [id number]    Referring Practitioner Fabian Jane MD   Diagnosis Trochanteric bursitis, right hip [M70.61]  Trochanteric bursitis, left hip [M70.62]    Treatment Diagnosis Chronic B hip pain, core and hip weakness, tight hip flexors and piriformis   Date of Evaluation 22    Additional Pertinent History B TKA , HTN, diabetes, anxiety/depression, arthritis- hips, low back, knees. Functional Outcome Measure Used LEFS   Functional Outcome Score 43/80 (22)       Insurance: Primary: Payor: Neptali Green /  /  / ,   Secondary:    Authorization Information: PRECERTIFICATION REQUIRED:  n/a  INSURANCE THERAPY BENEFIT:  Allowed 30 visits Physical Therapy /Occupational Therapy/Speech Therapy per calendar year. FCE-Covered, no precert required. Benefit will not cover maintenance or preventative treatment. AQUATIC THERAPY COVERED:   Yes  MODALITIES COVERED:  Yes. Iontophoresis and Hot/Cold Packs are not covered. TELEHEALTH COVERED: Yes   Visit # 6, 10 for progress note   Visits Allowed: 30   Recertification Date: 03   Physician Follow-Up:    Physician Orders:    History of Present Illness: Pt presents with B hip pain, L>R. Pt notes arthritis in hips for years for pain worsened in 2022. Pt was lying on heat pads then got to a point where it would allow her to sleep. Sitting is most uncomfortable. Pain aggravates with standing, small movements, work duties, stairs. Walking provides most relief. XRs of hips looked good with good spacing between bones so not a candidate for CAMMY.       SUBJECTIVE: Pt reports she continues to feel the pain after work days and hips are very tender. KT tape seems to help. Pt reports left hip bothering her more. Pain aggravated with stairs and lifting. OBJECTIVE:  TREATMENT   Precautions:    Pain: L hip pain with climbing stairs and lifting boxes    X in shaded column indicates activity completed today   Modalities Parameters/  Location  Notes   Phono with hydrocortisone cream B lateral hip - 1.0 MHz, 1.0 w/cm2 5 mins each hip                 Manual Therapy Time/Technique  Notes   STM IT band  x5 min each  Several restrictions present bilat                Exercise/Intervention   Notes   Hooklying piriformis IR stretch 3x30 sec      MO stretch- leg crossed pushing thigh away 3x20 sec  x    LTR 10x5 sec  x    Hip flexor stretch off EOB 3x20 sec      Posterior pelvic tilt 10x5 sec  x    + hip ADD ball squeeze 10x5 sec  x    + knee falls out- bilat and unilat 10x5 sec ea  x    + modified deadbug 10  x    + bridge 10x5 sec  x           SKTC/DTKC  x3 20 sec     sidelying IT band stretch x3 20 sec x           Nustep  5 min Level 2     3 way hip x10  x No hip flexion   Marching, HS curls, HR/TR x10   Notes more pain in knees   Mini squats x10  x Cues for technique   Seated piriformis  x3 20 sec     Step ups: forward, lateral 10 4 inch x    KT to bilat hips Star pattern- 80% tension   x      Specific Interventions Next Treatment: ultrasound to both hips, core and hip strengthening    Activity/Treatment Tolerance:  [x]  Patient tolerated treatment well  []  Patient limited by fatigue  []  Patient limited by pain   []  Patient limited by medical complications  []  Other:     Assessment: Held phonophoresis today d/t minimal pain complaints. Resumed few standing exercises and added 4 inch step ups forward and lateral. Pt notes hip soreness when walking at end of session. Applied KT tape at end of session.      GOALS:  Patient Goal: Pain free    Short Term Goals: defer to LTGs     Long Term Goals: 8

## 2022-05-02 ENCOUNTER — HOSPITAL ENCOUNTER (OUTPATIENT)
Dept: PHYSICAL THERAPY | Age: 62
Setting detail: THERAPIES SERIES
Discharge: HOME | End: 2022-05-02
Payer: MEDICARE

## 2022-05-02 PROCEDURE — 97035 APP MDLTY 1+ULTRASOUND EA 15: CPT

## 2022-05-02 PROCEDURE — 97140 MANUAL THERAPY 1/> REGIONS: CPT

## 2022-05-02 PROCEDURE — 97110 THERAPEUTIC EXERCISES: CPT

## 2022-05-02 NOTE — PROGRESS NOTES
[address] [facility]  PHYSICAL THERAPY  [] EVALUATION  [x] DAILY NOTE (LAND) [] DAILY NOTE (AQUATIC ) [] PROGRESS NOTE [] DISCHARGE NOTE    [x] OUTPATIENT REHABILITATION CENTER [facility]   [] [place] [address]    [] [facility]   [] Natalya Plata    Date: 2022  Patient Name:  Dorothye Sacks  : 1960  MRN: [id number]  CSN: [id number]    Referring Practitioner Fabian Jane MD   Diagnosis Trochanteric bursitis, right hip [M70.61]  Trochanteric bursitis, left hip [M70.62]    Treatment Diagnosis Chronic B hip pain, core and hip weakness, tight hip flexors and piriformis   Date of Evaluation 22    Additional Pertinent History B TKA , HTN, diabetes, anxiety/depression, arthritis- hips, low back, knees. Functional Outcome Measure Used LEFS   Functional Outcome Score 43/80 (22)       Insurance: Primary: Payor: Neptali Green /  /  / ,   Secondary:    Authorization Information: PRECERTIFICATION REQUIRED:  n/a  INSURANCE THERAPY BENEFIT:  Allowed 30 visits Physical Therapy /Occupational Therapy/Speech Therapy per calendar year. FCE-Covered, no precert required. Benefit will not cover maintenance or preventative treatment. AQUATIC THERAPY COVERED:   Yes  MODALITIES COVERED:  Yes. Iontophoresis and Hot/Cold Packs are not covered. TELEHEALTH COVERED: Yes   Visit # 7, 710 for progress note   Visits Allowed: 30   Recertification Date: 64   Physician Follow-Up:    Physician Orders:    History of Present Illness: Pt presents with B hip pain, L>R. Pt notes arthritis in hips for years for pain worsened in 2022. Pt was lying on heat pads then got to a point where it would allow her to sleep. Sitting is most uncomfortable. Pain aggravates with standing, small movements, work duties, stairs. Walking provides most relief. XRs of hips looked good with good spacing between bones so not a candidate for CAMMY.       SUBJECTIVE: Pt reports she felt different for first 24 hours after holding ultrasound last session. ITB doesn't feel near as tight. Pt has been rubbing ITB at home. Pt notes LBP sitting currently. Pain was normal to low after work this weekend. OBJECTIVE:  TREATMENT   Precautions:    Pain: 2/10 low back    X in shaded column indicates activity completed today   Modalities Parameters/  Location  Notes   Phono with hydrocortisone cream B lateral hip - 1.0 MHz, 1.0 w/cm2 5 mins each hip x                Manual Therapy Time/Technique  Notes   STM IT band with tiger tail B 8 min x                Exercise/Intervention   Notes   Hooklying piriformis IR stretch 2x20 sec R  4x20 sec L  x    Figure 4 piriformis stretch 2x20 sec   x Right only, left painful so held   LTR 10x5 sec  x    Hip flexor stretch off EOB 3x20 sec  x    Posterior pelvic tilt 10x5 sec      + hip ADD ball squeeze 10x5 sec      + knee falls out- bilat and unilat 10x5 sec ea      + modified deadbug 10      + bridge 10x5 sec             SKTC/DTKC  x3 20 sec     sidelying IT band stretch x3 20 sec x           Nustep  5 min Level 2     3 way hip x10   No hip flexion   Marching, HS curls, HR/TR x10   Notes more pain in knees   Mini squats x10   Cues for technique   Seated piriformis  x3 20 sec     Step ups: forward, lateral 10 4 inch     KT to bilat hips Star pattern- 80% tension   x      Specific Interventions Next Treatment: ultrasound to both hips, core and hip strengthening    Activity/Treatment Tolerance:  [x]  Patient tolerated treatment well  []  Patient limited by fatigue  []  Patient limited by pain   []  Patient limited by medical complications  []  Other:     Assessment: Started with rolling to bilateral ITB with tenderness noted. Continued with stretches, but more tender in left hip with figure 4 piriformis stretch so held and only did IR stretch. Finished with phonophoresis and KT tape application. Pt c/o soreness in hips, especially left at end of session.      GOALS:  Patient Goal: Pain free    Short Term Goals: defer to LTGs     Long Term Goals: 8 weeks  Patient will demonstrate good core engagement without anterior pelvic tilt during therapy session without cues  to carry over to lifting and moving around at work. Patient will deny left piriformis tightness for symptom control when sitting. 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 Patient will be independent and compliant with HEP to achieve above goals. Patient Education:   []  HEP/Education Completed: monitor effects of no ultrasound and progressions   [employer] Access Code: [email or web address]  []  No new Education completed  [x]  Reviewed Prior HEP      [x]  Patient verbalized and/or demonstrated understanding of education provided. []  Patient unable to verbalize and/or demonstrate understanding of education provided. Will continue education. []  Barriers to learning:     PLAN:  Treatment Recommendations: Strengthening, Range of Motion, Manual Therapy - Soft Tissue Mobilization, Pain Management, Home Exercise Program, Patient Education, Integrative Dry Needling, Aquatics and Modalities    []  Plan of care initiated. Plan to see patient 2 times per week for 8 weeks to address the treatment planned outlined above.   [x]  Continue with current plan of care   []  Modify plan of care as follows:    []  Hold pending physician visit  []  Discharge    Time In 0947   Time Out 1032   Timed Code Minutes: 45   Total Treatment Time: 45     Electronically Signed by: Aleyda Hodges PT

## 2022-05-06 ENCOUNTER — HOSPITAL ENCOUNTER (OUTPATIENT)
Dept: PHYSICAL THERAPY | Age: 62
Setting detail: THERAPIES SERIES
Discharge: HOME | End: 2022-05-06
Payer: MEDICARE

## 2022-05-06 PROCEDURE — 97035 APP MDLTY 1+ULTRASOUND EA 15: CPT

## 2022-05-06 PROCEDURE — 97110 THERAPEUTIC EXERCISES: CPT

## 2022-05-06 NOTE — PROGRESS NOTES
[address] [facility]  PHYSICAL THERAPY  [] EVALUATION  [x] DAILY NOTE (LAND) [] DAILY NOTE (AQUATIC ) [] PROGRESS NOTE [] DISCHARGE NOTE    [x] OUTPATIENT REHABILITATION [facility]   [] [place] [address]    [] [facility]    [] [email or web address]    Date: 2022  Patient Name:  Elisa Navarro  : 1960  MRN: [id number]  CSN: [id number]    Referring Practitioner Iggy Miguel MD   Diagnosis Trochanteric bursitis, right hip [M70.61]  Trochanteric bursitis, left hip [M70.62]    Treatment Diagnosis Chronic B hip pain, core and hip weakness, tight hip flexors and piriformis   Date of Evaluation 22    Additional Pertinent History B TKA , HTN, diabetes, anxiety/depression, arthritis- hips, low back, knees. Functional Outcome Measure Used LEFS   Functional Outcome Score 43/80 (22)       Insurance: Primary: Payor: Zahira Marquis /  /  / ,   Secondary:    Authorization Information: PRECERTIFICATION REQUIRED:  n/a  INSURANCE THERAPY BENEFIT:  Allowed 30 visits Physical Therapy /Occupational Therapy/Speech Therapy per calendar year. FCE-Covered, no precert required. Benefit will not cover maintenance or preventative treatment. AQUATIC THERAPY COVERED:   Yes  MODALITIES COVERED:  Yes. Iontophoresis and Hot/Cold Packs are not covered. TELEHEALTH COVERED: Yes   Visit # 8, 8/10 for progress note   Visits Allowed: 30   Recertification Date: 30   Physician Follow-Up:    Physician Orders:    History of Present Illness: Pt presents with B hip pain, L>R. Pt notes arthritis in hips for years for pain worsened in 2022. Pt was lying on heat pads then got to a point where it would allow her to sleep. Sitting is most uncomfortable. Pain aggravates with standing, small movements, work duties, stairs. Walking provides most relief. XRs of hips looked good with good spacing between bones so not a candidate for CAMMY.       SUBJECTIVE: Pt feels she took a few steps backward since she tried figure 4 piriformis stretch last visit, she had more pain/pulling in left buttock. Pt hasn't used any ice or heat since pain increased. OBJECTIVE:  TREATMENT   Precautions:    Pain: 0/10 at rest; 6/10 standing after prolonged sitting, 4/10 with stairs    X in shaded column indicates activity completed today   Modalities Parameters/  Location  Notes   Phono with hydrocortisone cream B lateral hip - 1.0 MHz, 1.0 w/cm2 5 mins each hip x                Manual Therapy Time/Technique  Notes   STM IT band with tiger tail B 8 min                 Exercise/Intervention   Notes   Hooklying piriformis IR stretch 2x20 sec R  4x20 sec L  x    Figure 4 piriformis stretch 2x20 sec   x Right only, left painful so held   LTR 10x5 sec  x    Hip flexor stretch off EOB 3x20 sec      Posterior pelvic tilt 10x5 sec      + hip ADD ball squeeze 10x5 sec      + knee falls out- bilat and unilat 10x5 sec ea      + modified deadbug 10      + bridge 10x5 sec             SKTC/DTKC  x3 20 sec x    sidelying IT band stretch x3 20 sec x    Hamstring 90/90 stretch with towel 3x20 sec  x    Nustep  5 min Level 2     3 way hip x10   No hip flexion   Marching, HS curls, HR/TR x10   Notes more pain in knees   Mini squats x10   Cues for technique   Seated piriformis  x3 20 sec     Step ups: forward, lateral 10 4 inch     KT to bilat hips Star pattern- 80% tension   x      Specific Interventions Next Treatment: ultrasound to both hips, core and hip strengthening    Activity/Treatment Tolerance:  [x]  Patient tolerated treatment well  []  Patient limited by fatigue  []  Patient limited by pain   []  Patient limited by medical complications  []  Other:     Assessment: Focused on stretching d/t increased pain, adding hamstring stretch. Pt felt tension with stretches but kept them gentle. Ended with phonophoresis and KT tape application. No tenderness noted in left piriformis.  Pt likely overstretched last session triggering pain.  No adverse effects to session. GOALS:  Patient Goal: Pain free    Short Term Goals: defer to LTGs     Long Term Goals: 8 weeks  Patient will demonstrate good core engagement without anterior pelvic tilt during therapy session without cues  to carry over to lifting and moving around at work. Patient will deny left piriformis tightness for symptom control when sitting. 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 Patient will be independent and compliant with HEP to achieve above goals. Patient Education:   [x]  HEP/Education Completed: SKTC, DKTC, hamstring stretches with handout   [employer] Access Code: [email or web address]  []  No new Education completed  [x]  Reviewed Prior HEP      [x]  Patient verbalized and/or demonstrated understanding of education provided. []  Patient unable to verbalize and/or demonstrate understanding of education provided. Will continue education. []  Barriers to learning:     PLAN:  Treatment Recommendations: Strengthening, Range of Motion, Manual Therapy - Soft Tissue Mobilization, Pain Management, Home Exercise Program, Patient Education, Integrative Dry Needling, Aquatics and Modalities    []  Plan of care initiated. Plan to see patient 2 times per week for 8 weeks to address the treatment planned outlined above.   [x]  Continue with current plan of care   []  Modify plan of care as follows:    []  Hold pending physician visit  []  Discharge    Time In 0947   Time Out 1032   Timed Code Minutes: 45   Total Treatment Time: 45     Electronically Signed by: Amy Ramirez PT

## 2022-05-09 ENCOUNTER — HOSPITAL ENCOUNTER (OUTPATIENT)
Dept: PHYSICAL THERAPY | Age: 62
Setting detail: THERAPIES SERIES
Discharge: HOME | End: 2022-05-09
Payer: MEDICARE

## 2022-05-09 PROCEDURE — 97110 THERAPEUTIC EXERCISES: CPT

## 2022-05-09 PROCEDURE — 97035 APP MDLTY 1+ULTRASOUND EA 15: CPT

## 2022-05-09 NOTE — PROGRESS NOTES
[address] [facility]  PHYSICAL THERAPY  [] EVALUATION  [x] DAILY NOTE (LAND) [] DAILY NOTE (AQUATIC ) [] PROGRESS NOTE [] DISCHARGE NOTE    [x] OUTPATIENT REHABILITATION CENTER [facility]   [] [place] [address]    [] [facility]    [] Aliya Espitia    Date: 2022  Patient Name:  Ashley Andrade  : 1960  MRN: [id number]  CSN: [id number]    Referring Practitioner Wilton Lucas MD   Diagnosis Trochanteric bursitis, right hip [M70.61]  Trochanteric bursitis, left hip [M70.62]    Treatment Diagnosis Chronic B hip pain, core and hip weakness, tight hip flexors and piriformis   Date of Evaluation 22    Additional Pertinent History B TKA , HTN, diabetes, anxiety/depression, arthritis- hips, low back, knees. Functional Outcome Measure Used LEFS   Functional Outcome Score 43/80 (22)       Insurance: Primary: Payor: Homer Mendieta /  /  / ,   Secondary:    Authorization Information: PRECERTIFICATION REQUIRED:  n/a  INSURANCE THERAPY BENEFIT:  Allowed 30 visits Physical Therapy /Occupational Therapy/Speech Therapy per calendar year. FCE-Covered, no precert required. Benefit will not cover maintenance or preventative treatment. AQUATIC THERAPY COVERED:   Yes  MODALITIES COVERED:  Yes. Iontophoresis and Hot/Cold Packs are not covered. TELEHEALTH COVERED: Yes   Visit # 9, 9/10 for progress note   Visits Allowed: 30   Recertification Date: 66   Physician Follow-Up:    Physician Orders:    History of Present Illness: Pt presents with B hip pain, L>R. Pt notes arthritis in hips for years for pain worsened in 2022. Pt was lying on heat pads then got to a point where it would allow her to sleep. Sitting is most uncomfortable. Pain aggravates with standing, small movements, work duties, stairs. Walking provides most relief. XRs of hips looked good with good spacing between bones so not a candidate for CAMMY.       SUBJECTIVE: Pt states 2-3/10 pain with movement. Notes taking the elevator more at work. OBJECTIVE:  TREATMENT   Precautions:    Pain: 0/10 at rest; 2-3/10 standing after prolonged sitting, 4/10 with stairs    X in shaded column indicates activity completed today   Modalities Parameters/  Location  Notes   Phono with hydrocortisone cream B lateral hip - 1.0 MHz, 1.0 w/cm2 5 mins each hip x                Manual Therapy Time/Technique  Notes   STM IT band with tiger tail B 8 min                 Exercise/Intervention   Notes   Hooklying piriformis IR stretch 2x20 sec R  4x20 sec L      Figure 4 piriformis stretch 2x20 sec    Right only, left painful so held   LTR 10x5 sec  x    Hip flexor stretch off EOB 3x20 sec      Posterior pelvic tilt 10x5 sec      + hip ADD ball squeeze 10x5 sec      + knee falls out- bilat and unilat 10x5 sec ea      + modified deadbug 10      + bridge 10x5 sec             SKTC/DTKC  x3 20 sec x    sidelying IT band stretch x3 20 sec     Hamstring 90/90 stretch with towel 3x20 sec      Nustep  5 min Level 2     3 way hip x10   No hip flexion   Marching, HS curls, HR/TR x10   Notes more pain in knees   Mini squats x10   Cues for technique   Seated piriformis  x3 20 sec     Step ups: forward, lateral 10 4 inch     KT to bilat hips Star pattern- 80% tension    Held      Specific Interventions Next Treatment: ultrasound to both hips, core and hip strengthening    Activity/Treatment Tolerance:  [x]  Patient tolerated treatment well  []  Patient limited by fatigue  []  Patient limited by pain   []  Patient limited by medical complications  []  Other:     Assessment: Pt arrived 15 min late to appointment. Pt stated her appointment was at 945 am originally. Pt appointment was changed on May 4 from 945 am to 930 am and pt stated not being informed of change. Limited treatment session. Pt stated feeling ok at end of treatment session. Will continue to progress as tolerated by pt per POC.      GOALS:  Patient Goal: Pain free    Short Term Goals: defer to LTGs     Long Term Goals: 8 weeks  Patient will demonstrate good core engagement without anterior pelvic tilt during therapy session without cues  to carry over to lifting and moving around at work. Patient will deny left piriformis tightness for symptom control when sitting. 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 Patient will be independent and compliant with HEP to achieve above goals. Patient Education:   []  HEP/Education Completed: SKTC, DKTC, hamstring stretches with handout   [employer] Access Code: [email or web address]  []  No new Education completed  [x]  Reviewed Prior HEP      [x]  Patient verbalized and/or demonstrated understanding of education provided. []  Patient unable to verbalize and/or demonstrate understanding of education provided. Will continue education. []  Barriers to learning:     PLAN:  Treatment Recommendations: Strengthening, Range of Motion, Manual Therapy - Soft Tissue Mobilization, Pain Management, Home Exercise Program, Patient Education, Integrative Dry Needling, Aquatics and Modalities    []  Plan of care initiated. Plan to see patient 2 times per week for 8 weeks to address the treatment planned outlined above.   [x]  Continue with current plan of care   []  Modify plan of care as follows:    []  Hold pending physician visit  []  Discharge    Time In 0950   Time Out 1015   Timed Code Minutes: 25   Total Treatment Time: 25     Electronically Signed by: Debbie Arriaga PTA

## 2022-05-13 ENCOUNTER — HOSPITAL ENCOUNTER (OUTPATIENT)
Dept: PHYSICAL THERAPY | Age: 62
Setting detail: THERAPIES SERIES
Discharge: HOME | End: 2022-05-13
Payer: MEDICARE

## 2022-05-13 PROCEDURE — 97110 THERAPEUTIC EXERCISES: CPT

## 2022-05-13 PROCEDURE — 97035 APP MDLTY 1+ULTRASOUND EA 15: CPT

## 2022-05-13 NOTE — PROGRESS NOTES
[address] [facility]  PHYSICAL THERAPY  [] EVALUATION  [] DAILY NOTE (LAND) [] DAILY NOTE (AQUATIC ) [x] PROGRESS NOTE [] DISCHARGE NOTE    [x] OUTPATIENT REHABILITATION CENTER [facility]   [] [place] [address]    [] [facility]    [] Debbie Harper    Date: 2022  Patient Name:  Elzbieta Wills  : 1960  MRN: [id number]  CSN: [id number]    Referring Practitioner Ariel Pacheco MD   Diagnosis Trochanteric bursitis, right hip [M70.61]  Trochanteric bursitis, left hip [M70.62]    Treatment Diagnosis Chronic B hip pain, core and hip weakness, tight hip flexors and piriformis   Date of Evaluation 22    Additional Pertinent History B TKA , HTN, diabetes, anxiety/depression, arthritis- hips, low back, knees. Functional Outcome Measure Used LEFS   Functional Outcome Score 43/80 (22)       Insurance: Primary: Payor: Teresa Leslie /  /  / ,   Secondary:    Authorization Information: PRECERTIFICATION REQUIRED:  n/a  INSURANCE THERAPY BENEFIT:  Allowed 30 visits Physical Therapy /Occupational Therapy/Speech Therapy per calendar year. FCE-Covered, no precert required. Benefit will not cover maintenance or preventative treatment. AQUATIC THERAPY COVERED:   Yes  MODALITIES COVERED:  Yes. Iontophoresis and Hot/Cold Packs are not covered. TELEHEALTH COVERED: Yes   Visit # 10, 0/10 for progress note   Visits Allowed: 30   Recertification Date:    Physician Follow-Up:    Physician Orders:    History of Present Illness: Pt presents with B hip pain, L>R. Pt notes arthritis in hips for years for pain worsened in 2022. Pt was lying on heat pads then got to a point where it would allow her to sleep. Sitting is most uncomfortable. Pain aggravates with standing, small movements, work duties, stairs. Walking provides most relief. XRs of hips looked good with good spacing between bones so not a candidate for CAMMY.       SUBJECTIVE: Pt reports she hasn't worked since Tuesday, so hips are feeling pretty good. Pt denies pain at currently at rest, at worst 2/10 over the past 2 days. Pt has been taking it easy with time off work. Overall therapy is making a difference. OBJECTIVE:  TREATMENT   Precautions:    Pain: 0/10 at rest, 2/10 with activity    X in shaded column indicates activity completed today   Modalities Parameters/  Location  Notes   Phono with hydrocortisone cream B lateral hip - 1.0 MHz, 1.0 w/cm2 5 mins each hip x                Manual Therapy Time/Technique  Notes   STM IT band with tiger tail B 8 min                 Exercise/Intervention   Notes   Hooklying piriformis IR stretch 2x20 sec R  4x20 sec L      Figure 4 piriformis stretch 2x20 sec    Right only, left painful so held   LTR 10x5 sec      Hip flexor stretch off EOB 3x20 sec      Posterior pelvic tilt 15*x5 sec  x    + hip ADD ball squeeze 15*x5 sec  x    + knee falls out- bilat and unilat with tband 10x5 sec ea [employer]* x    + modified deadbug 15  x    + bridge 15*x5 sec  x    SL hip ABD   x Attempted on left but caused intense hip pain   Clamshell 10x5 sec  x Pain started at rep 6   Reverse chamshell 10x5 sec  x    SKTC/DTKC  x3 20 sec     sidelying IT band stretch x3 20 sec     Hamstring 90/90 stretch with towel 3x20 sec      Nustep  5 min Level 2     3 way hip x10   No hip flexion   Marching, HS curls, HR/TR x10   Notes more pain in knees   Mini squats x10   Cues for technique   Seated piriformis  x3 20 sec     Step ups: forward, lateral 10 4 inch     KT to bilat hips Star pattern- 80% tension    Held      Specific Interventions Next Treatment: ultrasound to both hips, core and hip strengthening    Activity/Treatment Tolerance:  [x]  Patient tolerated treatment well  []  Patient limited by fatigue  []  Patient limited by pain   []  Patient limited by medical complications  []  Other:     Assessment: Pt slowly progressing toward goals.  Pt continues to have pain during and after work and with sitting. Pt verbalizes improved awareness of core engagement but still has room for improvement. Left hip flexion is weak and ABD is painful. Left hip has loosened up but still has tightness. Ultrasound continues to help manage pain. Added clamshells and reverse clamshells, with pt c/o pain on left with clamshell. Attempted hip ABD but painful on left so held. Pt will benefit from continued PT to further address pain, ROM, and strength. GOALS:  Patient Goal: Pain free    Short Term Goals: defer to LTGs     Long Term Goals: 8 weeks  Patient will demonstrate good core engagement without anterior pelvic tilt during therapy session without cues  to carry over to lifting and moving around at work. GOAL NOT MET: Pt demos improved awareness of core engagement but occasaionl cue needed and pt continues to work on it at work. Continue Goal  Patient will deny left piriformis tightness for symptom control when sitting. GOAL NOT MET: sitting and work are biggest pain triggers, with hips being less tight. Continue Goal  Patient will improve bilateral hip flexion and lower abdominal strength to 4+/5 for stabilization when unpacking boxes at work. GOAL NOT MET: hip flexion right 5/5, left 4-/5 with pain; lower abdominals 4/5, pain persists with work duties. Continue Goal  Patient will be independent and compliant with HEP to achieve above goals. GOAL MET:  Pt compliant with current prescribed HEP daily, will continue to progress. Continue Goal        Patient Education:   [x]  HEP/Education Completed: clams and reverse clams, start at 5 reps if needed   [employer] Access Code: [email or web address]  []  No new Education completed  [x]  Reviewed Prior HEP      [x]  Patient verbalized and/or demonstrated understanding of education provided. []  Patient unable to verbalize and/or demonstrate understanding of education provided. Will continue education.   []  Barriers to learning:     PLAN:  Treatment Recommendations: Strengthening, Range of Motion, Manual Therapy - Soft Tissue Mobilization, Pain Management, Home Exercise Program, Patient Education, Integrative Dry Needling, Aquatics and Modalities    []  Plan of care initiated. Plan to see patient 2 times per week for 8 weeks to address the treatment planned outlined above.   [x]  Continue with current plan of care   []  Modify plan of care as follows:    []  Hold pending physician visit  []  Discharge    Time In 0947   Time Out 1030   Timed Code Minutes: 43   Total Treatment Time: 43     Electronically Signed by: Porfirio Levi PT

## 2022-05-17 ENCOUNTER — HOSPITAL ENCOUNTER (OUTPATIENT)
Dept: PHYSICAL THERAPY | Age: 62
Setting detail: THERAPIES SERIES
Discharge: HOME | End: 2022-05-17
Payer: MEDICARE

## 2022-05-17 PROCEDURE — 97035 APP MDLTY 1+ULTRASOUND EA 15: CPT

## 2022-05-17 PROCEDURE — 97110 THERAPEUTIC EXERCISES: CPT

## 2022-05-17 NOTE — PROGRESS NOTES
today. Cleaned rooms in the [place] of her house yesterday. Reports difficulty sleeping last night and pain with walking today. Reports 3/10 in L hip. OBJECTIVE:  TREATMENT   Precautions:    Pain: 3/10 L hip with activity, 0/10 at rest    X in shaded column indicates activity completed today   Modalities Parameters/  Location  Notes   Phono with hydrocortisone cream B lateral hip - 1.0 MHz, 1.0 w/cm2 5 mins each hip x                Manual Therapy Time/Technique  Notes   STM IT band with tiger tail B 8 min                 Exercise/Intervention   Notes   Hooklying piriformis IR stretch 2x20 sec R  4x20 sec L  x    Figure 4 piriformis stretch 2x20 sec   x Right only, left painful so held   LTR 10x5 sec  x    Hip flexor stretch off EOB 3x20 sec  x    Posterior pelvic tilt 15*x5 sec  x    + hip ADD ball squeeze 15*x5 sec  x    + knee falls out- bilat and unilat with tband 10x5 sec ea Orange*     + modified deadbug 15      + bridge 15*x5 sec  x    SL hip ABD    Attempted on left but caused intense hip pain   Clamshell 10x5 sec   Pain started at rep 6   Reverse chamshell 10x5 sec      SKTC/DTKC  x3 20 sec     sidelying IT band stretch x3 20 sec x    Hamstring 90/90 stretch with towel 3x20 sec  x    Nustep  5 min Level 2     3 way hip x10   No hip flexion   Marching, HS curls, HR/TR x10   Notes more pain in knees   Mini squats x10   Cues for technique   Seated piriformis  x3 20 sec     Step ups: forward, lateral 10 4 inch     KT to bilat hips Star pattern- 80% tension   x      Specific Interventions Next Treatment: ultrasound to both hips, core and hip strengthening    Activity/Treatment Tolerance:  [x]  Patient tolerated treatment well  []  Patient limited by fatigue  []  Patient limited by pain   []  Patient limited by medical complications  []  Other:     Assessment: Pt reported L quad cramping with L hip flex/ER during stretching. Continued with B hip stretching and strengthening, as well as US and taping. Tolerated all interventions well and denied pain at end of session. GOALS:  Patient Goal: Pain free    Short Term Goals: defer to LTGs     Long Term Goals: 8 weeks  Patient will demonstrate good core engagement without anterior pelvic tilt during therapy session without cues  to carry over to lifting and moving around at work. Patient will deny left piriformis tightness for symptom control when sitting. 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 Patient will be independent and compliant with HEP to achieve above goals. Patient Education:   [x]  HEP/Education Completed: Continue HEP   [address] Access Code: [email or web address]  []  No new Education completed  [x]  Reviewed Prior HEP      [x]  Patient verbalized and/or demonstrated understanding of education provided. []  Patient unable to verbalize and/or demonstrate understanding of education provided. Will continue education. []  Barriers to learning:     PLAN:  Treatment Recommendations: Strengthening, Range of Motion, Manual Therapy - Soft Tissue Mobilization, Pain Management, Home Exercise Program, Patient Education, Integrative Dry Needling, Aquatics and Modalities    []  Plan of care initiated. Plan to see patient 2 times per week for 8 weeks to address the treatment planned outlined above.   [x]  Continue with current plan of care   []  Modify plan of care as follows:    []  Hold pending physician visit  []  Discharge    Time In 0930   Time Out 1020   Timed Code Minutes: 50   Total Treatment Time: 50     Electronically Signed by: Kuldeep Covarrubias PT

## 2022-05-20 ENCOUNTER — HOSPITAL ENCOUNTER (OUTPATIENT)
Dept: PHYSICAL THERAPY | Age: 62
Setting detail: THERAPIES SERIES
Discharge: HOME | End: 2022-05-20
Payer: MEDICARE

## 2022-05-20 PROCEDURE — 97035 APP MDLTY 1+ULTRASOUND EA 15: CPT

## 2022-05-20 PROCEDURE — 97110 THERAPEUTIC EXERCISES: CPT

## 2022-05-20 NOTE — PROGRESS NOTES
[id number]  PHYSICAL THERAPY  [] EVALUATION  [x] DAILY NOTE (LAND) [] DAILY NOTE (AQUATIC ) [] PROGRESS NOTE [] DISCHARGE NOTE    [x] OUTPATIENT REHABILITATION CENTER [facility]   [] [place] [address]    [] [facility]    [] [place]    Date: 2022  Patient Name:  Kwadwo Nathan  : 1960  MRN: [id number]  CSN: [id number]    Referring Practitioner Sabas Holguin MD   Diagnosis Trochanteric bursitis, right hip [M70.61]  Trochanteric bursitis, left hip [M70.62]    Treatment Diagnosis Chronic B hip pain, core and hip weakness, tight hip flexors and piriformis   Date of Evaluation 22    Additional Pertinent History B TKA , HTN, diabetes, anxiety/depression, arthritis- hips, low back, knees. Functional Outcome Measure Used LEFS   Functional Outcome Score 43/80 (22)       Insurance: Primary: Payor: Alesia Portillo /  /  / ,   Secondary:    Authorization Information: PRECERTIFICATION REQUIRED:  n/a  INSURANCE THERAPY BENEFIT:  Allowed 30 visits Physical Therapy /Occupational Therapy/Speech Therapy per calendar year. FCE-Covered, no precert required. Benefit will not cover maintenance or preventative treatment. AQUATIC THERAPY COVERED:   Yes  MODALITIES COVERED:  Yes. Iontophoresis and Hot/Cold Packs are not covered. TELEHEALTH COVERED: Yes   Visit # 12, 2/10 for progress note   Visits Allowed: 30   Recertification Date: 14   Physician Follow-Up:    Physician Orders:    History of Present Illness: Pt presents with B hip pain, L>R. Pt notes arthritis in hips for years for pain worsened in 2022. Pt was lying on heat pads then got to a point where it would allow her to sleep. Sitting is most uncomfortable. Pain aggravates with standing, small movements, work duties, stairs. Walking provides most relief. XRs of hips looked good with good spacing between bones so not a candidate for CAMMY.       SUBJECTIVE: Pt reports more soreness today. Notes cleaning more at home and busier with work and that tends to flare up her pain. Reports 3/10 in L hip. OBJECTIVE:  TREATMENT   Precautions:    Pain: 3/10 L hip with activity, 0/10 at rest    X in shaded column indicates activity completed today   Modalities Parameters/  Location  Notes   Phono with hydrocortisone cream B lateral hip - 1.0 MHz, 1.0 w/cm2 5 mins each hip x                Manual Therapy Time/Technique  Notes   STM IT band with tiger tail B 8 min                 Exercise/Intervention   Notes   Hooklying piriformis IR stretch 2x20 sec R  4x20 sec L  x    Figure 4 piriformis stretch 2x20 sec    Right only, left painful so held   LTR 10x5 sec  x    Hip flexor stretch off EOB 3x20 sec  x    Posterior pelvic tilt 15*x5 sec  x    + hip ADD ball squeeze 15*x5 sec      + knee falls out- bilat and unilat with tband 12x5 sec ea Orange* x    + modified deadbug 15      + bridge 15*x5 sec  x    SL hip ABD    Attempted on left but caused intense hip pain   Clamshell 10x5 sec   Pain started at rep 6   Reverse chamshell 10x5 sec      SKTC/DTKC  x3 20 sec     sidelying IT band stretch x3 20 sec x    Hamstring 90/90 stretch with towel 3x20 sec  x    Nustep  5 min Level 2     3 way hip x10   No hip flexion   Marching, HS curls, HR/TR x10   Notes more pain in knees   Mini squats x10   Cues for technique   Seated piriformis  x3 20 sec     Step ups: forward, lateral 10 4 inch     KT to bilat hips Star pattern- 85% tension   x  L hip only     Specific Interventions Next Treatment: ultrasound to both hips, core and hip strengthening    Activity/Treatment Tolerance:  [x]  Patient tolerated treatment well  []  Patient limited by fatigue  []  Patient limited by pain   []  Patient limited by medical complications  []  Other:     Assessment: Continued therex as stated above with increased reps. Held placement of KT tape on R hip due to pt stated L hip is greater issue.  Pt noted increased support in L hip with increased tension applied. Will continue to progress as tolerated by pt per POC. GOALS:  Patient Goal: Pain free    Short Term Goals: defer to LTGs     Long Term Goals: 8 weeks  Patient will demonstrate good core engagement without anterior pelvic tilt during therapy session without cues  to carry over to lifting and moving around at work. Patient will deny left piriformis tightness for symptom control when sitting. 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 Patient will be independent and compliant with HEP to achieve above goals. Patient Education:   [x]  HEP/Education Completed: Continue HEP  [employer] Access Code: [email or web address]  []  No new Education completed  [x]  Reviewed Prior HEP      [x]  Patient verbalized and/or demonstrated understanding of education provided. []  Patient unable to verbalize and/or demonstrate understanding of education provided. Will continue education. []  Barriers to learning:     PLAN:  Treatment Recommendations: Strengthening, Range of Motion, Manual Therapy - Soft Tissue Mobilization, Pain Management, Home Exercise Program, Patient Education, Integrative Dry Needling, Aquatics and Modalities    []  Plan of care initiated. Plan to see patient 2 times per week for 8 weeks to address the treatment planned outlined above.   [x]  Continue with current plan of care   []  Modify plan of care as follows:    []  Hold pending physician visit  []  Discharge    Time In 1001   Time Out 1046   Timed Code Minutes: 45   Total Treatment Time: 45     Electronically Signed by: Dionisio Miles PTA

## 2022-05-23 ENCOUNTER — HOSPITAL ENCOUNTER (OUTPATIENT)
Dept: PHYSICAL THERAPY | Age: 62
Setting detail: THERAPIES SERIES
End: 2022-05-23
Payer: MEDICARE

## 2022-05-23 ENCOUNTER — OFFICE VISIT (OUTPATIENT)
Dept: FAMILY MEDICINE CLINIC | Age: 62
End: 2022-05-23
Payer: MEDICARE

## 2022-05-23 VITALS
HEART RATE: 94 BPM | TEMPERATURE: 98.6 F | SYSTOLIC BLOOD PRESSURE: 104 MMHG | RESPIRATION RATE: 12 BRPM | OXYGEN SATURATION: 98 % | DIASTOLIC BLOOD PRESSURE: 58 MMHG

## 2022-05-23 DIAGNOSIS — U07.1 COVID-19: Primary | ICD-10-CM

## 2022-05-23 LAB
Lab: ABNORMAL
QC PASS/FAIL: ABNORMAL
SARS-COV-2 RDRP RESP QL NAA+PROBE: POSITIVE

## 2022-05-23 PROCEDURE — 3017F COLORECTAL CA SCREEN DOC REV: CPT | Performed by: FAMILY MEDICINE

## 2022-05-23 PROCEDURE — 99214 OFFICE O/P EST MOD 30 MIN: CPT | Performed by: FAMILY MEDICINE

## 2022-05-23 PROCEDURE — 87635 SARS-COV-2 COVID-19 AMP PRB: CPT | Performed by: FAMILY MEDICINE

## 2022-05-23 PROCEDURE — G8417 CALC BMI ABV UP PARAM F/U: HCPCS | Performed by: FAMILY MEDICINE

## 2022-05-23 PROCEDURE — G8427 DOCREV CUR MEDS BY ELIG CLIN: HCPCS | Performed by: FAMILY MEDICINE

## 2022-05-23 PROCEDURE — 1036F TOBACCO NON-USER: CPT | Performed by: FAMILY MEDICINE

## 2022-05-23 RX ORDER — MELOXICAM 15 MG/1
TABLET ORAL
COMMUNITY
Start: 2022-05-04 | End: 2022-10-11

## 2022-05-23 RX ORDER — AMOXICILLIN AND CLAVULANATE POTASSIUM 875; 125 MG/1; MG/1
1 TABLET, FILM COATED ORAL 2 TIMES DAILY
Qty: 20 TABLET | Refills: 0 | Status: SHIPPED | OUTPATIENT
Start: 2022-05-23 | End: 2022-06-02

## 2022-05-23 NOTE — PROGRESS NOTES
Chief Complaint   Patient presents with    Nasal Congestion    Cough    Diarrhea       History obtained from the patient. SUBJECTIVE:  Euel Paget is a 60 y.o. female that presents today     -COVID:  sxs started yesterday  UTD vaccination  RF: HTN, DM2, Age  Sxs: nasal congestion, cough. No fever. No SOB. Feels pretty good      Age/Gender Health Maintenance    Lipid -   Lab Results   Component Value Date    CHOL 196 10/07/2021    CHOL 245 05/12/2021    CHOL 239 09/22/2020     Lab Results   Component Value Date    TRIG 122 10/07/2021    TRIG 217 05/12/2021    TRIG 317 09/22/2020     Lab Results   Component Value Date    HDL 61 10/07/2021    HDL 59 05/12/2021    HDL 58 09/22/2020     Lab Results   Component Value Date    LDLCALC 113 10/07/2021    LDLCALC 147 05/12/2021    LDLCALC 125 09/22/2020     Lab Results   Component Value Date    VLDL 22 10/07/2021    VLDL 39 05/12/2021    VLDL 56 09/22/2020     No results found for: CHOLHDLRATIO    Colon Cancer Screening - Small polyp OCT 2013, repeat 7 to 10 years, per Sheik DE LA CRUZ. Lung Cancer Screening (Age 54 to [de-identified] with 30 pack year hx, current smoker or quit within past 15 years) - never soker    Tetanus - UTD AUG 2021  Influenza Vaccine - UTD FALL 2021  Pneumonia Vaccine - UTD AUG 2021  Zoster - 1 of 2 completed JAN 2021, pt will schedule 2nd dose. Breast Cancer Screening - NEG AUG 2021  Cervical Cancer Screening - hyst for benign reasons.    Osteoporosis Screening - neg June 2021    Diabetes Health Maintenance    A1C -   Lab Results   Component Value Date    LABA1C 6.1 02/08/2022    LABA1C 6.3 08/05/2021    LABA1C 7.1 05/13/2021       ACE/ARB - yes, cozaar  Eye - records pending  Foot - UTD AUG 2021  ASA - no  Microal/Cr -   Lab Results   Component Value Date    LABMICR < 1.20 02/08/2022    LABCREA 215.8 02/08/2022    MACRR 6 02/08/2022     No results found for: CREATINEUR, MICROALBUR, MALBCR    eGFR - >60, June 2021    No results found for: GFRESTIMATE  No results found for: LABGLOM  No results found for: CRCLEARANCE  No results found for: EGFRNONAA  No results found for: EGFRAA      Statin - no, intolerant      Current Outpatient Medications   Medication Sig Dispense Refill    meloxicam (MOBIC) 15 MG tablet TAKE 1 TABLET BY MOUTH EVERY DAY FOR 30 DAYS      nirmatrelvir/ritonavir (PAXLOVID) 20 x 150 MG & 10 x 100MG Take 3 tablets (two 150 mg nirmatrelvir and one 100 mg ritonavir tablets) by mouth every 12 hours for 5 days. 30 tablet 0    amoxicillin-clavulanate (AUGMENTIN) 875-125 MG per tablet Take 1 tablet by mouth 2 times daily for 10 days 20 tablet 0    losartan-hydroCHLOROthiazide (HYZAAR) 100-12.5 MG per tablet Take 1 tablet by mouth daily TAKE 1 TABLET BY MOUTH EVERY DAY 90 tablet 3    montelukast (SINGULAIR) 10 MG tablet Take 1 tablet by mouth nightly 90 tablet 3    estrogens, conjugated, (PREMARIN) 0.3 MG tablet Take 1 tablet by mouth daily 90 tablet 3    alirocumab (PRALUENT) 75 MG/ML SOAJ injection pen Inject 1 mL into the skin every 14 days 2 pen 11    ezetimibe (ZETIA) 10 MG tablet Take 1 tablet by mouth daily 90 tablet 3    metFORMIN (GLUCOPHAGE) 500 MG tablet Take 1 tablet by mouth 2 times daily (with meals) 180 tablet 3    diphenhydrAMINE (BENADRYL) 25 MG tablet Take 25 mg by mouth every 6 hours as needed for Itching      loratadine (CLARITIN) 10 MG tablet Take 10 mg by mouth daily      betamethasone valerate (VALISONE) 0.1 % lotion Apply topically 2 times daily. 60 mL 0    Omega-3 Fatty Acids (FISH OIL) 1000 MG CAPS Take 2,000 mg by mouth daily      cyclobenzaprine (FLEXERIL) 10 MG tablet Take 10 mg by mouth 3 times daily as needed for Muscle spasms       No current facility-administered medications for this visit.      Orders Placed This Encounter   Medications    nirmatrelvir/ritonavir (PAXLOVID) 20 x 150 MG & 10 x 100MG     Sig: Take 3 tablets (two 150 mg nirmatrelvir and one 100 mg ritonavir tablets) by mouth every 12 hours for 5 days.     Dispense:  30 tablet     Refill:  0     Order Specific Question:   Does this patient qualify for COVID-19 antIviral therapy based on criteria for treatment? Answer: Yes    amoxicillin-clavulanate (AUGMENTIN) 875-125 MG per tablet     Sig: Take 1 tablet by mouth 2 times daily for 10 days     Dispense:  20 tablet     Refill:  0         All medications reviewed and reconciled, including OTC and herbal medications. Updated list given to patient. Patient Active Problem List    Diagnosis Date Noted    DM2 (diabetes mellitus, type 2) ([facility] [address]     Obesity (BMI 30-39. 9)     Epidermal cyst of face 2021    Bilateral temporomandibular joint pain 2021    Chronic venous insufficiency of lower extremity 2021    Venous stasis dermatitis of both lower extremities 2021    Hypertension, essential     Surgical menopause     Statin intolerance      LFTS      Dyslipidemia        Past Medical History:   Diagnosis Date    [facility] 2) [facility]     Dyslipidemia     Hypertension, essential     Obesity (BMI 30-39. 9)     Statin intolerance     LFTS    Surgical menopause        Past Surgical History:   Procedure Laterality Date     SECTION       x 3     SECTION      x3    CHOLECYSTECTOMY      INTRACAPSULAR CATARACT EXTRACTION Left 10/13/2020    EXTRACAPSULAR CATARACT REMOVAL WITH INSERTION OF INTRAOCULAR LENS PROTHESIS LEFT performed by Flores Cordon MD at [address] Bilateral     knees    JEANINE AND BSO  1986    Endometriosis.        Allergies   Allergen Reactions    Statins      Liver issues    Valium [Diazepam] Other (See Comments)     Patient gets very irritated       Social History     Tobacco Use    Smoking status: Never Smoker    Smokeless tobacco: Never Used   Substance Use Topics    Alcohol use: No       Family History   Problem Relation Age of Onset    Diabetes Mother     High Blood Pressure Father     Heart Disease Father     Breast Cancer Other         1/2 sister    Colon Cancer Neg Hx          I have reviewed the patient's past medical history, past surgical history, allergies, medications, social and family history and I have made updates where appropriate. Review of Systems  Positive responses are highlighted in bold    Constitutional:  Fever, Chills, Night Sweats, Fatigue, Unexpected changes in weight  HENT:  Ear pain, Tinnitus, Nosebleeds, Trouble swallowing, Hearing loss, Sore throat  Cardiovascular:  Chest Pain, Palpitations, Orthopnea, Paroxysmal Nocturnal Dyspnea  Respiratory:  Cough, Wheezing, Shortness of breath, Chest tightness, Apnea  Gastrointestinal:  Nausea, Vomiting, Diarrhea, Constipation, Heartburn, Blood in stool  Genitourinary:  Difficulty or painful urination, Flank pain, Change in frequency, Urgency  Skin:  Color change, Rash, Itching, Wound  Musculoskeletal:  Joint pain, Back pain, Gait problems, Joint swelling, Myalgias  Neurological:  Dizziness, Headaches, Presyncope, Numbness, Seizures, Tremors  Endocrine:  Heat Intolerance, Cold Intolerance, Polydipsia, Polyphagia, Polyuria      PHYSICAL EXAM:  Vitals:    05/23/22 1346   BP: (!) 104/58   Pulse: 94   Resp: 12   Temp: 98.6 °F (37 °C)   TempSrc: Oral   SpO2: 98%     There is no height or weight on file to calculate BMI. VS Reviewed  General Appearance: A&O x 3, No acute distress,well developed and well- nourished  Eyes: pupils equal, round, and reactive to light, extraocular eye movements intact, conjunctivae and eye lids without erythema  ENT: bilateral TM normal without fluid or infection, neck without nodes, throat normal without erythema or exudate, sinuses nontender, post nasal drip noted, nasal mucosa congested and Oropharynx clear, without erythema, exudate, or thrush.   Neck: supple and non-tender without mass, no thyromegaly or thyroid nodules, no cervical lymphadenopathy  Pulmonary/Chest: clear to auscultation bilaterally- no wheezes, rales or rhonchi, normal air movement, no respiratory distress or retractions  Cardiovascular: S1 and S2 auscultated w/ RRR. No murmurs, rubs, clicks, or gallops, distal pulses intact. Abdomen: soft, non-tender, non-distended, bowel sounds physiologic,  no rebound or guarding, no masses or hernias noted. Liver and spleen without enlargement. Extremities: no cyanosis, clubbing or edema of the lower extremities. Skin: warm and dry, no rash or erythema      Office Visit on 05/23/2022   Component Date Value Ref Range Status    SARS-COV-2, RdRp gene 05/23/2022 Positive* Negative Final    Lot Number 05/23/2022 [id number]   Final    QC Pass/Fail 05/23/2022 pass   Final        ASSESSMENT & PLAN  1. COVID-19    + COVID  VSS  Exam reassuring  Mild sxs at present  UTD vaccine x 3  RF for more severe dz include HTN, DM2 and age  Fluids  Rest  Tylenol  Isolate x 5 days per CDC, 10 days quality masking  Paxlovid x 5 days  F/u if no better  Reviewed ER precautions, pt understands. - POCT COVID-19 Rapid, NAAT  - nirmatrelvir/ritonavir (PAXLOVID) 20 x 150 MG & 10 x 100MG; Take 3 tablets (two 150 mg nirmatrelvir and one 100 mg ritonavir tablets) by mouth every 12 hours for 5 days. Dispense: 30 tablet; Refill: 0      DISPOSITION    Return if symptoms worsen or fail to improve. Mchugh released without restrictions.     Future Appointments   Date Time Provider Tana Gracia   5/27/2022 10:45 AM Sundar Simpson PTA [facility] [place] [facility]   5/31/2022 10:15 AM Makenzie Page PT [facility]   6/3/2022 10:00 AM Michael Friedman PT [facility]   6/6/2022  9:45 AM Michael Friedman PT [facility]   6/10/2022  9:45 AM Michael Friedman PT [facility]   8/8/2022 10:20 AM Robbie Pepe DO [facility] [place]   10/13/2022  1:30  [facility], MD N [facility] [place]     PATIENT COUNSELING    Barriers to learning and self management: none    Discussed use, benefit, and side effects of prescribed medications. Barriers to medication compliance addressed. All patient questions answered. Pt voiced understanding.        Electronically signed by Robert Allison DO on 5/23/2022 at 2:09 PM

## 2022-05-23 NOTE — PATIENT INSTRUCTIONS
Patient Education        Learning About Coronavirus [phone])  What is coronavirus (COVID-19)? COVID-19 is a disease caused by a type of coronavirus. This illness was firstfound in December 2019. It has since spread worldwide. Coronaviruses are a large group of viruses. They cause the common cold. They also cause more serious illnesses like Middle East respiratory syndrome (MERS) and severe acute respiratory syndrome (SARS). COVID-19 is caused by a novelcoronavirus. That means it's a new type that has not been seen in people before. What are the symptoms? COVID-19 symptoms may include:   Fever.  Cough.  Trouble breathing.  Chills or repeated shaking with chills.  Muscle and body aches.  Headache.  Sore throat.  New loss of taste or smell.  Vomiting.  Diarrhea. In severe cases, COVID-19 can cause pneumonia and make it hard to breathewithout help from a machine. It can cause death. How is it diagnosed? COVID-19 is diagnosed with a viral test. This may also be called a PCR test or antigen test. It looks for evidence of the virus in your breathing passages orlungs (respiratory system). The test is most often done on a sample from the nose, throat, or lungs. It's sometimes done on a sample of saliva. One way a sample is collected is byputting a long swab into the back of your nose. If you have questions about COVID-19 testing, ask your doctor or go to cdc.govto use the COVID-19 Viral Testing Tool. How is it treated? Mild cases of COVID-19 can be treated at home. Serious cases need treatment in the hospital. Treatment may include medicines to reduce symptoms, plus breathing support such as oxygen therapy or a ventilator. Some people may beplaced on their belly to help their oxygen levels. Treatments that may help people who have COVID-19 include:  Antiviral medicines. These medicines treat viral infections. Immune-based therapy. These medicines help the immune system fight COVID-19. Examples include monoclonal antibodies. Blood thinners. These medicines help prevent blood clots. People with severe illness are at risk for blood clots. How can you protect yourself and others?  Get vaccinated and boosted.  Avoid sick people and stay away from others if you are sick.  Stay at least 6 feet away from other people.  Avoid crowds, especially inside.  Get tested for COVID-19 before you have an indoor visit with people that don't live with you.  Cover your mouth with a tissue when you cough or sneeze.  Wash your hands often, especially after you cough or sneeze. Use soap and water, and scrub for at least 20 seconds. If soap and water aren't available, use an alcohol-based hand .  Avoid touching your mouth, nose, and eyes. Be sure to follow all instructions from the [facility] and your local health authorities. Here are some examples of specific precautions you may need totake.  If you are not fully vaccinated and boosted:  ? Wear a mask if you have to go to public areas.  Even if you're fully vaccinated and boosted, there's still a chance you can get and spread COVID-19. If you live in an area where COVID-19 is spreading quickly, wear a mask if you have to go to indoor public areas. You might also want to wear a mask in crowded outdoor areas as well as public indoor spaces if you:  ? Have certain health conditions. ? Live with someone who has a compromised immune system. ? Live with someone who is not fully vaccinated.  If you have been exposed to the virus and are not fully vaccinated and boosted:  ? Talk to your doctor as soon as you can. Your doctor might have you take medicine to help prevent serious illness. ? Get a COVID-19 test. You may need to be tested more than once. ? Stay home. Try to separate from other people where you live. Don't go to school, work, or public areas. ? Wear a mask around other people for a full 10 days.  Avoid travel and stay away from people at high risk for serious illness. ? Watch for symptoms.  If you have been exposed and you are fully vaccinated and boosted:  ? Talk to your doctor as soon as you can. Your doctor may have you take medicine to help prevent serious illness. ? Get a COVID-19 test. You may need to be tested more than once. ? Wear a mask around other people for a full 10 days. Avoid travel and stay away from people at high risk for serious illness. ? Watch for symptoms. If you're sick or test positive for COVID-19:   Talk to your doctor as soon as you can. Your doctor may have you take medicine to help prevent serious illness.  Get a COVID-19 test unless you have already been tested. You may need to be tested more than once.  Stay home. Leave only if you need to get medical care.  Wear a mask whenever you're around other people for a full 10 days.  Avoid travel and stay away from people at high risk for serious illness.  Limit contact with pets and people in your home. If possible, stay in a separate bedroom and use a separate bathroom.  Clean and disinfect your home every day. Use household  and disinfectant wipes or sprays. Take special care to clean things that you touch with your hands. If you have questions about COVID-19 testing, ask your doctor or go to cdc.govto use the COVID-19 Viral Testing Tool. How can you self-isolate when you have COVID-19? If you have COVID-19, there are things you can do to help avoid spreading thevirus to others.  Limit contact with people in your home. If possible, stay in a separate bedroom and use a separate bathroom.  Wear a mask when you are around other people.  If you have to leave home, avoid crowds and try to stay at least 6 feet away from other people.  Avoid contact with pets and other animals.  Cover your mouth and nose with a tissue when you cough or sneeze. Then throw it in the trash right away.    Wash your hands often, especially after you cough or sneeze. Use soap and water, and scrub for at least 20 seconds. If soap and water aren't available, use an alcohol-based hand .  Don't share personal household items. These include bedding, towels, cups and glasses, and eating utensils. [address] in the warmest water allowed for the fabric type, and dry it completely. It's okay to wash other people's laundry with yours.  Clean and disinfect your home. Use household  and disinfectant wipes or sprays. When should you call for help? Call 911 anytime you think you may need emergency care. For example, call if you have life-threatening symptoms, such as:     You have severe trouble breathing. (You can't talk at all.)      You have constant chest pain or pressure.      You are severely dizzy or lightheaded.      You are confused or can't think clearly.      You have pale, gray, or blue-colored skin or lips.      You pass out (lose consciousness) or are very hard to wake up.      You have loss of balance or trouble walking.      You have trouble seeing out of one or both eyes.      You have weakness or drooping on one side of the face.      You have weakness or numbness in an arm or a leg.      You have trouble speaking.      You have a severe headache.      You have a seizure. Call your doctor now or seek immediate medical care if:     You have moderate trouble breathing. (You can't speak a full sentence.)      You are coughing up blood.      You have signs of low blood pressure. These include feeling lightheaded; being too weak to stand; and having cold, pale, clammy skin. Watch closely for changes in your health, and be sure to contact your doctor if:     Your symptoms get worse.      You are not getting better as expected.      You have new or worse symptoms of anxiety, depression, nightmares, or flashbacks. Call before you go to the doctor's office. Follow their instructions. And wear a mask.   Where can you learn more?  Go to [email or web address] and sign in to your Key[employer] account. Enter C008 in the [facility] box to learn more about \"Learning About Coronavirus (COVID-19). \"     If you do not have an account, please click on the \"Sign Up Now\" link. Current as of: July 1, 2021               Content Version: 13.2  © 2006-2022 [employer]. Care instructions adapted under license by [facility] ([facility]). If you have questions about a medical condition or this instruction, always ask your healthcare professional. [place] [address] any warranty or liability for your use of this information. Patient Education        10 Things to Do When You Have COVID-19      Talk to your doctor about treatment. They might have you take medicine to help prevent serious illness. Stay home. Don't go to school, work, or public areas. And don't use public transportation, ride-shares, or taxis unless you have no choice. Leave your home only if you need to get medical care. But call the doctor's office firstso they know you're coming. And wear a mask. Ask before leaving isolation. Follow your doctor's advice about when it is safe for you to leave isolation. Wear a mask when you are around other people. It can help stop the spread of the virus. Limit contact with people in your home. If possible, stay in a separate bedroom and use a separate bathroom. Cover your mouth and nose with a tissue when you cough or sneeze. Then throw the tissue in the trash right away. Wash your hands often, especially after you cough or sneeze. Use soap and water, and scrub for at least 20 seconds. If soap and wateraren't available, use an alcohol-based hand . Don't share personal household items. These include bedding, towels, cups and glasses, and eating utensils. Clean and disinfect your home every day. Use household  or disinfectant wipes or sprays.      If

## 2022-05-24 ENCOUNTER — TELEPHONE (OUTPATIENT)
Dept: FAMILY MEDICINE CLINIC | Age: 62
End: 2022-05-24

## 2022-05-24 NOTE — TELEPHONE ENCOUNTER
Pt states that she has a cough and when she started taking the medication (Paxlovid) she had an awful taste in her mouth. Pt is concerned about all the medication she has to take. She was very dizzy when she got up in the middle of the night. Denies any shortness of breath, chest pains or fever. Advised patient to give us a call if her symptoms happen to get worse.

## 2022-05-24 NOTE — TELEPHONE ENCOUNTER
----- Message from Magdaleno Lam DO sent at 5/24/2022  6:57 AM EDT -----  Please f/u with pt and see how they are doing with their covid symptoms  Let me know, thanks!

## 2022-05-25 ENCOUNTER — TELEPHONE (OUTPATIENT)
Dept: FAMILY MEDICINE CLINIC | Age: 62
End: 2022-05-25

## 2022-05-25 NOTE — TELEPHONE ENCOUNTER
Pt states she is feeling about the same, hoariness is new started today, no other c/o's    No SOB, or fever

## 2022-05-25 NOTE — TELEPHONE ENCOUNTER
----- Message from Rennie Rinne, DO sent at 5/25/2022  6:42 AM EDT -----  Please f/u with pt and see how they are doing with their covid symptoms  Let me know, thanks!

## 2022-05-27 ENCOUNTER — APPOINTMENT (OUTPATIENT)
Dept: PHYSICAL THERAPY | Age: 62
End: 2022-05-27
Payer: MEDICARE

## 2022-05-31 ENCOUNTER — HOSPITAL ENCOUNTER (OUTPATIENT)
Dept: PHYSICAL THERAPY | Age: 62
Setting detail: THERAPIES SERIES
Discharge: HOME | End: 2022-05-31
Payer: MEDICARE

## 2022-05-31 PROCEDURE — 97110 THERAPEUTIC EXERCISES: CPT

## 2022-05-31 PROCEDURE — 97035 APP MDLTY 1+ULTRASOUND EA 15: CPT

## 2022-05-31 NOTE — PROGRESS NOTES
[address] [facility]  PHYSICAL THERAPY  [] EVALUATION  [x] DAILY NOTE (LAND) [] DAILY NOTE (AQUATIC ) [] PROGRESS NOTE [] DISCHARGE NOTE    [x] OUTPATIENT REHABILITATION [facility]   [] [place] [address]    [] [facility]    [] [place]    Date: 2022  Patient Name:  Aguila Mejia  : 1960  MRN: [id number]  CSN: [id number]    Referring Practitioner Mark Holguin MD   Diagnosis Trochanteric bursitis, right hip [M70.61]  Trochanteric bursitis, left hip [M70.62]    Treatment Diagnosis Chronic B hip pain, core and hip weakness, tight hip flexors and piriformis   Date of Evaluation 22    Additional Pertinent History B TKA , HTN, diabetes, anxiety/depression, arthritis- hips, low back, knees. Functional Outcome Measure Used LEFS   Functional Outcome Score 43/80 (22)       Insurance: Primary: Payor: Vladimir Olivares /  /  / ,   Secondary:    Authorization Information: PRECERTIFICATION REQUIRED:  n/a  INSURANCE THERAPY BENEFIT:  Allowed 30 visits Physical Therapy /Occupational Therapy/Speech Therapy per calendar year. FCE-Covered, no precert required. Benefit will not cover maintenance or preventative treatment. AQUATIC THERAPY COVERED:   Yes  MODALITIES COVERED:  Yes. Iontophoresis and Hot/Cold Packs are not covered. TELEHEALTH COVERED: Yes   Visit # 13, 3/10 for progress note   Visits Allowed: 30   Recertification Date:    Physician Follow-Up:    Physician Orders:    History of Present Illness: Pt presents with B hip pain, L>R. Pt notes arthritis in hips for years for pain worsened in 2022. Pt was lying on heat pads then got to a point where it would allow her to sleep. Sitting is most uncomfortable. Pain aggravates with standing, small movements, work duties, stairs. Walking provides most relief. XRs of hips looked good with good spacing between bones so not a candidate for CAMMY.       SUBJECTIVE: Pt reports having to cancel last week due to having COVID. Was laying or sitting for most of that time which resulted in more hip soreness. Reports B hips are feeling better now, about a 2/10 today with L more sore than R.       OBJECTIVE:  TREATMENT   Precautions:    Pain: 2/10 L hip with activity, 0/10 at rest    X in shaded column indicates activity completed today   Modalities Parameters/  Location  Notes   Phono with hydrocortisone cream B lateral hip - 1.0 MHz, 1.0 w/cm2 5 mins each hip x                Manual Therapy Time/Technique  Notes   STM IT band with tiger tail B 8 min                 Exercise/Intervention   Notes   Hooklying piriformis IR stretch 2x20 sec R  4x20 sec L  x    Figure 4 piriformis stretch 2x20 sec    Right only, left painful so held   LTR 10x5 sec  x    Hip flexor stretch off EOB 3x20 sec  x    Posterior pelvic tilt 15*x5 sec  x    + hip ADD ball squeeze 15*x5 sec      + knee falls out- bilat and unilat with tband 12x5 sec ea Orange* x    + modified deadbug 15      + bridge 15*x5 sec  x    SL hip ABD    Attempted on left but caused intense hip pain   Clamshell 10x5 sec  x Pain started at rep 6   Reverse chamshell 10x5 sec      SKTC/DTKC  x3 20 sec     sidelying IT band stretch x3 20 sec x    Hamstring 90/90 stretch with towel 3x20 sec  x    Nustep  5 min Level 2     3 way hip x10   No hip flexion   Marching, HS curls, HR/TR x10   Notes more pain in knees   Mini squats x10   Cues for technique   Seated piriformis  x3 20 sec     Step ups: forward, lateral 10 4 inch     KT to bilat hips Star pattern- 85% tension   x  B hips     Specific Interventions Next Treatment: ultrasound to both hips, core and hip strengthening    Activity/Treatment Tolerance:  [x]  Patient tolerated treatment well  []  Patient limited by fatigue  []  Patient limited by pain   []  Patient limited by medical complications  []  Other:     Assessment: Continued with therex, US and KT to B hips this session.  Noted increased L hip soreness with therex, especially during clamshells and in L sidelying. Demonstrates good technique of all interventions. GOALS:  Patient Goal: Pain free    Short Term Goals: defer to LTGs     Long Term Goals: 8 weeks  Patient will demonstrate good core engagement without anterior pelvic tilt during therapy session without cues  to carry over to lifting and moving around at work. Patient will deny left piriformis tightness for symptom control when sitting. 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 Patient will be independent and compliant with HEP to achieve above goals. Patient Education:   [x]  HEP/Education Completed: Continue HEP   [address] Access Code: [email or web address]  []  No new Education completed  [x]  Reviewed Prior HEP      [x]  Patient verbalized and/or demonstrated understanding of education provided. []  Patient unable to verbalize and/or demonstrate understanding of education provided. Will continue education. []  Barriers to learning:     PLAN:  Treatment Recommendations: Strengthening, Range of Motion, Manual Therapy - Soft Tissue Mobilization, Pain Management, Home Exercise Program, Patient Education, Integrative Dry Needling, Aquatics and Modalities    []  Plan of care initiated. Plan to see patient 2 times per week for 8 weeks to address the treatment planned outlined above.   [x]  Continue with current plan of care   []  Modify plan of care as follows:    []  Hold pending physician visit  []  Discharge    Time In 1018   Time Out 1108   Timed Code Minutes: 50   Total Treatment Time: 50     Electronically Signed by: Rayo Leon PT

## 2022-06-03 ENCOUNTER — HOSPITAL ENCOUNTER (OUTPATIENT)
Dept: PHYSICAL THERAPY | Age: 62
Setting detail: THERAPIES SERIES
Discharge: HOME | End: 2022-06-03
Payer: MEDICARE

## 2022-06-03 PROCEDURE — 97110 THERAPEUTIC EXERCISES: CPT

## 2022-06-03 NOTE — DISCHARGE SUMMARY
[address] [facility]  PHYSICAL THERAPY  [] EVALUATION  [] DAILY NOTE (LAND) [] DAILY NOTE (AQUATIC ) [] PROGRESS NOTE [x] DISCHARGE NOTE    [x] OUTPATIENT REHABILITATION [facility]   [] [place] [address]    [] [facility]    [] [place]    Date: 6/3/2022  Patient Name:  Roberto Ruano  : 1960  MRN: [id number]  CSN: [id number]    Referring Practitioner Khurram Mims MD   Diagnosis Trochanteric bursitis, right hip [M70.61]  Trochanteric bursitis, left hip [M70.62]    Treatment Diagnosis Chronic B hip pain, core and hip weakness, tight hip flexors and piriformis   Date of Evaluation 22    Additional Pertinent History B TKA , HTN, diabetes, anxiety/depression, arthritis- hips, low back, knees. Functional Outcome Measure Used LEFS   Functional Outcome Score 43/80 (22) 43/80 (6/3/22)      Insurance: Primary: Payor: Arlington Osler /  /  / ,   Secondary:    Authorization Information: PRECERTIFICATION REQUIRED:  n/a  INSURANCE THERAPY BENEFIT:  Allowed 30 visits Physical Therapy /Occupational Therapy/Speech Therapy per calendar year. FCE-Covered, no precert required. Benefit will not cover maintenance or preventative treatment. AQUATIC THERAPY COVERED:   Yes  MODALITIES COVERED:  Yes. Iontophoresis and Hot/Cold Packs are not covered. TELEHEALTH COVERED: Yes   Visit # 14, 4/10 for progress note   Visits Allowed: 30   Recertification Date: 12   Physician Follow-Up:    Physician Orders:    History of Present Illness: Pt presents with B hip pain, L>R. Pt notes arthritis in hips for years for pain worsened in 2022. Pt was lying on heat pads then got to a point where it would allow her to sleep. Sitting is most uncomfortable. Pain aggravates with standing, small movements, work duties, stairs. Walking provides most relief. XRs of hips looked good with good spacing between bones so not a candidate for CAMMY.       SUBJECTIVE: Pt reports hip felt good after rest for 10 days with Covid but then returned to work and pain returned. Pt had to go straight home and ice hips a couple days after returning to work. Pt continues to do HEP. OBJECTIVE:  TREATMENT   Precautions:    Pain: 8/10 B hips at worst with work recently, 0/10 at rest    X in shaded column indicates activity completed today   Modalities Parameters/  Location  Notes   Phono with hydrocortisone cream B lateral hip - 1.0 MHz, 1.0 w/cm2 5 mins each hip                 Manual Therapy Time/Technique  Notes   STM IT band with tiger tail B 8 min                 Exercise/Intervention   Notes   Hooklying piriformis IR stretch 2x20 sec R  4x20 sec L      Figure 4 piriformis stretch 2x20 sec    Right only, left painful so held   LTR 10x5 sec      Hip flexor stretch off EOB 3x20 sec      Posterior pelvic tilt 15x5 sec      + hip ADD ball squeeze 15x5 sec  x    + knee falls out- bilat and unilat with tband 15x5 sec ea Orange x    + modified deadbug 15      + bridge 15x5 sec  x    SL hip ABD    Attempted on left but caused intense hip pain   Clamshell 10x5 sec   Pain started at rep 6   Reverse chamshell 10x5 sec      SKTC/DTKC  x3 20 sec     sidelying IT band stretch x3 20 sec x    Hamstring 90/90 stretch with towel 3x20 sec      Nustep  5 min Level 2     3 way hip x10   No hip flexion   Marching, HS curls, HR/TR x10   Notes more pain in knees   Mini squats x10   Cues for technique   Seated piriformis  x3 20 sec     Step ups: forward, lateral 10 4 inch     KT to bilat hips Star pattern- 85% tension   x  B hips     Specific Interventions Next Treatment: ultrasound to both hips, core and hip strengthening    Activity/Treatment Tolerance:  [x]  Patient tolerated treatment well  []  Patient limited by fatigue  []  Patient limited by pain   []  Patient limited by medical complications  []  Other:     Assessment: Pt demos good progress toward goals.  Minimal piriformis tightness noted with normal right Mobilization, Pain Management, Home Exercise Program, Patient Education, Integrative Dry Needling, Aquatics and Modalities    []  Plan of care initiated. Plan to see patient 2 times per week for 8 weeks to address the treatment planned outlined above.   []  Continue with current plan of care   []  Modify plan of care as follows:    []  Hold pending physician visit  [x]  Discharge    Time In 1000   Time Out 1040   Timed Code Minutes: 40   Total Treatment Time: 40     Electronically Signed by: Mirta Haney, PT

## 2022-06-06 ENCOUNTER — APPOINTMENT (OUTPATIENT)
Dept: PHYSICAL THERAPY | Age: 62
End: 2022-06-06
Payer: MEDICARE

## 2022-06-10 ENCOUNTER — APPOINTMENT (OUTPATIENT)
Dept: PHYSICAL THERAPY | Age: 62
End: 2022-06-10
Payer: MEDICARE

## 2022-07-08 ENCOUNTER — TELEPHONE (OUTPATIENT)
Dept: FAMILY MEDICINE CLINIC | Age: 62
End: 2022-07-08

## 2022-07-08 DIAGNOSIS — E11.9 TYPE 2 DIABETES MELLITUS WITHOUT COMPLICATION, WITHOUT LONG-TERM CURRENT USE OF INSULIN (HCC): Primary | ICD-10-CM

## 2022-07-08 NOTE — TELEPHONE ENCOUNTER
Pt due for fasting labs prior to next apt on 8/8/2022. Please call to have pt complete this. Thanks! ASSESSMENT & PLAN   Diagnosis Orders   1.  Type 2 diabetes mellitus without complication, without long-term current use of insulin ([facility] [address]  CBC with Auto Differential    Comprehensive Metabolic Panel    Lipid Panel    Microalbumin / Creatinine Urine Ratio    TSH with Reflex     Future Appointments   Date Time Provider Tana Gracia   8/8/2022 10:20 AM [address]   10/13/2022  1:30  [facility] [address], MD [address] [facility] [address] [facility]

## 2022-07-28 LAB
ALBUMIN SERPL-MCNC: 4.7 G/DL
ALP BLD-CCNC: 61 U/L
ALT SERPL-CCNC: 19 U/L
ANION GAP SERPL CALCULATED.3IONS-SCNC: 7 MMOL/L
AST SERPL-CCNC: 22 U/L
BASOPHILS ABSOLUTE: NORMAL
BASOPHILS RELATIVE PERCENT: NORMAL
BILIRUB SERPL-MCNC: 0.4 MG/DL (ref 0.1–1.4)
BUN BLDV-MCNC: 14 MG/DL
CALCIUM SERPL-MCNC: 9.3 MG/DL
CHLORIDE BLD-SCNC: 105 MMOL/L
CHOLESTEROL, TOTAL: 193 MG/DL
CHOLESTEROL/HDL RATIO: NORMAL
CO2: 30 MMOL/L
CREAT SERPL-MCNC: 0.82 MG/DL
EOSINOPHILS ABSOLUTE: NORMAL
EOSINOPHILS RELATIVE PERCENT: NORMAL
GFR CALCULATED: 80
GLUCOSE BLD-MCNC: 96 MG/DL
HCT VFR BLD CALC: 38.6 % (ref 36–46)
HDLC SERPL-MCNC: 70 MG/DL (ref 35–70)
HEMOGLOBIN: 12.9 G/DL (ref 12–16)
LDL CHOLESTEROL CALCULATED: 91 MG/DL (ref 0–160)
LYMPHOCYTES ABSOLUTE: NORMAL
LYMPHOCYTES RELATIVE PERCENT: NORMAL
MCH RBC QN AUTO: NORMAL PG
MCHC RBC AUTO-ENTMCNC: NORMAL G/DL
MCV RBC AUTO: NORMAL FL
MONOCYTES ABSOLUTE: NORMAL
MONOCYTES RELATIVE PERCENT: NORMAL
NEUTROPHILS ABSOLUTE: NORMAL
NEUTROPHILS RELATIVE PERCENT: NORMAL
NONHDLC SERPL-MCNC: NORMAL MG/DL
PLATELET # BLD: 273 K/ΜL
PMV BLD AUTO: NORMAL FL
POTASSIUM SERPL-SCNC: 5.3 MMOL/L
RBC # BLD: 4.63 10^6/ΜL
SODIUM BLD-SCNC: 142 MMOL/L
TOTAL PROTEIN: 6.4
TRIGL SERPL-MCNC: 158 MG/DL
VLDLC SERPL CALC-MCNC: 32 MG/DL
WBC # BLD: 4.3 10^3/ML

## 2022-07-29 LAB
ABSOLUTE BASO #: 0.01 K/UL (ref 0–0.2)
ABSOLUTE EOS #: 0.06 K/UL (ref 0–0.5)
ABSOLUTE LYMPH #: 1.78 K/UL (ref 1–4)
ABSOLUTE MONO #: 0.36 K/UL (ref 0.2–1)
ABSOLUTE NEUT #: 2.12 K/UL (ref 1.5–7.5)
ALBUMIN SERPL-MCNC: 4.7 G/DL (ref 3.5–5.2)
ALK PHOSPHATASE: 61 U/L (ref 40–140)
ALT SERPL-CCNC: 19 U/L (ref 5–40)
ANION GAP SERPL CALCULATED.3IONS-SCNC: 7 MEQ/L (ref 10–19)
AST SERPL-CCNC: 22 U/L (ref 9–40)
BASOPHILS RELATIVE PERCENT: 0.2 %
BILIRUB SERPL-MCNC: 0.4 MG/DL
BUN BLDV-MCNC: 14 MG/DL (ref 8–23)
CALCIUM SERPL-MCNC: 9.3 MG/DL (ref 8.5–10.5)
CHLORIDE BLD-SCNC: 105 MEQ/L (ref 95–107)
CHOLESTEROL/HDL RATIO: 2.8 RATIO
CHOLESTEROL: 193 MG/DL
CO2: 30 MEQ/L (ref 19–31)
CREAT SERPL-MCNC: 0.82 MG/DL (ref 0.6–1.3)
CREATINE, URINE: 103.9 MG/DL
EGFR IF NONAFRICAN AMERICAN: 80 ML/MIN/1.73
EOSINOPHILS RELATIVE PERCENT: 1.4 %
GLUCOSE: 96 MG/DL (ref 70–99)
HCT VFR BLD CALC: 38.6 % (ref 34–45)
HDLC SERPL-MCNC: 70 MG/DL
HEMOGLOBIN: 12.9 G/DL (ref 11.5–15.5)
LDL CHOLESTEROL CALCULATED: 91 MG/DL
LDL/HDL RATIO: 1.3 RATIO
LYMPHOCYTE %: 41 %
MCH RBC QN AUTO: 27.9 PG (ref 25–33)
MCHC RBC AUTO-ENTMCNC: 33.4 G/DL (ref 31–36)
MCV RBC AUTO: 83.4 FL (ref 80–99)
MICROALBUMIN/CREAT 24H UR: <12 MG/DL
MONOCYTES # BLD: 8.3 %
NEUTROPHILS RELATIVE PERCENT: 48.9 %
PDW BLD-RTO: 12.9 % (ref 11.5–15)
PLATELETS: 273 K/UL (ref 130–400)
PMV BLD AUTO: 10.8 FL (ref 9.3–13)
POTASSIUM SERPL-SCNC: 5.3 MEQ/L (ref 3.5–5.4)
RBC: 4.63 M/UL (ref 3.8–5.4)
SODIUM BLD-SCNC: 142 MEQ/L (ref 133–146)
T4 FREE: 0.97 NG/DL (ref 0.8–1.9)
TOTAL PROTEIN: 6.4 G/DL (ref 6.1–8.3)
TRIGL SERPL-MCNC: 158 MG/DL
TSH SERPL DL<=0.05 MIU/L-ACNC: 5.41 UIU/ML (ref 0.4–4.1)
VLDLC SERPL CALC-MCNC: 32 MG/DL
WBC: 4.3 K/UL (ref 3.5–11)

## 2022-08-04 PROBLEM — I87.2 CHRONIC VENOUS INSUFFICIENCY OF LOWER EXTREMITY: Chronic | Status: ACTIVE | Noted: 2021-08-05

## 2022-08-05 NOTE — PROGRESS NOTES
Chief Complaint   Patient presents with    Follow-up     6 mo f/u for diabetes. Sinus Problem    Eye Problem     History obtained from the patient. SUBJECTIVE:  Bonita Leal is a 60 y.o. female that presents today     -DM2: Dx end of May 2021  startd on metformin 500mg bid  A1c stable at 6.0      -HTN:    HPI:    Taking meds as prescribed ?: yes  Tolerating well ?: yes  Side Effects ?: denies  BP at home ?: <140/90  Working on TLCS ?: yes  Chest Pain/SOB/Palpitations? denies    BP Readings from Last 3 Encounters:   08/08/22 128/68   05/23/22 (!) 104/58   02/21/22 124/62       -HLD:    HPI:  Intolerant of statins  On zetia and Praluent    Taking meds as prescribed ?: yes  Tolerating well ?: yes  Side Effects ?: denies  Muscle Pain?: denies  Working on TL[employer] ?: yes      -Surgical menopause: On premarin  Unable to come off completely d/t hot flashes  On lowest dose      -URI type sxs:   2+ wks  Nasal congestion  Drainage  Cough  Sore throat  No fever  No SOB    Fever - No  Runny nose or congestion -  Yes   Cough -  Yes  Sore throat -  Yes  Headache, fatigue, joint pains, muscle aches -  No  Double Sickening - yes  Shortness of breath/Wheezing? -  No  Nausea/Vomiting/Diarrhea?   No  Sick contacts - No  Maxillary Tooth Pain -  Yes  Treatment tried and response - otc meds, no better      -Eye problems:  Lower eye lids, R more than L get red the last year  Comes and goes  Mild sxs right now  No issues with eyes  No vision changes      Age/Gender Health Maintenance    Lipid -   Lab Results   Component Value Date    CHOL 196 10/07/2021    CHOL 245 05/12/2021    CHOL 239 09/22/2020     Lab Results   Component Value Date    TRIG 122 10/07/2021    TRIG 217 05/12/2021    TRIG 317 09/22/2020     Lab Results   Component Value Date    HDL 61 10/07/2021    HDL 59 05/12/2021    HDL 58 09/22/2020     Lab Results   Component Value Date    LDLCALC 113 10/07/2021    LDLCALC 147 05/12/2021    LDLCALC 125 09/22/2020     Lab (GLUCOPHAGE) 500 MG tablet Take 1 tablet by mouth 2 times daily (with meals) 180 tablet 3    diphenhydrAMINE (BENADRYL) 25 MG tablet Take 25 mg by mouth every 6 hours as needed for Itching      loratadine (CLARITIN) 10 MG tablet Take 10 mg by mouth daily      betamethasone valerate (VALISONE) 0.1 % lotion Apply topically 2 times daily. 60 mL 0    Omega-3 Fatty Acids (FISH OIL) 1000 MG CAPS Take 2,000 mg by mouth daily      cyclobenzaprine (FLEXERIL) 10 MG tablet Take 1 tablet by mouth 3 times daily as needed for Muscle spasms 90 tablet 0    meloxicam (MOBIC) 15 MG tablet TAKE 1 TABLET BY MOUTH EVERY DAY FOR 30 DAYS       No current facility-administered medications for this visit. Orders Placed This Encounter   Medications    amoxicillin-clavulanate (AUGMENTIN) 875-125 MG per tablet     Sig: Take 1 tablet by mouth in the morning and 1 tablet before bedtime. Do all this for 10 days. Dispense:  20 tablet     Refill:  0           All medications reviewed and reconciled, including OTC and herbal medications. Updated list given to patient. Patient Active Problem List    Diagnosis Date Noted    DM2 (diabetes mellitus, type 2) ([facility] [address]     Obesity (BMI 30-39.9)     Epidermal cyst of face 2021    Bilateral temporomandibular joint pain 2021    Chronic venous insufficiency of lower extremity 2021    Venous stasis dermatitis of both lower extremities 2021    Hypertension, essential     Surgical menopause     Statin intolerance      LFTS      Dyslipidemia        Past Medical History:   Diagnosis Date    Chronic venous insufficiency of lower extremity 2021    DM2 (diabetes mellitus, type 2) ([facility])     Dyslipidemia     Hypertension, essential     Obesity (BMI 30-39. 9)     Statin intolerance     LFTS    Surgical menopause        Past Surgical History:   Procedure Laterality Date     SECTION       x 3     SECTION      x3    CHOLECYSTECTOMY      INTRACAPSULAR CATARACT EXTRACTION Left 10/13/2020    EXTRACAPSULAR CATARACT REMOVAL WITH INSERTION OF INTRAOCULAR LENS PROTHESIS LEFT performed by Joshua Acevedo MD at [address] [facility] Bilateral     knees    JEANINE AND BSO (CERVIX REMOVED)  04/1986    Endometriosis. Allergies   Allergen Reactions    Statins      Liver issues    Valium [Diazepam] Other (See Comments)     Patient gets very irritated       Social History     Tobacco Use    Smoking status: Never    Smokeless tobacco: Never   Substance Use Topics    Alcohol use: No       Family History   Problem Relation Age of Onset    Diabetes Mother     High Blood Pressure Father     Heart Disease Father     Breast Cancer Other         1/2 sister    Colon Cancer Neg Hx          I have reviewed the patient's past medical history, past surgical history, allergies, medications, social and family history and I have made updates where appropriate.       Review of Systems  Positive responses are highlighted in bold    Constitutional:  Fever, Chills, Night Sweats, Fatigue, Unexpected changes in weight  HENT:  Ear pain, Tinnitus, Nosebleeds, Trouble swallowing, Hearing loss, Sore throat  Cardiovascular:  Chest Pain, Palpitations, Orthopnea, Paroxysmal Nocturnal Dyspnea  Respiratory:  Cough, Wheezing, Shortness of breath, Chest tightness, Apnea  Gastrointestinal:  Nausea, Vomiting, Diarrhea, Constipation, Heartburn, Blood in stool  Genitourinary:  Difficulty or painful urination, Flank pain, Change in frequency, Urgency  Skin:  Color change, Rash, Itching, Wound  Musculoskeletal:  Joint pain, Back pain, Gait problems, Joint swelling, Myalgias  Neurological:  Dizziness, Headaches, Presyncope, Numbness, Seizures, Tremors  Endocrine:  Heat Intolerance, Cold Intolerance, Polydipsia, Polyphagia, Polyuria      PHYSICAL EXAM:  Vitals:    08/08/22 1025   BP: 128/68   Site: Right Upper Arm   Position: Sitting   Cuff Size: Small Adult   Pulse: 87   Resp: 16   Temp: 98 °F (36.7 °C)   SpO2: 97%   Weight: 174 lb (78.9 kg)   Height: 5' 2\" (1.575 m)     Body mass index is 31.83 kg/m². VS Reviewed  General Appearance: A&O x 3, No acute distress,well developed and well- nourished  Eyes: pupils equal, round, and reactive to light, extraocular eye movements intact, conjunctivae and eye lids without erythema  ENT: bilateral TM normal without fluid or infection, neck without nodes, throat normal without erythema or exudate, sinuses nontender, post nasal drip noted, nasal mucosa congested, and Oropharynx clear, without erythema, exudate, or thrush. Neck: supple and non-tender without mass, no thyromegaly or thyroid nodules, no cervical lymphadenopathy  Pulmonary/Chest: clear to auscultation bilaterally- no wheezes, rales or rhonchi, normal air movement, no respiratory distress or retractions  Cardiovascular: S1 and S2 auscultated w/ RRR. No murmurs, rubs, clicks, or gallops, distal pulses intact. Abdomen: soft, non-tender, non-distended, bowel sounds physiologic,  no rebound or guarding, no masses or hernias noted. Liver and spleen without enlargement. Extremities: no cyanosis, clubbing or edema of the lower extremities. Skin: warm and dry, no rash or erythema  Foot: Bilat 2+DP/PT bilaterally. Skin warm, dry and intact. Sensation normal throughout to touch and with monofilament. No results found for this visit on 08/08/22. ASSESSMENT & PLAN  1. Type 2 diabetes mellitus without complication, without long-term current use of insulin (HCC)    Stable  con't metformin  F/u 6 months    -  DIABETES FOOT EXAM    2. Hypertension, essential    Stable  Con't Hyzaar    3. Dyslipidemia    Stable   Intolerant of statins, causes liver issues per pt  con't zetia and Praluent    4. Statin intolerance      5. Surgical menopause    Stable  con't estrogen  Unable to wean past 0.3mg previously    6. Vasomotor symptoms due to menopause    As above    7.  Acute rhinosinusitis    F/u if no

## 2022-08-08 ENCOUNTER — OFFICE VISIT (OUTPATIENT)
Dept: FAMILY MEDICINE CLINIC | Age: 62
End: 2022-08-08
Payer: MEDICARE

## 2022-08-08 VITALS
SYSTOLIC BLOOD PRESSURE: 128 MMHG | HEART RATE: 87 BPM | BODY MASS INDEX: 32.02 KG/M2 | OXYGEN SATURATION: 97 % | HEIGHT: 62 IN | DIASTOLIC BLOOD PRESSURE: 68 MMHG | TEMPERATURE: 98 F | RESPIRATION RATE: 16 BRPM | WEIGHT: 174 LBS

## 2022-08-08 DIAGNOSIS — E11.9 TYPE 2 DIABETES MELLITUS WITHOUT COMPLICATION, WITHOUT LONG-TERM CURRENT USE OF INSULIN (HCC): Primary | ICD-10-CM

## 2022-08-08 DIAGNOSIS — E78.5 DYSLIPIDEMIA: ICD-10-CM

## 2022-08-08 DIAGNOSIS — H01.022 SQUAMOUS BLEPHARITIS OF LOWER EYELIDS OF BOTH EYES: ICD-10-CM

## 2022-08-08 DIAGNOSIS — J01.90 ACUTE RHINOSINUSITIS: ICD-10-CM

## 2022-08-08 DIAGNOSIS — I10 HYPERTENSION, ESSENTIAL: ICD-10-CM

## 2022-08-08 DIAGNOSIS — H01.025 SQUAMOUS BLEPHARITIS OF LOWER EYELIDS OF BOTH EYES: ICD-10-CM

## 2022-08-08 DIAGNOSIS — E89.40 SURGICAL MENOPAUSE: ICD-10-CM

## 2022-08-08 DIAGNOSIS — Z78.9 STATIN INTOLERANCE: ICD-10-CM

## 2022-08-08 DIAGNOSIS — N95.1 VASOMOTOR SYMPTOMS DUE TO MENOPAUSE: ICD-10-CM

## 2022-08-08 DIAGNOSIS — Z23 NEED FOR SHINGLES VACCINE: ICD-10-CM

## 2022-08-08 DIAGNOSIS — M62.830 BACK MUSCLE SPASM: Primary | ICD-10-CM

## 2022-08-08 PROCEDURE — G8427 DOCREV CUR MEDS BY ELIG CLIN: HCPCS | Performed by: FAMILY MEDICINE

## 2022-08-08 PROCEDURE — 2022F DILAT RTA XM EVC RTNOPTHY: CPT | Performed by: FAMILY MEDICINE

## 2022-08-08 PROCEDURE — 99214 OFFICE O/P EST MOD 30 MIN: CPT | Performed by: FAMILY MEDICINE

## 2022-08-08 PROCEDURE — G8417 CALC BMI ABV UP PARAM F/U: HCPCS | Performed by: FAMILY MEDICINE

## 2022-08-08 PROCEDURE — 3017F COLORECTAL CA SCREEN DOC REV: CPT | Performed by: FAMILY MEDICINE

## 2022-08-08 PROCEDURE — 90471 IMMUNIZATION ADMIN: CPT | Performed by: FAMILY MEDICINE

## 2022-08-08 PROCEDURE — 1036F TOBACCO NON-USER: CPT | Performed by: FAMILY MEDICINE

## 2022-08-08 PROCEDURE — 90750 HZV VACC RECOMBINANT IM: CPT | Performed by: FAMILY MEDICINE

## 2022-08-08 PROCEDURE — 3044F HG A1C LEVEL LT 7.0%: CPT | Performed by: FAMILY MEDICINE

## 2022-08-08 RX ORDER — CYCLOBENZAPRINE HCL 10 MG
10 TABLET ORAL 3 TIMES DAILY PRN
Qty: 90 TABLET | Refills: 0 | Status: SHIPPED | OUTPATIENT
Start: 2022-08-08

## 2022-08-08 RX ORDER — AMOXICILLIN AND CLAVULANATE POTASSIUM 875; 125 MG/1; MG/1
1 TABLET, FILM COATED ORAL 2 TIMES DAILY
Qty: 20 TABLET | Refills: 0 | Status: SHIPPED | OUTPATIENT
Start: 2022-08-08 | End: 2022-08-18

## 2022-08-08 RX ORDER — CYCLOBENZAPRINE HCL 10 MG
10 TABLET ORAL 3 TIMES DAILY PRN
Status: CANCELLED | OUTPATIENT
Start: 2022-08-08

## 2022-08-08 ASSESSMENT — PATIENT HEALTH QUESTIONNAIRE - PHQ9
SUM OF ALL RESPONSES TO PHQ QUESTIONS 1-9: 0
SUM OF ALL RESPONSES TO PHQ QUESTIONS 1-9: 0
2. FEELING DOWN, DEPRESSED OR HOPELESS: 0
SUM OF ALL RESPONSES TO PHQ9 QUESTIONS 1 & 2: 0
1. LITTLE INTEREST OR PLEASURE IN DOING THINGS: 0
SUM OF ALL RESPONSES TO PHQ QUESTIONS 1-9: 0
SUM OF ALL RESPONSES TO PHQ QUESTIONS 1-9: 0

## 2022-08-09 DIAGNOSIS — R79.89 ELEVATED TSH: Primary | ICD-10-CM

## 2022-08-10 ENCOUNTER — TELEPHONE (OUTPATIENT)
Dept: FAMILY MEDICINE CLINIC | Age: 62
End: 2022-08-10

## 2022-08-10 NOTE — TELEPHONE ENCOUNTER
----- Message from Ishaan Strauss, DO sent at 8/9/2022  6:20 PM EDT -----  Please let pt know that labs WNL other than thyroid MAY be a bit underactive  I've ordered some f/u labs to look at this further  Recommend be done in 4 wks. Non-fasting is fine  Let me know if questions, thanks!

## 2022-08-10 NOTE — TELEPHONE ENCOUNTER
Patient informed and verbalized understanding. No questions at this time.  Labs mailed to given address

## 2022-08-15 DIAGNOSIS — E11.59 TYPE 2 DIABETES MELLITUS WITH OTHER CIRCULATORY COMPLICATION, WITHOUT LONG-TERM CURRENT USE OF INSULIN (HCC): ICD-10-CM

## 2022-08-15 DIAGNOSIS — E78.5 HYPERLIPIDEMIA, UNSPECIFIED HYPERLIPIDEMIA TYPE: ICD-10-CM

## 2022-08-16 RX ORDER — ALIROCUMAB 75 MG/ML
75 INJECTION, SOLUTION SUBCUTANEOUS
Qty: 6 PEN | Refills: 3 | Status: SHIPPED | OUTPATIENT
Start: 2022-08-16

## 2022-08-21 DIAGNOSIS — E11.9 CONTROLLED TYPE 2 DIABETES MELLITUS WITHOUT COMPLICATION, WITHOUT LONG-TERM CURRENT USE OF INSULIN (HCC): ICD-10-CM

## 2022-08-22 NOTE — TELEPHONE ENCOUNTER
Recent Visits  Date Type Provider Dept   08/08/22 Office Visit Gavino Balbuena, DO [facility]   05/23/22 Office Visit Gavino Balbuena, DO [facility]   02/21/22 Office Visit Gavino Balbuena, DO [facility]   02/08/22 Office Visit Gavino Balbuena, DO [facility]   12/20/21 Office Visit Gavino Balbuena, DO [facility]   11/05/21 Office Visit Gavino Balbuena, DO [facility]   08/05/21 Office Visit Gavino Balbuena, DO [facility]   Showing recent visits within past 540 days with a meds authorizing provider and meeting all other requirements  Future Appointments  No visits were found meeting these conditions.   Showing future appointments within next 150 days with a meds authorizing provider and meeting all other requirements     Future Appointments   Date Time Provider Tana Gracia   10/13/2022  1:30 PM Vasile Madsen MD [facility]   2/9/2023 11:00 AM Gavino Balbuena, DO [address] [place] [address]

## 2022-08-29 ENCOUNTER — TELEPHONE (OUTPATIENT)
Dept: FAMILY MEDICINE CLINIC | Age: 62
End: 2022-08-29

## 2022-08-29 NOTE — TELEPHONE ENCOUNTER
----- Message from Ishaan Strauss DO sent at 8/27/2022  7:20 AM EDT -----  Please let pt know that mammogram is normal. Let me know if questions, thanks!

## 2022-09-04 DIAGNOSIS — E78.5 HYPERLIPIDEMIA, UNSPECIFIED HYPERLIPIDEMIA TYPE: ICD-10-CM

## 2022-09-06 RX ORDER — EZETIMIBE 10 MG/1
TABLET ORAL
Qty: 90 TABLET | Refills: 3 | Status: SHIPPED | OUTPATIENT
Start: 2022-09-06

## 2022-09-06 NOTE — TELEPHONE ENCOUNTER
Recent Visits  Date Type Provider Dept   08/08/22 Office Visit Gavino Balbuena, DO [facility]   05/23/22 Office Visit Gavino Balbuena, DO [facility]   02/21/22 Office Visit Gavino Balbuena, DO [facility]   02/08/22 Office Visit Gavino Balbuena, DO [facility]   12/20/21 Office Visit Gavino Balbuena, DO [facility]   11/05/21 Office Visit Gavino Balbuena, DO [facility]   08/05/21 Office Visit Gavino Balbuena, DO [facility]   Showing recent visits within past 540 days with a meds authorizing provider and meeting all other requirements  Future Appointments  No visits were found meeting these conditions.   Showing future appointments within next 150 days with a meds authorizing provider and meeting all other requirements      Future Appointments   Date Time Provider Tana Gracia   10/13/2022  1:30 PM Vasile Madsen MD [facility]   2/9/2023 11:00 AM Gavino Balbuena, DO [address] [place] [address]

## 2022-09-12 ENCOUNTER — TELEPHONE (OUTPATIENT)
Dept: CARDIOLOGY CLINIC | Age: 62
End: 2022-09-12

## 2022-09-12 DIAGNOSIS — E78.5 HYPERLIPIDEMIA, UNSPECIFIED HYPERLIPIDEMIA TYPE: Primary | ICD-10-CM

## 2022-09-12 DIAGNOSIS — I10 HYPERTENSION, ESSENTIAL: ICD-10-CM

## 2022-09-12 NOTE — TELEPHONE ENCOUNTER
Received note from [employer] that PA for Praluent is . Spoke with pt. Pt will need to get labs.   Labs mailed to pt  Send lab results and recent chart notes to [phone]

## 2022-09-19 NOTE — TELEPHONE ENCOUNTER
No results Complex Repair And Epidermal Autograft Text: The defect edges were debeveled with a #15 scalpel blade.  The primary defect was closed partially with a complex linear closure.  Given the location of the defect, shape of the defect and the proximity to free margins an epidermal autograft was deemed most appropriate to repair the remaining defect.  The graft was trimmed to fit the size of the remaining defect.  The graft was then placed in the primary defect, oriented appropriately, and sutured into place.

## 2022-09-20 LAB
CHOLESTEROL/HDL RATIO: 2.7 RATIO
CHOLESTEROL: 181 MG/DL
HDLC SERPL-MCNC: 67 MG/DL
LDL CHOLESTEROL CALCULATED: 81 MG/DL
LDL/HDL RATIO: 1.2 RATIO
T3 FREE: 2.53 PG/ML (ref 2.2–4.2)
T4 FREE: 1.01 NG/DL (ref 0.8–1.9)
TRIGL SERPL-MCNC: 165 MG/DL
TSH SERPL DL<=0.05 MIU/L-ACNC: 4.89 UIU/ML (ref 0.4–4.1)
VLDLC SERPL CALC-MCNC: 33 MG/DL

## 2022-09-20 NOTE — TELEPHONE ENCOUNTER
Labs resulted. Labs printed along with last OV note and faxed to [employer] at [phone].

## 2022-09-21 ENCOUNTER — TELEPHONE (OUTPATIENT)
Dept: FAMILY MEDICINE CLINIC | Age: 62
End: 2022-09-21

## 2022-09-21 DIAGNOSIS — E03.8 HYPOTHYROIDISM DUE TO HASHIMOTO'S THYROIDITIS: Primary | Chronic | ICD-10-CM

## 2022-09-21 DIAGNOSIS — E06.3 HYPOTHYROIDISM DUE TO HASHIMOTO'S THYROIDITIS: Primary | Chronic | ICD-10-CM

## 2022-09-21 LAB — THYROID PEROXIDASE ANTIBODY: >900 IU/ML

## 2022-09-21 RX ORDER — LEVOTHYROXINE SODIUM 0.03 MG/1
25 TABLET ORAL DAILY
Qty: 30 TABLET | Refills: 3 | Status: SHIPPED | OUTPATIENT
Start: 2022-09-21 | End: 2022-10-17

## 2022-09-21 NOTE — TELEPHONE ENCOUNTER
Left message on answering machine requesting pt to call back at earliest convenience.        Lab slip mailed

## 2022-09-21 NOTE — TELEPHONE ENCOUNTER
----- Message from Corrina Vital,  sent at 9/21/2022  7:13 AM EDT -----  Please let pt know that labs c/w new onset, mildly underactive thyroid  Recommend we start her on synthroid, at a low dose of 25mcg daily, to improve this. Recommend repeat TSH in 6 wks, non-fasting ok  Let me know if questions, thanks!

## 2022-10-11 ENCOUNTER — OFFICE VISIT (OUTPATIENT)
Dept: CARDIOLOGY CLINIC | Age: 62
End: 2022-10-11
Payer: MEDICARE

## 2022-10-11 ENCOUNTER — OFFICE VISIT (OUTPATIENT)
Dept: FAMILY MEDICINE CLINIC | Age: 62
End: 2022-10-11
Payer: MEDICARE

## 2022-10-11 VITALS
WEIGHT: 171.4 LBS | HEIGHT: 62 IN | TEMPERATURE: 97.1 F | RESPIRATION RATE: 16 BRPM | HEART RATE: 85 BPM | SYSTOLIC BLOOD PRESSURE: 128 MMHG | OXYGEN SATURATION: 98 % | DIASTOLIC BLOOD PRESSURE: 62 MMHG | BODY MASS INDEX: 31.54 KG/M2

## 2022-10-11 VITALS
BODY MASS INDEX: 31.83 KG/M2 | DIASTOLIC BLOOD PRESSURE: 67 MMHG | SYSTOLIC BLOOD PRESSURE: 112 MMHG | HEIGHT: 62 IN | HEART RATE: 89 BPM | WEIGHT: 173 LBS

## 2022-10-11 DIAGNOSIS — E78.5 HYPERLIPIDEMIA, UNSPECIFIED HYPERLIPIDEMIA TYPE: ICD-10-CM

## 2022-10-11 DIAGNOSIS — E03.8 HYPOTHYROIDISM DUE TO HASHIMOTO'S THYROIDITIS: ICD-10-CM

## 2022-10-11 DIAGNOSIS — I10 HYPERTENSION, ESSENTIAL: Primary | ICD-10-CM

## 2022-10-11 DIAGNOSIS — E06.3 HYPOTHYROIDISM DUE TO HASHIMOTO'S THYROIDITIS: ICD-10-CM

## 2022-10-11 DIAGNOSIS — H81.13 BENIGN PAROXYSMAL POSITIONAL VERTIGO DUE TO BILATERAL VESTIBULAR DISORDER: Primary | ICD-10-CM

## 2022-10-11 PROCEDURE — 99214 OFFICE O/P EST MOD 30 MIN: CPT | Performed by: FAMILY MEDICINE

## 2022-10-11 PROCEDURE — 90674 CCIIV4 VAC NO PRSV 0.5 ML IM: CPT | Performed by: FAMILY MEDICINE

## 2022-10-11 PROCEDURE — 99213 OFFICE O/P EST LOW 20 MIN: CPT | Performed by: NURSE PRACTITIONER

## 2022-10-11 PROCEDURE — 93000 ELECTROCARDIOGRAM COMPLETE: CPT | Performed by: NURSE PRACTITIONER

## 2022-10-11 PROCEDURE — 90471 IMMUNIZATION ADMIN: CPT | Performed by: FAMILY MEDICINE

## 2022-10-11 RX ORDER — MECLIZINE HYDROCHLORIDE 25 MG/1
25 TABLET ORAL 3 TIMES DAILY PRN
Qty: 45 TABLET | Refills: 0 | Status: SHIPPED | OUTPATIENT
Start: 2022-10-11

## 2022-10-11 SDOH — ECONOMIC STABILITY: FOOD INSECURITY: WITHIN THE PAST 12 MONTHS, THE FOOD YOU BOUGHT JUST DIDN'T LAST AND YOU DIDN'T HAVE MONEY TO GET MORE.: NEVER TRUE

## 2022-10-11 SDOH — ECONOMIC STABILITY: FOOD INSECURITY: WITHIN THE PAST 12 MONTHS, YOU WORRIED THAT YOUR FOOD WOULD RUN OUT BEFORE YOU GOT MONEY TO BUY MORE.: NEVER TRUE

## 2022-10-11 ASSESSMENT — SOCIAL DETERMINANTS OF HEALTH (SDOH): HOW HARD IS IT FOR YOU TO PAY FOR THE VERY BASICS LIKE FOOD, HOUSING, MEDICAL CARE, AND HEATING?: NOT HARD AT ALL

## 2022-10-11 NOTE — PATIENT INSTRUCTIONS
Continue current medications as prescribed. Continue to stay as active as you can. Try to eat heart healthy diet. Follow-up with your PCP as scheduled. Follow-up with Dr. Maryjo Johnson in one year as scheduled or sooner if need.

## 2022-10-11 NOTE — PROGRESS NOTES
Vaccine Information Sheet, \"Influenza - Inactivated\"  given to Lisset Guzmán, or parent/legal guardian of  Lisset Guzmán and verbalized understanding. Patient responses:    Have you ever had a reaction to a flu vaccine? No  Do you have an allergy to eggs, neomycin or polymixin? No  Do you have an allergy to Thimerosal, contact lens solution, or Merthiolate? No  Have you ever had Guillian Rawlings Syndrome? No  Do you have any current illness? No  Do you have a temperature above 100 degrees? No  Are you pregnant? No  If pregnant, permission obtained from physician? No  Do you have an active neurological disorder? No      Flu vaccine given per order. Please see immunization tab.

## 2022-10-11 NOTE — PROGRESS NOTES
[id number] [facility] [place] [address] [place] [address]  Dept: [phone]  Dept Fax: [phone]  Loc: [phone]    Visit Date: 10/11/2022    Ms. Med Hays is a 60 y.o. female  who presented for: 1 year f/u. Chief Complaint   Patient presents with    1 Year Follow Up       HPI:   HPI   Last seen in office on 10/11/21 per Dr. Radha Matute for 1 year follow-up. Per office note:   Pt is here for 1 year f/u. She has a hx of HTN and HLD. She was recently diagnosed with DMII and is currently taking metformin. Pt currently taking Praluent/zetia. Used to take repatha, but insruance did not cover and that is why she was switched to praluent/zetia. She also takes Hyzaar for HTN. Pt is not taking stating because of elevated LFTs. Pt does not complain of any side effects from the medications. BP is usually normal. No chest pain. No SOB. No unusual leg pain or swelling. Rare episodes of dizziness. Assessment/Plan   HLD - likely familial  Failed statin and caused elevated LFTs. Pt is currently on praluent and zetia. Latest lipid panel showed that praluent and zetia resulted in great HLD control. Hx of HTN well controlled with Hyzaar. Plan:    1. Continue with praluent and zetia. Find a way around insurance to get praluent covered. 2. Continue with hyzaar for HTN. 3. F/u in 1 year.       Disposition:  1 year          Current Outpatient Medications:     meclizine (ANTIVERT) 25 MG tablet, Take 1 tablet by mouth 3 times daily as needed for Dizziness, Disp: 45 tablet, Rfl: 0    levothyroxine (SYNTHROID) 25 MCG tablet, Take 1 tablet by mouth daily, Disp: 30 tablet, Rfl: 3    ezetimibe (ZETIA) 10 MG tablet, TAKE 1 TABLET BY MOUTH EVERY DAY, Disp: 90 tablet, Rfl: 3    metFORMIN (GLUCOPHAGE) 500 MG tablet, TAKE 1 TABLET BY MOUTH TWICE A DAY WITH MEALS, Disp: 180 tablet, Rfl: 3    alirocumab (PRALUENT) 75 MG/ML SOAJ injection pen, Inject 1 mL into the skin every 14 days, Disp: 6 pen, Rfl: 3    cyclobenzaprine (FLEXERIL) 10 MG tablet, Take 1 tablet by mouth 3 times daily as needed for Muscle spasms, Disp: 90 tablet, Rfl: 0    losartan-hydroCHLOROthiazide (HYZAAR) 100-12.5 MG per tablet, Take 1 tablet by mouth daily TAKE 1 TABLET BY MOUTH EVERY DAY, Disp: 90 tablet, Rfl: 3    montelukast (SINGULAIR) 10 MG tablet, Take 1 tablet by mouth nightly, Disp: 90 tablet, Rfl: 3    estrogens, conjugated, (PREMARIN) 0.3 MG tablet, Take 1 tablet by mouth daily, Disp: 90 tablet, Rfl: 3    diphenhydrAMINE (BENADRYL) 25 MG tablet, Take 25 mg by mouth every 6 hours as needed for Itching, Disp: , Rfl:     loratadine (CLARITIN) 10 MG tablet, Take 10 mg by mouth daily, Disp: , Rfl:     betamethasone valerate (VALISONE) 0.1 % lotion, Apply topically 2 times daily. , Disp: 60 mL, Rfl: 0    Omega-3 Fatty Acids (FISH OIL) 1000 MG CAPS, Take 2,000 mg by mouth daily, Disp: , Rfl:     Past Medical History  Anthony Giron  has a past medical history of Chronic venous insufficiency of lower extremity, DM2 (diabetes mellitus, type 2) ([address] Dyslipidemia, Hypertension, essential, Hypothyroidism, Obesity (BMI 30-39.9), Statin intolerance, and Surgical menopause. Social History  Mchugh  reports that she has never smoked. She has never used smokeless tobacco. She reports that she does not drink alcohol and does not use drugs. Family History  Mchugh family history includes Breast Cancer in an other family member; Diabetes in her mother; Heart Disease in her father; High Blood Pressure in her father. There is no family history of bicuspid aortic valve, aneurysms, heart transplant, pacemakers, defibrillators, or sudden cardiac death.       Past Surgical History   Past Surgical History:   Procedure Laterality Date     SECTION       x 3     SECTION      x3    CHOLECYSTECTOMY      INTRACAPSULAR CATARACT EXTRACTION Left 10/13/2020    EXTRACAPSULAR CATARACT REMOVAL WITH INSERTION OF INTRAOCULAR LENS PROTHESIS LEFT performed by Carol Williamson MD at [facility] [place] Bilateral     knees    JEANINE AND BSO (CERVIX REMOVED)  04/1986    Endometriosis. 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 Eyes: Negative for pain, discharge, redness and itching. Respiratory: Negative for PND, orthopnea, cough  Gastrointestinal: Negative for blood in stool, constipation, diarrhea   Endocrine: Negative for cold intolerance, heat intolerance, polydipsia. Genitourinary: Negative for dysuria, enuresis, flank pain and hematuria. Musculoskeletal: Negative for arthralgias, joint swelling and neck pain. Neurological: Negative for numbness and headaches. Psychiatric/Behavioral: Negative for agitation, confusion, decreased concentration and dysphoric mood. Objective:     /67   Pulse 89   Ht 5' 2\" (1.575 m)   Wt 173 lb (78.5 kg)   BMI 31.64 kg/m²     Wt Readings from Last 3 Encounters:   10/11/22 173 lb (78.5 kg)   10/11/22 171 lb 6.4 oz (77.7 kg)   08/08/22 174 lb (78.9 kg)     BP Readings from Last 3 Encounters:   10/11/22 112/67   10/11/22 128/62   08/08/22 128/68       Nursing note and vitals reviewed. Physical Exam   Constitutional: Oriented to person, place, and time. Appears well-developed and well-nourished. Bright, pleasant, appears well. HENT:   Head: Normocephalic and atraumatic.    Eyes: EOM are normal. Pupils are equal, round, and reactive to light. Neck: Normal range of motion. Neck supple. No JVD present. Cardiovascular: Normal rate, regular rhythm, normal heart sounds and intact distal pulses. No murmur heard. Pulmonary/Chest: Effort normal and breath sounds normal. No respiratory distress. No wheezes. No rales. Abdominal: Soft. Bowel sounds are normal. No distension. There is no tenderness. Musculoskeletal: Normal range of motion. No edema. Neurological: Alert and oriented to person, place, and time. No cranial nerve deficit. Coordination normal.   Skin: Skin is warm and dry. Psychiatric: Normal mood and affect.        No results found for: CKTOTAL, CKMB, CKMBINDEX    Lab Results   Component Value Date/Time    WBC 4.3 07/28/2022 11:30 AM    WBC 4.3 07/28/2022 12:00 AM    RBC 4.63 07/28/2022 11:30 AM    HGB 12.9 07/28/2022 11:30 AM    HCT 38.6 07/28/2022 11:30 AM    MCV 83.4 07/28/2022 11:30 AM    MCH 27.9 07/28/2022 11:30 AM    MCHC 33.4 07/28/2022 11:30 AM    RDW 12.9 07/28/2022 11:30 AM     07/28/2022 11:30 AM     07/28/2022 12:00 AM    MPV 10.8 07/28/2022 11:30 AM       Lab Results   Component Value Date/Time     07/28/2022 11:30 AM    K 5.3 07/28/2022 11:30 AM     07/28/2022 11:30 AM    CO2 30 07/28/2022 11:30 AM    BUN 14 07/28/2022 11:30 AM    LABALBU 4.7 07/28/2022 11:30 AM    CREATININE 0.82 07/28/2022 11:30 AM    CALCIUM 9.3 07/28/2022 11:30 AM    LABGLOM 80 07/28/2022 12:00 AM    GLUCOSE 96 07/28/2022 11:30 AM       Lab Results   Component Value Date/Time    ALKPHOS 61 07/28/2022 11:30 AM    ALKPHOS 61 07/28/2022 12:00 AM    ALT 19 07/28/2022 11:30 AM    AST 22 07/28/2022 11:30 AM    PROT 6.4 07/28/2022 11:30 AM    BILITOT 0.4 07/28/2022 11:30 AM    LABALBU 4.7 07/28/2022 11:30 AM       No results found for: MG    No results found for: INR, PROTIME      Lab Results   Component Value Date/Time    LABA1C 6.1 02/08/2022 09:36 AM    LABA1C 7.1 05/13/2021 12:00 AM       Lab Results   Component Value Date/Time    TRIG 165 09/19/2022 12:02 PM    HDL 67 09/19/2022 12:02 PM    LDLCALC 81 09/19/2022 12:02 PM    LABVLDL 33 09/19/2022 12:02 PM       Lab Results   Component Value Date/Time    TSH 4.890 09/19/2022 12:02 PM         Testing Reviewed:      I have individually reviewed the cardiac test below:    ECHO: No results found for this or any previous visit. Assessment/Plan   HLD - likely familial  Failed statin and caused elevated LFTs. Pt is currently on praluent and zetia. Latest lipid panel showed that praluent and zetia resulted in great HLD control. Continues  Hx of HTN well controlled with Hyzaar. Some vertigo - inner ear issues as above. Overall doing well with no issues. Continue current medications as prescribed. Continue to stay as active as you can. Try to eat heart healthy diet. Follow-up with your PCP as scheduled. Follow-up with Dr. Henry Chapa in one year as scheduled or sooner if need. Disposition:  1 year     Electronically signed by NINA Watts CNP   10/15/2022 at 1:39 PM EDT      Attending Deemelo  I performed a history and physical examination on the patient and discussed the management with the resident physician/med student. I reviewed and agree with the findings and plan as documented in the resident's/medical student's note.   Electronically signed by Rao Matos MD on 10/11/21 at 2:11 PM EDT

## 2022-10-11 NOTE — PROGRESS NOTES
Chief Complaint   Patient presents with    Dizziness    Hypothyroidism     New dx, has questions       History obtained from the patient. SUBJECTIVE:  Christy Payne is a 60 y.o. female that presents today     -Vertigo:  Started 2 wks ago  Feels like room moving/spinning  Typically no vertigo at rest  Occurs when changes positions, once rests, improves  No tinnitus  No change in hearing  No falls or head trauma  Vertigo seems to be trigging inc in her chronic headaches  No lateralizing numbness or weakness.      -Hypothyroidism:     HPI:  New dx  Mild  + TPO ATB  Started on synthroid 25mcg end of SEPT 2021  Tolerating well thus far  1st time seen for this new dx    Currently treated for Hypothyroidism? Yes  Fatigue? No  Recent change in weight? No  Cold/Heat intolerance? No  Diarrhea/Constipation? No  Diaphoresis? No  Anxiety? No  Palpitations? No   Hair Loss? No    Lab Results   Component Value Date    TSH 4.890 (H) 09/19/2022       Age/Gender Health Maintenance    Lipid -   Lab Results   Component Value Date    CHOL 181 09/19/2022    CHOL 193 07/28/2022    CHOL 193 07/28/2022     Lab Results   Component Value Date    TRIG 165 (H) 09/19/2022    TRIG 158 (H) 07/28/2022    TRIG 158 07/28/2022     Lab Results   Component Value Date    HDL 67 09/19/2022    HDL 70 07/28/2022    HDL 70 07/28/2022     Lab Results   Component Value Date    LDLCALC 81 09/19/2022    LDLCALC 91 07/28/2022    LDLCALC 91 07/28/2022       Colon Cancer Screening - Small polyp OCT 2013, repeat 7 to 10 years, per Sheik DE LA CRUZ. Lung Cancer Screening - never soker    Tetanus - UTD AUG 2021  Influenza Vaccine - UTD FALL 2022  Pneumonia Vaccine - UTD AUG 2021  Zoster - UTD x 2    Breast Cancer Screening - NEG AUG 2022  Cervical Cancer Screening - hyst for benign reasons.    Osteoporosis Screening - neg June 2021      Diabetes Health Maintenance    A1C -   Lab Results   Component Value Date/Time    LABA1C 6.1 02/08/2022 09:36 AM    LABA1C 6.3 08/05/2021 12:47 PM    LABA1C 7.1 05/13/2021 12:00 AM       ACE/ARB - yes, cozaar  Eye - UTD JAN 2022  Foot - UTD AUG 2022    ASA - no  Microal/Cr -   Lab Results   Component Value Date    LABMICR < 1.20 02/08/2022    LABCREA 215.8 02/08/2022    MACRR 6 02/08/2022     Lab Results   Component Value Date    CREATINEUR 103.9 07/28/2022    MICROALBUR <12.0 07/28/2022       eGFR - >60, June 2021    No results found for: GFRESTIMATE  Lab Results   Component Value Date/Time    LABGLOM 80 07/28/2022 12:00 AM     No results found for: [place]  Lab Results   Component Value Date/Time    EGFRNONAA 80 07/28/2022 11:30 AM     No results found for: EGFRAA      Statin - no, intolerant      Current Outpatient Medications   Medication Sig Dispense Refill    meclizine (ANTIVERT) 25 MG tablet Take 1 tablet by mouth 3 times daily as needed for Dizziness 45 tablet 0    levothyroxine (SYNTHROID) 25 MCG tablet Take 1 tablet by mouth daily 30 tablet 3    ezetimibe (ZETIA) 10 MG tablet TAKE 1 TABLET BY MOUTH EVERY DAY 90 tablet 3    metFORMIN (GLUCOPHAGE) 500 MG tablet TAKE 1 TABLET BY MOUTH TWICE A DAY WITH MEALS 180 tablet 3    alirocumab (PRALUENT) 75 MG/ML SOAJ injection pen Inject 1 mL into the skin every 14 days 6 pen 3    cyclobenzaprine (FLEXERIL) 10 MG tablet Take 1 tablet by mouth 3 times daily as needed for Muscle spasms 90 tablet 0    losartan-hydroCHLOROthiazide (HYZAAR) 100-12.5 MG per tablet Take 1 tablet by mouth daily TAKE 1 TABLET BY MOUTH EVERY DAY 90 tablet 3    montelukast (SINGULAIR) 10 MG tablet Take 1 tablet by mouth nightly 90 tablet 3    estrogens, conjugated, (PREMARIN) 0.3 MG tablet Take 1 tablet by mouth daily 90 tablet 3    diphenhydrAMINE (BENADRYL) 25 MG tablet Take 25 mg by mouth every 6 hours as needed for Itching      loratadine (CLARITIN) 10 MG tablet Take 10 mg by mouth daily      betamethasone valerate (VALISONE) 0.1 % lotion Apply topically 2 times daily.  60 mL 0    Omega-3 Fatty Acids (FISH OIL) 1000 MG CAPS Take 2,000 mg by mouth daily       No current facility-administered medications for this visit. Orders Placed This Encounter   Medications    meclizine (ANTIVERT) 25 MG tablet     Sig: Take 1 tablet by mouth 3 times daily as needed for Dizziness     Dispense:  45 tablet     Refill:  0             All medications reviewed and reconciled, including OTC and herbal medications. Updated list given to patient. Patient Active Problem List    Diagnosis Date Noted    Hypothyroidism     DM2 (diabetes mellitus, type 2) ([facility] [address]     Obesity (BMI 30-39.9)     Epidermal cyst of face 2021    Bilateral temporomandibular joint pain 2021    Chronic venous insufficiency of lower extremity 2021    Venous stasis dermatitis of both lower extremities 2021    Hypertension, essential     Surgical menopause     Statin intolerance      LFTS      Dyslipidemia        Past Medical History:   Diagnosis Date    Chronic venous insufficiency of lower extremity 2021    DM2 (diabetes mellitus, type 2) ([facility])     Dyslipidemia     Hypertension, essential     Hypothyroidism     Obesity (BMI 30-39. 9)     Statin intolerance     LFTS    Surgical menopause        Past Surgical History:   Procedure Laterality Date     SECTION       x 3     SECTION      x3    CHOLECYSTECTOMY      INTRACAPSULAR CATARACT EXTRACTION Left 10/13/2020    EXTRACAPSULAR CATARACT REMOVAL WITH INSERTION OF INTRAOCULAR LENS PROTHESIS LEFT performed [facility]delrah[facility] Del [facility] [address] [facility] Bilateral     knees    JEANINE AND BSO (CERVIX REMOVED)  1986    Endometriosis.        Allergies   Allergen Reactions    Statins      Liver issues    Valium [Diazepam] Other (See Comments)     Patient gets very irritated       Social History     Tobacco Use    Smoking status: Never    Smokeless tobacco: Never   Substance Use Topics    Alcohol use: No       Family History   Problem Relation Age of Onset Diabetes Mother     High Blood Pressure Father     Heart Disease Father     Breast Cancer Other         1/2 sister    Colon Cancer Neg Hx          I have reviewed the patient's past medical history, past surgical history, allergies, medications, social and family history and I have made updates where appropriate. Review of Systems  Positive responses are highlighted in bold    Constitutional:  Fever, Chills, Night Sweats, Fatigue, Unexpected changes in weight  HENT:  Ear pain, Tinnitus, Nosebleeds, Trouble swallowing, Hearing loss, Sore throat  Cardiovascular:  Chest Pain, Palpitations, Orthopnea, Paroxysmal Nocturnal Dyspnea  Respiratory:  Cough, Wheezing, Shortness of breath, Chest tightness, Apnea  Gastrointestinal:  Nausea, Vomiting, Diarrhea, Constipation, Heartburn, Blood in stool  Genitourinary:  Difficulty or painful urination, Flank pain, Change in frequency, Urgency  Skin:  Color change, Rash, Itching, Wound  Musculoskeletal:  Joint pain, Back pain, Gait problems, Joint swelling, Myalgias  Neurological:  Dizziness, Headaches, Presyncope, Numbness, Seizures, Tremors  Endocrine:  Heat Intolerance, Cold Intolerance, Polydipsia, Polyphagia, Polyuria      PHYSICAL EXAM:  Vitals:    10/11/22 1000 10/11/22 1027 10/11/22 1028   BP: 120/68 130/72 128/62   Position:  Supine Standing   Pulse: 70 85    Resp: 16     Temp: 97.1 °F (36.2 °C)     SpO2: 98%     Weight: 171 lb 6.4 oz (77.7 kg)     Height: 5' 2\" (1.575 m)       Body mass index is 31.35 kg/m².        VS Reviewed  General Appearance: A&O x 3, No acute distress,well developed and well- nourished  Eyes: pupils equal, round, and reactive to light, extraocular eye movements intact, conjunctivae and eye lids without erythema  ENT: external ear and ear canal clear bilaterally, TMs intact and regular, nose without deformity, nasal mucosa and turbinates normal without polyps, oropharynx normal, dentition is normal for age  Neck: supple and non-tender without mass, no thyromegaly or thyroid nodules, no cervical lymphadenopathy  Pulmonary/Chest: clear to auscultation bilaterally- no wheezes, rales or rhonchi, normal air movement, no respiratory distress or retractions  Cardiovascular: S1 and S2 auscultated w/ RRR. No murmurs, rubs, clicks, or gallops, distal pulses intact. Abdomen: soft, non-tender, non-distended, bowel sounds physiologic,  no rebound or guarding, no masses or hernias noted. Liver and spleen without enlargement. Extremities: no cyanosis, clubbing or edema of the lower extremities. Skin: warm and dry, no rash or erythema  Neuro Exam:   Cranial [id number] Intact.    Cranial nerve II: Normal Visual Acuity   Cranial nerve III: Pupils: equal, round, reactive to light   Cranial nerves III, IV, VI: Extraocular Movements: intact   Cranial nerve V: Facial sensation: intact   Cranial nerve VII:Facial strength: intact   Cranial nerve VIII: Hearing: intact   Cranial nerve IX: Palate Elevation intact bilaterally  Cranial nerve XI: Shoulder shrug intact bilaterally  Cranial nerve XII: Tongue movement: normal     Muscle Strength Testing    Deltoid Right 5/5  Left 5/5  Biceps Right 5/5  Left 5/5  Triceps Right 5/5  Left 5/5  Wrist Extensors Right  5/5  Left 5/5   Flexor Digitorum Profundus Right 5/5  Left 5/5  Hand Intrinsics Right 5/5  Left 5/5  Hip Flexors Right 5/5  Left 5/5  Quadriceps Right 5/5  Left 5/5  Anterior Tibialis Right 5/5  Left 5/5  Extensor Hallucis Longus Right 5/5  Left 5/5  Gastrocnemius/Soleus Right 5/5  Left 5/5     Sensory Testing    C5 Right 0=Normal; no sensory loss Left 0=Normal; no sensory loss  C6 Right 0=Normal; no sensory loss Left 0=Normal; no sensory loss  C7 Right 0=Normal; no sensory loss Left 0=Normal; no sensory loss  C8 Right 0=Normal; no sensory loss Left 0=Normal; no sensory loss  T1 Right 0=Normal; no sensory loss Left 0=Normal; no sensory loss    L2 Right 0=Normal; no sensory loss Left 0=Normal; no sensory loss  L3 Right 0=Normal; no sensory loss Left 0=Normal; no sensory loss  L4 Right 0=Normal; no sensory loss Left 0=Normal; no sensory loss  L5 Right 0=Normal; no sensory loss Left 0=Normal; no sensory loss  S1 Right 0=Normal; no sensory loss Left 0=Normal; no sensory loss     Cerebellar Testing    Finger to Nose:  Right  WNL Yes;  Left WNL  Yes  Heel to [place] WNL: Right  WNL  Yes;  Left WNL  Yes     Deep Tendon Reflexes 2/4 equal and symmetric throughout   Clonus:  No  Decreased arm swing No   Shuffling gait No  Narrow base of support No  Able to stand without using arms  Yes  Bradykinesia  No  Tremor No  Increased tone at wrist especially with opening/closing opposite hand  No  [place]-Hallpike: + bilat      Component      Latest Ref Rng & Units 9/19/2022   TSH      0.400 - 4.10 uIu/mL 4.890 (H)   T4 Free      0.80 - 1.90 ng/dL 1.01   T3,FREE,FT3      2.2 - 4.2 pg/mL 2.53   Thyroid Peroxidase Antibody      <9 IU/ML >900 (H)       ASSESSMENT & PLAN  1. Benign paroxysmal positional vertigo due to bilateral vestibular disorder    Hx and exam most c/w BPPV  VSS  Exam reassuring  Prn meclizine  HEP given  Referred to vestibular rehab  F/u if no better  Reviewed ER precautions, pt understands. - meclizine (ANTIVERT) 25 MG tablet; Take 1 tablet by mouth 3 times daily as needed for Dizziness  Dispense: 45 tablet; Refill: 0  - [facility]    2. Hypothyroidism due to Hashimoto's thyroiditis    New dx  Mild  +TPO  Started on synthroid 25mcg dialy  TSH in 6 wks, has order. All questions answered. DISPOSITION    Return if symptoms worsen or fail to improve. Mchugh released without restrictions.     Future Appointments   Date Time Provider Tana Gracia   10/11/2022  1:30 PM Serafin Love APRN - CNP N [facility]   10/12/2022 11:15 AM Eliazar Browning PT JOSE L PT [facility]   2/9/2023 11:00 AM Kleber Trevizo DO Fam [facility] [address]     PATIENT COUNSELING    Barriers to learning and self management: none    Discussed use, benefit, and side effects of prescribed medications. Barriers to medication compliance addressed. All patient questions answered. Pt voiced understanding.        Electronically signed by Med Mattson DO on 10/11/2022 at 11:58 AM

## 2022-10-12 ENCOUNTER — HOSPITAL ENCOUNTER (OUTPATIENT)
Dept: PHYSICAL THERAPY | Age: 62
Setting detail: THERAPIES SERIES
Discharge: HOME | End: 2022-10-12
Payer: MEDICARE

## 2022-10-12 PROCEDURE — 97162 PT EVAL MOD COMPLEX 30 MIN: CPT

## 2022-10-12 PROCEDURE — 97112 NEUROMUSCULAR REEDUCATION: CPT

## 2022-10-12 NOTE — PROGRESS NOTES
** PLEASE SIGN, DATE AND TIME CERTIFICATION BELOW AND RETURN TO [facility] OUTPATIENT REHABILITATION (FAX #: [phone]). ATTEST/CO-SIGN IF ACCESSING VIA INSpeak With Me. THANK YOU.**    I certify that I have examined the patient below and determined that Physical Medicine and Rehabilitation service is necessary and that I approve the established plan of care for up to 90 days or as specifically noted. Attestation, signature or co-signature of physician indicates approval of certification requirements.    ________________________ ____________ __________  Physician Signature   Date   Time  Freda Nguy[address]  PHYSICAL THERAPY  [x] VESTIBULAR EVALUATION  [] DAILY NOTE [] PROGRESS NOTE [] DISCHARGE NOTE    [x] OUTPATIENT REHABILITATION [facility]   [] [place] [address]    [] [facility]   [] [facility]    Date: 10/12/2022  Patient Name:  Sabas Young  : 1960  MRN: [id number]  CSN: [id number]    Referring Practitioner Yusra Braun DO   Diagnosis Benign paroxysmal positional vertigo due to bilateral vestibular disorder [H81.13]    Treatment Diagnosis Vestibular hypofunction,    Date of Evaluation 10/12/22   Additional Pertinent History HTN, DM type 2, obesity, bilateral TKR, chronic venous insufficiency BLE. Recent onset of vertigo, cervical spur- cannot extend neck without \"passing out\"       Functional Outcome Measure Used Dizziness Handicap Inventory   Functional Outcome Score 36/100  (10/12/22) Evaluation      Insurance: Primary: Payor: ELIECER Sam /  /  / ,   Secondary:    Authorization Information: PRECERTIFICATION REQUIRED:  n/a  INSURANCE THERAPY BENEFIT:  Allowed 30 visits Physical Therapy /Occupational Therapy/Speech Therapy per calendar year. No visit limit for PT/OT/ST for patients age 8 and under. FCE-Covered, no precert required. Benefit will not cover maintenance or preventative treatment. AQUATIC THERAPY COVERED:   Yes  MODALITIES COVERED:  Yes. Iontophoresis and Hot/Cold Packs are not covered. TELEHEALTH COVERED: Yes  REFERENCE #: Visit # 1, 1/10 for progress note   Visits Allowed: 30   Recertification Date: 28/6/4096   Physician Follow-Up:    Physician Orders: Vestibular rehab   History of Present Illness: Patient reports that she has had symptoms of spinning/moving with position changes bending forward, 1x getting in and out of bed. Notes inside her head uncomfortable and does not want to have quick head movement due to fear of triggering symptoms. These symptoms have been going on for past 2 weeks. She does have history sinus issues, allergies sensitivity like dust and hay fever symptoms-generic Claritin and generic Singular are taken and then also Benadryl every night before bedtime. This is the 2nd time she has had these vertigo episodes. Possible around 2015 when she was treated by Christina Muhammad PT vestibular specialist. At that time more episodic and not constant like right now. Denies of any falls. Notes sometimes wobble or feels unsteady      SUBJECTIVE: Patient reports that she has had symptoms of spinning/moving with position changes bending forward, 1x getting in and out of bed. Notes inside her head uncomfortable and does not want to have quick head movement due to fear of triggering symptoms. These symptoms have been going on for past 2 weeks. She does have history sinus issues, allergies sensitivity like dust and hay fever symptoms-generic Claritin and generic Singular are taken and then also Benadryl every night before bedtime. This is the 2nd time she has had these vertigo episodes. Possible around 2015 when she was treated by Christina Muhammad PT vestibular specialist. At that time more episodic and not constant like right now. Denies of any falls. Notes sometimes wobble or feels unsteady    She does take Meclizine for dizziness. She has only had only 1 tablet which she took at 200 pm in afternoon. (Yesterday)    Social/Functional History and Current Status:  Medications and Allergies have been reviewed and are listed on Medical History Questionnaire. Rosa Maria Weldon lives with family in a multiple floor home with ability to complete ADL's on main floor with stairs and a handrail to enter. 5 steps    Task Previous Current   ADLs  Independent Independent   IADL's Independent Independent   Ambulation Independent Independent   Transfers Independent Independent   Recreation Independent reading and jig saw puzzles, go out to eat with friends, computer games Independent   Community Integration Independent Independent   Driving Active  Active    Work Part-Time. 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 .. Induced by Motion and Occur When Walking   Symptoms of imbalanc worse with. ..  Self-Motion: Yes bending forward, quick head movements   Neck ROM Limited: left rotation 60 degrees, right rotation 80 degrees WNLS   Vertebral Artery Testing Negative   Alar ligament test Negative   Functional LE Strength WNL     Oculomotor/VOR  Oculomotor Testing (with fixation) Left Right Comments/Observations/Symptoms   Spontaneous Nystagmus - -    Smooth Pursuit + -    Saccades + -    Head Thrust - -      Functional Reach: >11 inches WNLS  Balance:  Modified CTSIB Time Sway Strategy   Level Surface - eyes open >45 seconds -    Level Surface - eyes closed 30 seconds -    Foam - eyes open      Foam - eyes closed        Timed Up and Go Comments   Assistive Device No assistive devie   Level of Assist Provided No assist   Path/LOB No loss of balance    Time Elapsed (>10 sec = fall risk) 8 seconds WNLS no fall risk       Positional BPPV Testing Left Right Duration Observations   Loaded [place]-Hallpike - - > 60 seconds    Roll Test - - > 60 seconds    Side-lying Test   Not tested in sidelying due to tolerating adamaris Hallpike position        TREATMENT   Precautions:    Pain:     X in shaded column indicates activity completed today   Modalities Parameters/  Location  Notes                     Manual Therapy Time/Technique  Notes                     Exercise/Intervention   Notes   Vestibular Education:  x BPPV education, Vestibular hypofunction education, x1 viewing exercises  handout issued. Post Maneuver Precautions:             X1 viewing exercises with head nods and rotation for 1 minute,    x Seated position                                                       Specific Interventions Next Treatment: Reassess for BPPV with patient holding on Meclizine, Roll test, [employer] testing reassess. Vestibular hypofunction: x1 viewing exercises    Activity/Treatment Tolerance:  [x]  Patient tolerated treatment well  []  Patient limited by fatigue  []  Patient limited by pain   []  Patient limited by medical complications  []  Other:     Assessment: Patient referred to PT for BPPV. Patient took Meclizine less than 24 hours ago and may have suppressed testing for [employer] position. So advised not to take Meclizine prior to PT sessions for >24 hours. Noting vestibular hypofunction today with positive tests on left for saccades and smooth pursuit. X1 viewing ex issued. Limited head/neck positions due to history of cervical spur in which patient does not tolerate extension at neck. Will continue to follow 1x per week addressing further testing for BPPV, Vestibular hypofunction. Body Structures/Functions/Activity Limitations:impaired activity tolerance and vestibular rehab: vertigo/dizziness. Related to head movements bending forward.    Prognosis: good    GOALS:  Patient Goal: To have no dizziness and no spinning sensations in her head. Be able to have full activity level at work and household activities. Short Term Goals:  Time Frame: 4 weeks  Patient to demonstrate consistently negative testing for BPPV. Patient to demonstrate negative testing for smooth pursuit and saccades with left eye. 3. Patient able to complete full work activities bending forward, turning her head/neck without symptoms of feeling off balance and \"spinning in her head\"   4. Dizziness Handicap Inventory from 36/100 to 12/100    Long Term Goals:  Time Frame: 8 weeks  Dizziness Handicap Inventory from 12/100 to 5/100. Patient independent in HEP of x1 viewing ex, and progression in the exercises with improved vestibular function. Patient Education:   [x]  HEP/Education Completed: Plan of Care, Goals, x1 viewing ex seated, no Meclizine medication > 24 hours prior to PT session for testing for BPPV. [employer] Access Code:  []  No new Education completed  []  Reviewed Prior HEP      []  Patient verbalized and/or demonstrated understanding of education provided. []  Patient unable to verbalize and/or demonstrate understanding of education provided. Will continue education. [x]  Barriers to learning: none    PLAN:  Treatment Recommendations: Vestibular Rehabilitation    [x]  Plan of care initiated. Plan to see patient 1 times per week for 8-12 weeks to address the treatment planned outlined above.   []  Continue with current plan of care  []  Modify plan of care as follows:    []  Hold pending physician visit  []  Discharge    Time In 1117   Time Out 1210   Timed Code Minutes: 15 min   Total Treatment Time: 53 min       Electronically Signed by: Deondre Starks PT

## 2022-10-16 DIAGNOSIS — E06.3 HYPOTHYROIDISM DUE TO HASHIMOTO'S THYROIDITIS: Chronic | ICD-10-CM

## 2022-10-16 DIAGNOSIS — E03.8 HYPOTHYROIDISM DUE TO HASHIMOTO'S THYROIDITIS: Chronic | ICD-10-CM

## 2022-10-17 ENCOUNTER — HOSPITAL ENCOUNTER (OUTPATIENT)
Dept: PHYSICAL THERAPY | Age: 62
Setting detail: THERAPIES SERIES
Discharge: HOME | End: 2022-10-17
Payer: MEDICARE

## 2022-10-17 PROCEDURE — 97112 NEUROMUSCULAR REEDUCATION: CPT

## 2022-10-17 RX ORDER — LEVOTHYROXINE SODIUM 0.03 MG/1
TABLET ORAL
Qty: 30 TABLET | Refills: 3 | Status: SHIPPED | OUTPATIENT
Start: 2022-10-17

## 2022-10-17 NOTE — TELEPHONE ENCOUNTER
Future Appointments   Date Time Provider Tana Gracia   10/17/2022  2:45 PM Shan Dallas, PT [facility]   10/28/2022  9:30 AM Shan Alfa, PT [facility]   11/4/2022 10:00 AM Shan Dallas, PT [facility]   11/11/2022  9:00 AM [facility] [place] [place] [facility]   2/9/2023 11:00 AM Shahrzad Failing, DO [facility] [place]   10/19/2023  1:15 PM Oswald Dove MD N [facility] [place]        Recent Visits  Date Type Provider Dept   10/11/22 Office Visit Shahrzad Failing, DO [facility]   08/08/22 Office Visit Shahrzad Failing, DO [facility]   05/23/22 Office Visit Shahrzad Failing, DO [facility]   02/21/22 Office Visit Shahrzad Failing, DO [facility]   02/08/22 Office Visit Shahrzad Failing, DO [facility]   12/20/21 Office Visit Shahrzad Failing, DO [facility]   11/05/21 Office Visit Shahrzad Failing, DO [facility]   08/05/21 Office Visit Shahrzad Failing, DO [facility]   Showing recent visits within past 540 days with a meds authorizing provider and meeting all other requirements  Future Appointments  Date Type Provider Dept   02/09/23 Appointment Shahrzad Failing, DO [facility]   Showing future appointments within next 150 days with a meds authorizing provider and meeting all other requirements

## 2022-10-17 NOTE — PROGRESS NOTES
Freda Nguyễn 59  PHYSICAL THERAPY  [] VESTIBULAR EVALUATION  [x] DAILY NOTE [] PROGRESS NOTE [] DISCHARGE NOTE    [x] [facility] [place]   [] [place] [address]    [] [facility]   [] [facility]    Date: 10/17/2022  Patient Name:  Lamont Nunez  : 1960  MRN: [id number]  CSN: [id number]    Referring Practitioner Ingrid Craig DO   Diagnosis Benign paroxysmal positional vertigo due to bilateral vestibular disorder [H81.13]    Treatment Diagnosis Vestibular hypofunction,    Date of Evaluation 10/12/22   Additional Pertinent History HTN, DM type 2, obesity, bilateral TKR, chronic venous insufficiency BLE. Recent onset of vertigo, cervical spur- cannot extend neck without \"passing out\"       Functional Outcome Measure Used Dizziness Handicap Inventory   Functional Outcome Score 36/100  (10/12/22) Evaluation      Insurance: Primary: Payor: ELIECER Sam /  /  / ,   Secondary:    Authorization Information: PRECERTIFICATION REQUIRED:  n/a  INSURANCE THERAPY BENEFIT:  Allowed 30 visits Physical Therapy /Occupational Therapy/Speech Therapy per calendar year. No visit limit for PT/OT/ST for patients age 8 and under. FCE-Covered, no precert required. Benefit will not cover maintenance or preventative treatment. AQUATIC THERAPY COVERED:   Yes  MODALITIES COVERED:  Yes. Iontophoresis and Hot/Cold Packs are not covered. TELEHEALTH COVERED: Yes  REFERENCE #: Visit # 2, 2/10 for progress note   Visits Allowed: 30   Recertification Date: 01/3/4030   Physician Follow-Up:    Physician Orders: Vestibular rehab   History of Present Illness: Patient reports that she has had symptoms of spinning/moving with position changes bending forward, 1x getting in and out of bed. Notes inside her head uncomfortable and does not want to have quick head movement due to fear of triggering symptoms. These symptoms have been going on for past 2 weeks.  She does have history sinus issues, allergies sensitivity like dust and hay fever symptoms-generic Claritin and generic Singular are taken and then also Benadryl every night before bedtime. This is the 2nd time she has had these vertigo episodes. Possible around 2015 when she was treated by Christina Muhammad, PT vestibular specialist. At that time more episodic and not constant like right now. Denies of any falls. Notes sometimes wobble or feels unsteady      SUBJECTIVE: Patient reports that she has not taken Meclizine since 1000 AM yesterday. She has had little wave of dizziness/off balance but not the spinning. She did x1 viewing ex up to 3-5x per day. Tolerating these ex okay. She is cautious in what she does during the day due to her symptoms of dizziness or off balance feeling. No spinning during week or today when rolling in bed. Notes able to do more at home like moving more to make her bed, do laundry and negotiate stair steps. Precautions:  Neck with cervical spur and cannot extend neck \"passes out\"      Oculomotor/VOR Reassessed on 2nd visit 10/17/2022  Oculomotor Testing (with fixation) Left Right Comments/Observations/Symptoms   Spontaneous Nystagmus - -    Smooth Pursuit - -    Saccades - -    Head Thrust - -        Normal Oculomotor exam today. Normal for BPPV testing with right/left [facility] testing today. TREATMENT   Precautions:    Pain:     X in shaded column indicates activity completed today   Modalities Parameters/  Location  Notes                     Manual Therapy Time/Technique  Notes                     Exercise/Intervention   Notes   Vestibular Education:  x BPPV education, Vestibular hypofunction education, x1 viewing exercises  handout issued.     Post Maneuver Precautions:             X1 viewing exercises with head nods and rotation for 1 minute each,    x Standing position with narrow base of support                                                       Specific Interventions Next Treatment: Reassess for BPPV with patient holding on Meclizine, Roll test, [employer] testing reassess. Vestibular hypofunction: x1 viewing exercises    Activity/Treatment Tolerance:  [x]  Patient tolerated treatment well  []  Patient limited by fatigue  []  Patient limited by pain   []  Patient limited by medical complications  []  Other:     Assessment: Recommending follow up with ENT if okay with Dr. Lakeshia Holguin. Oculomotor exam and BPPV testing clear. She does still feel off balance and continues to have sinus symptoms. Progressed x1 viewing ex in standing with narrow base of support. Tolerated progressions well in x1 viewing exercises. Will continue to follow 1x per week to progress in vestibular rehab with x1 viewing progression to work on vestibular hypofunctioning. Body Structures/Functions/Activity Limitations:impaired activity tolerance and vestibular rehab: vertigo/dizziness. Related to head movements bending forward. Prognosis: good    GOALS:  Patient Goal: To have no dizziness and no spinning sensations in her head. Be able to have full activity level at work and household activities. Short Term Goals:  Time Frame: 4 weeks  Patient to demonstrate consistently negative testing for BPPV. Patient to demonstrate negative testing for smooth pursuit and saccades with left eye. 3. Patient able to complete full work activities bending forward, turning her head/neck without symptoms of feeling off balance and \"spinning in her head\"   4. Dizziness Handicap Inventory from 36/100 to 12/100    Long Term Goals:  Time Frame: 8 weeks  Dizziness Handicap Inventory from 12/100 to 5/100. Patient independent in HEP of x1 viewing ex, and progression in the exercises with improved vestibular function. Patient Education:   [x]  HEP/Education Completed: Plan of Care, Goals, x1 viewing ex seated, no Meclizine medication > 24 hours prior to PT session for testing for BPPV.    [employer] Access Code:  []  No new Education completed  []  Reviewed Prior HEP      []  Patient verbalized and/or demonstrated understanding of education provided. []  Patient unable to verbalize and/or demonstrate understanding of education provided. Will continue education. [x]  Barriers to learning: none    PLAN:  Treatment Recommendations: Vestibular Rehabilitation    []  Plan of care initiated. Plan to see patient 1 times per week for 8-12 weeks to address the treatment planned outlined above.   [x]  Continue with current plan of care  []  Modify plan of care as follows:    []  Hold pending physician visit  []  Discharge    Time In 1445   Time Out 1509   Timed Code Minutes: 24   Total Treatment Time: 24       Electronically Signed by: Zulay Garcia, PT

## 2022-10-28 ENCOUNTER — OFFICE VISIT (OUTPATIENT)
Dept: FAMILY MEDICINE CLINIC | Age: 62
End: 2022-10-28
Payer: MEDICARE

## 2022-10-28 ENCOUNTER — APPOINTMENT (OUTPATIENT)
Dept: PHYSICAL THERAPY | Age: 62
End: 2022-10-28
Payer: MEDICARE

## 2022-10-28 VITALS
HEART RATE: 90 BPM | BODY MASS INDEX: 31.87 KG/M2 | HEIGHT: 62 IN | RESPIRATION RATE: 16 BRPM | SYSTOLIC BLOOD PRESSURE: 130 MMHG | TEMPERATURE: 97.8 F | WEIGHT: 173.2 LBS | OXYGEN SATURATION: 99 % | DIASTOLIC BLOOD PRESSURE: 66 MMHG

## 2022-10-28 DIAGNOSIS — T78.40XA ALLERGIC REACTION, INITIAL ENCOUNTER: Primary | ICD-10-CM

## 2022-10-28 PROCEDURE — 3074F SYST BP LT 130 MM HG: CPT | Performed by: FAMILY MEDICINE

## 2022-10-28 PROCEDURE — G8427 DOCREV CUR MEDS BY ELIG CLIN: HCPCS | Performed by: FAMILY MEDICINE

## 2022-10-28 PROCEDURE — 1036F TOBACCO NON-USER: CPT | Performed by: FAMILY MEDICINE

## 2022-10-28 PROCEDURE — 3078F DIAST BP <80 MM HG: CPT | Performed by: FAMILY MEDICINE

## 2022-10-28 PROCEDURE — G8482 FLU IMMUNIZE ORDER/ADMIN: HCPCS | Performed by: FAMILY MEDICINE

## 2022-10-28 PROCEDURE — G8417 CALC BMI ABV UP PARAM F/U: HCPCS | Performed by: FAMILY MEDICINE

## 2022-10-28 PROCEDURE — 3017F COLORECTAL CA SCREEN DOC REV: CPT | Performed by: FAMILY MEDICINE

## 2022-10-28 PROCEDURE — 99213 OFFICE O/P EST LOW 20 MIN: CPT | Performed by: FAMILY MEDICINE

## 2022-10-28 RX ORDER — HYDROXYZINE HYDROCHLORIDE 25 MG/1
25 TABLET, FILM COATED ORAL EVERY 8 HOURS PRN
Qty: 30 TABLET | Refills: 0 | Status: SHIPPED | OUTPATIENT
Start: 2022-10-28 | End: 2022-11-07

## 2022-10-28 RX ORDER — PREDNISONE 10 MG/1
TABLET ORAL
Qty: 40 TABLET | Refills: 0 | Status: SHIPPED | OUTPATIENT
Start: 2022-10-28 | End: 2022-11-21 | Stop reason: SDUPTHER

## 2022-10-28 NOTE — PROGRESS NOTES
SUBJECTIVE:  Gayle Jones is a 60 y.o. female for   Chief Complaint   Patient presents with    Facial Swelling       both eyelid Complaint      Symptoms have been present for 3 day(s)  Inciting Event or Trauma - Yes - may have been exposed to some cleaning chemicals    Redness - No  Burning - No  Matting or Drainage - No  Changes in vision - No  Associated symptoms - facial tightness, no oral swelling,   Has noted diffuse erythema of the face, has hx of allergic reactions    Patient Active Problem List   Diagnosis    Dyslipidemia    Hypertension, essential    Surgical menopause    Statin intolerance    Epidermal cyst of face    Bilateral temporomandibular joint pain    Chronic venous insufficiency of lower extremity    Venous stasis dermatitis of both lower extremities    DM2 (diabetes mellitus, type 2) ([facility])    Obesity (BMI 30-39. 9)    Hypothyroidism          OBJECTIVE:  /66   Pulse 90   Temp 97.8 °F (36.6 °C) (Oral)   Resp 16   Ht 5' 2\" (1.575 m)   Wt 173 lb 3.2 oz (78.6 kg)   SpO2 99%   BMI 31.68 kg/m²   She appears well; non-toxic and in no apparent distress. HEENT -  Eyes: Lids - mildly edematous  conjunctivae: clear PERRL. No periorbital cellulitis. The corneas are clear. Visual acuity normal. No foreign objects identified. Nasal mucosa normal and patent, mucous membranes moist, pharynx normal without lesions, neck supple without masses   Skin exam - diffuse erythematous patch of the cheeks, forehead and nasal area  Psych:  Affect appropriate. Thought process is normal without evidence of depression or psychosis. Good insight and appropriae interaction. Cognition and memory appear to be intact. ASSESSMENT & PLAN  Seven Jimenez was seen today for facial swelling. Diagnoses and all orders for this visit:    Allergic reaction, initial encounter  -     predniSONE (DELTASONE) 10 MG tablet;  Take 4 tabs PO x 4 days, then take 3 tabs PO x 4 days, then take 2 tabs PO x 4 days, then take 1 tab PO x 4 days  -     hydrOXYzine HCl (ATARAX) 25 MG tablet; Take 1 tablet by mouth every 8 hours as needed for Itching      Return if symptoms worsen or fail to improve.         -Start above treatments  -Patient advised on conservative treatment including OTC meds  -Patient advised to contact our office immediately if symptoms worsen or persist    All patient questions answered. Patient voiced understanding.

## 2022-11-04 ENCOUNTER — HOSPITAL ENCOUNTER (OUTPATIENT)
Dept: PHYSICAL THERAPY | Age: 62
Setting detail: THERAPIES SERIES
Discharge: HOME | End: 2022-11-04
Payer: MEDICARE

## 2022-11-04 PROCEDURE — 97164 PT RE-EVAL EST PLAN CARE: CPT

## 2022-11-04 PROCEDURE — 97112 NEUROMUSCULAR REEDUCATION: CPT

## 2022-11-04 NOTE — DISCHARGE SUMMARY
Freda Nguyễn 59  PHYSICAL THERAPY  [] VESTIBULAR EVALUATION  [] DAILY NOTE [] PROGRESS NOTE [x] DISCHARGE NOTE    [x] [facility] [place]   [] [place] [address]    [] [facility]   [] Derik Rodriguez    Date: 2022  Patient Name:  Sivakumar Cedeno  : 1960  MRN: [id number]  CSN: [id number]    Referring Practitioner Germán Rizzo,    Diagnosis Benign paroxysmal positional vertigo due to bilateral vestibular disorder [H81.13]    Treatment Diagnosis Vestibular hypofunction,    Date of Evaluation 10/12/22   Additional Pertinent History HTN, DM type 2, obesity, bilateral TKR, chronic venous insufficiency BLE. Recent onset of vertigo, cervical spur- cannot extend neck without \"passing out\"       Functional Outcome Measure Used Dizziness Handicap Inventory   Functional Outcome Score 36/100  (10/12/22) Evaluation  0/100 (2022)  discharge      Insurance: Primary: Payor: [employer] /  /  / ,   Secondary:    Authorization Information: PRECERTIFICATION REQUIRED:  n/a  INSURANCE THERAPY BENEFIT:  Allowed 30 visits Physical Therapy /Occupational Therapy/Speech Therapy per calendar year. No visit limit for PT/OT/ST for patients age 8 and under. FCE-Covered, no precert required. Benefit will not cover maintenance or preventative treatment. AQUATIC THERAPY COVERED:   Yes  MODALITIES COVERED:  Yes. Iontophoresis and Hot/Cold Packs are not covered. TELEHEALTH COVERED: Yes  REFERENCE #: Visit # 3, 010 for progress note Discharge 2022   Visits Allowed: 27   Recertification Date:    Physician Follow-Up:    Physician Orders: Vestibular rehab   History of Present Illness: Patient reports that she has had symptoms of spinning/moving with position changes bending forward, 1x getting in and out of bed. Notes inside her head uncomfortable and does not want to have quick head movement due to fear of triggering symptoms.  These symptoms have been going on for past 2 weeks. She does have history sinus issues, allergies sensitivity like dust and hay fever symptoms-generic Claritin and generic Singular are taken and then also Benadryl every night before bedtime. This is the 2nd time she has had these vertigo episodes. Possible around 2015 when she was treated by Doni Lares, PT vestibular specialist. At that time more episodic and not constant like right now. Denies of any falls. Notes sometimes wobble or feels unsteady      SUBJECTIVE: Patient reports that she has been completing the x1 viewing ex about 1x a day now or not at all due to feeling better. Having no symptoms of feeling off balance or unsteadiness, No dizziness or spinning. Precautions:  Neck with cervical spur and cannot extend neck \"passes out\"      Oculomotor/VOR Reassessed on 2nd visit 10/17/2022  Oculomotor Testing (with fixation) Left Right Comments/Observations/Symptoms   Spontaneous Nystagmus - -    Smooth Pursuit - -    Saccades - -    Head Thrust - -        Normal Oculomotor exam today. Normal for BPPV testing with right/left [facility] testing in previous sessions         TREATMENT   Precautions:    Pain:     X in shaded column indicates activity completed today   Modalities Parameters/  Location  Notes                     Manual Therapy Time/Technique  Notes                     Exercise/Intervention   Notes   Vestibular Education:   BPPV education, Vestibular hypofunction education, x1 viewing exercises  handout issued. Post Maneuver Precautions:             X1 viewing exercises with head nods and rotation for 1 minute each,     Standing position with narrow base of support           Full field grid narrow stance  x1 viewing ex 60 seconds each  x                  HEP review. x           Reassessment  12 minutes  x      Specific Interventions Next Treatment: Reassess for BPPV with patient holding on Meclizine, Roll test, [facility] testing reassess.  Vestibular hypofunction: x1 viewing exercises    Activity/Treatment Tolerance:  [x]  Patient tolerated treatment well  []  Patient limited by fatigue  []  Patient limited by pain   []  Patient limited by medical complications  []  Other:     Assessment: Recommending follow up with ENT if okay with Dr. Nithya Duran. Oculomotor exam and BPPV testing clear. Body Structures/Functions/Activity Limitations:impaired activity tolerance and vestibular rehab: vertigo/dizziness. Related to head movements bending forward. Prognosis: good    GOALS:  Patient Goal: To have no dizziness and no spinning sensations in her head. Be able to have full activity level at work and household activities. Short Term Goals:  Time Frame: 4 weeks  Patient to demonstrate consistently negative testing for BPPV. GOAL MET negative testing for BPPV  Patient to demonstrate negative testing for smooth pursuit and saccades with left eye. GOAL MET normal oculomotor exam with smooth pursuit and saccades. 3. Patient able to complete full work activities bending forward, turning her head/neck without symptoms of feeling off balance and \"spinning in her head\"   GOAL MET Patient tolerating full work activities and has no spinning or feeling off balance. 4. Dizziness Handicap Inventory from 36/100 to 12/100  GOAL MET DHI 0/100    Long Term Goals:  Time Frame: 8 weeks  Dizziness Handicap Inventory from 12/100 to 5/100. GOAL MET DHI 0/100  Patient independent in HEP of x1 viewing ex, and progression in the exercises with improved vestibular function. GOAL MET Independent in HEP. Progressed to Full field grid for x1 viewing ex in standing with narrow base of support. Patient Education:   [x]  HEP/Education Completed: Plan of Care, Goals, progressed to x1 viewing with full field grid. Reviewed ex to complete as needed.   At this time goals met and no further follow up /treatment needed in PT   [facility] Access Code:  []  No new Education completed  [] Reviewed Prior HEP      []  Patient verbalized and/or demonstrated understanding of education provided. []  Patient unable to verbalize and/or demonstrate understanding of education provided. Will continue education. [x]  Barriers to learning: none    PLAN:  Treatment Recommendations: Vestibular Rehabilitation    []  Plan of care initiated. Plan to see patient 1 times per week for 8-12 weeks to address the treatment planned outlined above.   []  Continue with current plan of care  []  Modify plan of care as follows:    []  Hold pending physician visit  [x]  Discharge    Time In 0958   Time Out 1022   Timed Code Minutes: 15   Total Treatment Time: 24       Electronically Signed by: Louie Noel, PT

## 2022-11-11 ENCOUNTER — HOSPITAL ENCOUNTER (OUTPATIENT)
Dept: PHYSICAL THERAPY | Age: 62
Setting detail: THERAPIES SERIES
End: 2022-11-11
Payer: MEDICARE

## 2022-11-21 ENCOUNTER — TELEPHONE (OUTPATIENT)
Dept: FAMILY MEDICINE CLINIC | Age: 62
End: 2022-11-21

## 2022-11-21 ENCOUNTER — OFFICE VISIT (OUTPATIENT)
Dept: FAMILY MEDICINE CLINIC | Age: 62
End: 2022-11-21
Payer: MEDICARE

## 2022-11-21 VITALS
TEMPERATURE: 97.8 F | HEART RATE: 68 BPM | SYSTOLIC BLOOD PRESSURE: 130 MMHG | BODY MASS INDEX: 31.93 KG/M2 | HEIGHT: 62 IN | DIASTOLIC BLOOD PRESSURE: 68 MMHG | RESPIRATION RATE: 16 BRPM | OXYGEN SATURATION: 98 % | WEIGHT: 173.5 LBS

## 2022-11-21 DIAGNOSIS — M54.9 UPPER BACK PAIN: ICD-10-CM

## 2022-11-21 DIAGNOSIS — T78.40XA ALLERGIC REACTION, INITIAL ENCOUNTER: Primary | ICD-10-CM

## 2022-11-21 PROCEDURE — 3017F COLORECTAL CA SCREEN DOC REV: CPT | Performed by: FAMILY MEDICINE

## 2022-11-21 PROCEDURE — 1036F TOBACCO NON-USER: CPT | Performed by: FAMILY MEDICINE

## 2022-11-21 PROCEDURE — 99214 OFFICE O/P EST MOD 30 MIN: CPT | Performed by: FAMILY MEDICINE

## 2022-11-21 PROCEDURE — G8417 CALC BMI ABV UP PARAM F/U: HCPCS | Performed by: FAMILY MEDICINE

## 2022-11-21 PROCEDURE — G8482 FLU IMMUNIZE ORDER/ADMIN: HCPCS | Performed by: FAMILY MEDICINE

## 2022-11-21 PROCEDURE — 3078F DIAST BP <80 MM HG: CPT | Performed by: FAMILY MEDICINE

## 2022-11-21 PROCEDURE — G8427 DOCREV CUR MEDS BY ELIG CLIN: HCPCS | Performed by: FAMILY MEDICINE

## 2022-11-21 PROCEDURE — 3074F SYST BP LT 130 MM HG: CPT | Performed by: FAMILY MEDICINE

## 2022-11-21 RX ORDER — PREDNISONE 10 MG/1
TABLET ORAL
Qty: 30 TABLET | Refills: 0 | Status: SHIPPED | OUTPATIENT
Start: 2022-11-21 | End: 2022-12-07

## 2022-11-21 RX ORDER — FAMOTIDINE 20 MG/1
20 TABLET, FILM COATED ORAL 2 TIMES DAILY
Qty: 28 TABLET | Refills: 0 | Status: SHIPPED | OUTPATIENT
Start: 2022-11-21 | End: 2022-12-05

## 2022-11-21 NOTE — TELEPHONE ENCOUNTER
Patient seen in [address] [place] [address],[address] last week for swollen eyes which got better but have come back worse  I put the patient on the schedule   Future Appointments   Date Time Provider Tana Gracia   11/22/2022 11:20 AM [address]   2/9/2023 11:00 AM [address]   10/19/2023  1:15 PM Willey Lombard, MD [address] [facility]     If this day and time is not allowed please let me know and I will contact the patient back to R/S

## 2022-11-21 NOTE — TELEPHONE ENCOUNTER
Future Appointments   Date Time Provider Tana Samia   11/21/2022  3:20 PM Juvenal Langley DO [address] [place] [address] - [place] [address]   2/9/2023 11:00 AM Juvenal Langley DO [facility] - [place] [address]   10/19/2023  1:15 PM Daniel Hawk MD [facility] - [place] [address]     Patient has been informed and voiced understanding

## 2022-11-21 NOTE — PROGRESS NOTES
Chief Complaint   Patient presents with    Other     Swollen eye lids    Back Pain     Thoracic       History obtained from the patient. SUBJECTIVE:  Lamont Nunez is a 60 y.o. female that presents today     -Eye lid swelling:  Saw Dr. Karena Turner for this 10/28  Treated with steroids and sxs resolved  However, returned the last few days as came off steroids about a wk ago  Having bilat eye lid swelling with facial swelling and feeling warm in the cheeks. No vision changes  No air way issues or swallowing issues  No lip swelling  No new meds, soaps or detergents  Pt thinks it's something at work  On benadryl, claritin and singular already      -Upper Back Pain:    HPI:   Chornic issue from work  Worse the last 2 wks with numbness in small area across the upper back  No pain down the back  No trauma  Pain is mild    Inciting injury or history of trauma? No  Pain is relieved by - rest  Pain is aggravated by - movement, bending  Radiation of the pain? No  Paresthesias of the extremities? No  Saddle anesthesia? No  Bowel or bladder incontinence? No  Treatments tried - none      Age/Gender Health Maintenance    Lipid -   Lab Results   Component Value Date    CHOL 181 09/19/2022    CHOL 193 07/28/2022    CHOL 193 07/28/2022     Lab Results   Component Value Date    TRIG 165 (H) 09/19/2022    TRIG 158 (H) 07/28/2022    TRIG 158 07/28/2022     Lab Results   Component Value Date    HDL 67 09/19/2022    HDL 70 07/28/2022    HDL 70 07/28/2022     Lab Results   Component Value Date    LDLCALC 81 09/19/2022    LDLCALC 91 07/28/2022    LDLCALC 91 07/28/2022       Colon Cancer Screening - Small polyp OCT 2013, repeat 7 to 10 years, per Sheik DE LA CRUZ. Lung Cancer Screening - never soker    Tetanus - UTD AUG 2021  Influenza Vaccine - UTD FALL 2022  Pneumonia Vaccine - UTD AUG 2021  Zoster - UTD x 2    Breast Cancer Screening - NEG AUG 2022  Cervical Cancer Screening - hyst for benign reasons.    Osteoporosis Screening - neg June 2021      Diabetes Health Maintenance    A1C -   Lab Results   Component Value Date/Time    LABA1C 6.1 02/08/2022 09:36 AM    LABA1C 6.3 08/05/2021 12:47 PM    LABA1C 7.1 05/13/2021 12:00 AM       ACE/ARB - yes, cozaar  Eye - UTD JAN 2022  Foot - UTD AUG 2022    ASA - no  Microal/Cr -   Lab Results   Component Value Date    LABMICR < 1.20 02/08/2022    LABCREA 215.8 02/08/2022    MACRR 6 02/08/2022     Lab Results   Component Value Date    CREATINEUR 103.9 07/28/2022    MICROALBUR <12.0 07/28/2022       eGFR - >60, June 2021    No results found for: GFRESTIMATE  Lab Results   Component Value Date/Time    LABGLOM 80 07/28/2022 12:00 AM     No results found for: Joy Mo  Lab Results   Component Value Date/Time    EGFRNONAA 80 07/28/2022 11:30 AM     No results found for: EGFRAA      Statin - no, intolerant      Current Outpatient Medications   Medication Sig Dispense Refill    predniSONE (DELTASONE) 10 MG tablet 4 tabs PO ONCE Daily x 4 days, then 2 tabs PO ONCE daily x 4 days, then 1 TAB PO ONCE daily x 4 days, then 1/2 TAB PO ONCE daily x 4 days 30 tablet 0    famotidine (PEPCID) 20 MG tablet Take 1 tablet by mouth 2 times daily for 14 days 28 tablet 0    levothyroxine (SYNTHROID) 25 MCG tablet TAKE 1 TABLET BY MOUTH EVERY DAY 30 tablet 3    meclizine (ANTIVERT) 25 MG tablet Take 1 tablet by mouth 3 times daily as needed for Dizziness 45 tablet 0    ezetimibe (ZETIA) 10 MG tablet TAKE 1 TABLET BY MOUTH EVERY DAY 90 tablet 3    metFORMIN (GLUCOPHAGE) 500 MG tablet TAKE 1 TABLET BY MOUTH TWICE A DAY WITH MEALS 180 tablet 3    alirocumab (PRALUENT) 75 MG/ML SOAJ injection pen Inject 1 mL into the skin every 14 days 6 pen 3    cyclobenzaprine (FLEXERIL) 10 MG tablet Take 1 tablet by mouth 3 times daily as needed for Muscle spasms 90 tablet 0    losartan-hydroCHLOROthiazide (HYZAAR) 100-12.5 MG per tablet Take 1 tablet by mouth daily TAKE 1 TABLET BY MOUTH EVERY DAY 90 tablet 3    montelukast (SINGULAIR) 10 MG tablet Take 1 tablet by mouth nightly 90 tablet 3    estrogens, conjugated, (PREMARIN) 0.3 MG tablet Take 1 tablet by mouth daily 90 tablet 3    diphenhydrAMINE (BENADRYL) 25 MG tablet Take 25 mg by mouth every 6 hours as needed for Itching      loratadine (CLARITIN) 10 MG tablet Take 10 mg by mouth daily      betamethasone valerate (VALISONE) 0.1 % lotion Apply topically 2 times daily. 60 mL 0    Omega-3 Fatty Acids (FISH OIL) 1000 MG CAPS Take 2,000 mg by mouth daily       No current facility-administered medications for this visit. Orders Placed This Encounter   Medications    predniSONE (DELTASONE) 10 MG tablet     Si tabs PO ONCE Daily x 4 days, then 2 tabs PO ONCE daily x 4 days, then 1 TAB PO ONCE daily x 4 days, then 1/2 TAB PO ONCE daily x 4 days     Dispense:  30 tablet     Refill:  0    famotidine (PEPCID) 20 MG tablet     Sig: Take 1 tablet by mouth 2 times daily for 14 days     Dispense:  28 tablet     Refill:  0               All medications reviewed and reconciled, including OTC and herbal medications. Updated list given to patient. Patient Active Problem List    Diagnosis Date Noted    Hypothyroidism     DM2 (diabetes mellitus, type 2) ([facility] [address]     Obesity (BMI 30-39.9)     Epidermal cyst of face 2021    Bilateral temporomandibular joint pain 2021    Chronic venous insufficiency of lower extremity 2021    Venous stasis dermatitis of both lower extremities 2021    Hypertension, essential     Surgical menopause     Statin intolerance      LFTS      Dyslipidemia        Past Medical History:   Diagnosis Date    Chronic venous insufficiency of lower extremity 2021    DM2 (diabetes mellitus, type 2) ([facility])     Dyslipidemia     Hypertension, essential     Hypothyroidism     Obesity (BMI 30-39. 9)     Statin intolerance     LFTS    Surgical menopause        Past Surgical History:   Procedure Laterality Date     SECTION       x 3     SECTION      x3 CHOLECYSTECTOMY      INTRACAPSULAR CATARACT EXTRACTION Left 10/13/2020    EXTRACAPSULAR CATARACT REMOVAL WITH INSERTION OF INTRAOCULAR LENS PROTHESIS LEFT performed by Maurice Santoro MD at [address] [facility] [place] Bilateral     knees    JEANINE AND BSO (CERVIX REMOVED)  04/1986    Endometriosis. Allergies   Allergen Reactions    Statins      Liver issues    Valium [Diazepam] Other (See Comments)     Patient gets very irritated       Social History     Tobacco Use    Smoking status: Never    Smokeless tobacco: Never   Substance Use Topics    Alcohol use: No       Family History   Problem Relation Age of Onset    Diabetes Mother     High Blood Pressure Father     Heart Disease Father     Breast Cancer Other         1/2 sister    Colon Cancer Neg Hx          I have reviewed the patient's past medical history, past surgical history, allergies, medications, social and family history and I have made updates where appropriate.       Review of Systems  Positive responses are highlighted in bold    Constitutional:  Fever, Chills, Night Sweats, Fatigue, Unexpected changes in weight  HENT:  Ear pain, Tinnitus, Nosebleeds, Trouble swallowing, Hearing loss, Sore throat  Cardiovascular:  Chest Pain, Palpitations, Orthopnea, Paroxysmal Nocturnal Dyspnea  Respiratory:  Cough, Wheezing, Shortness of breath, Chest tightness, Apnea  Gastrointestinal:  Nausea, Vomiting, Diarrhea, Constipation, Heartburn, Blood in stool  Genitourinary:  Difficulty or painful urination, Flank pain, Change in frequency, Urgency  Skin:  Color change, Rash, Itching, Wound  Musculoskeletal:  Joint pain, Back pain, Gait problems, Joint swelling, Myalgias  Neurological:  Dizziness, Headaches, Presyncope, Numbness, Seizures, Tremors  Endocrine:  Heat Intolerance, Cold Intolerance, Polydipsia, Polyphagia, Polyuria      PHYSICAL EXAM:  Vitals:    11/21/22 1514   BP: 130/68   Site: Right Upper Arm   Position: Sitting   Cuff Size: Large Adult Pulse: 68   Resp: 16   Temp: 97.8 °F (36.6 °C)   SpO2: 98%   Weight: 173 lb 8 oz (78.7 kg)   Height: 5' 2\" (1.575 m)     Body mass index is 31.73 kg/m². VS Reviewed  General Appearance: A&O x 3, No acute distress,well developed and well- nourished  Eyes: pupils equal, round, and reactive to light, extraocular eye movements intact, conjunctivae and eye lids without erythema but with inc swelling on lids and cheeks. ENT: external ear and ear canal clear bilaterally, TMs intact and regular, nose without deformity, nasal mucosa and turbinates normal without polyps, oropharynx normal, dentition is normal for age. No lip swelling. Neck: supple and non-tender without mass, no thyromegaly or thyroid nodules, no cervical lymphadenopathy  Pulmonary/Chest: clear to auscultation bilaterally- no wheezes, rales or rhonchi, normal air movement, no respiratory distress or retractions  Cardiovascular: S1 and S2 auscultated w/ RRR. No murmurs, rubs, clicks, or gallops, distal pulses intact. Abdomen: soft, non-tender, non-distended, bowel sounds physiologic,  no rebound or guarding, no masses or hernias noted. Liver and spleen without enlargement. Extremities: no cyanosis, clubbing or edema of the lower extremities. Skin: warm and dry, no rash or erythema  THORACIC SPINE EXAM:  Examination of the Thoracic Spine shows:  Deformity is not noted. Soft Tissue Swelling is not noted. Erythema is not noted. Paraspinal Spasm is  noted. Tenderness to palpation is not noted. Sensation mild dec sensation across upper thoracic spine bilat. Flexion does not produce pain. Extension does not produce pain. ASSESSMENT & PLAN  1. Allergic reaction, initial encounter    Unclear etiology  Did respond to steroids prior  Will resume steroid taper  Con't claritin, bendryl  Add pepcid  Labs below  Allergy referral  F/u if no better  Reviewed ER precautions, pt understands.      - CBC with Auto Differential; Future  - Comprehensive Metabolic Panel; Future  - Sedimentation Rate; Future  - C-Reactive Protein; Future  - predniSONE (DELTASONE) 10 MG tablet; 4 tabs PO ONCE Daily x 4 days, then 2 tabs PO ONCE daily x 4 days, then 1 TAB PO ONCE daily x 4 days, then 1/2 TAB PO ONCE daily x 4 days  Dispense: 30 tablet; Refill: 0  - famotidine (PEPCID) 20 MG tablet; Take 1 tablet by mouth 2 times daily for 14 days  Dispense: 28 tablet; Refill: [address], [place], [address], Allergy, [facility]    2. Upper back pain    Reassuring exam  Con't prn flexeril  Given HEP  If no better in 4 wks, or worse before then, f/u in office      DISPOSITION    Return if symptoms worsen or fail to improve. Mchugh released without restrictions. Future Appointments   Date Time Provider Tana Gracia   2/9/2023 11:00 AM [id number] [facility]   10/19/2023  1:15 PM Amanda Christian MD N [facility] - [address] [place] [address]     PATIENT COUNSELING    Barriers to learning and self management: none    Discussed use, benefit, and side effects of prescribed medications. Barriers to medication compliance addressed. All patient questions answered. Pt voiced understanding.        Electronically signed by Shirlene Lanes, DO on 11/21/2022 at 3:38 PM

## 2022-11-22 DIAGNOSIS — E89.40 SURGICAL MENOPAUSE: ICD-10-CM

## 2022-11-22 DIAGNOSIS — N95.1 VASOMOTOR SYMPTOMS DUE TO MENOPAUSE: ICD-10-CM

## 2022-11-23 ENCOUNTER — PATIENT MESSAGE (OUTPATIENT)
Dept: FAMILY MEDICINE CLINIC | Age: 62
End: 2022-11-23

## 2022-11-23 DIAGNOSIS — T78.40XA ALLERGIC REACTION, INITIAL ENCOUNTER: Primary | ICD-10-CM

## 2022-11-23 DIAGNOSIS — N95.1 VASOMOTOR SYMPTOMS DUE TO MENOPAUSE: ICD-10-CM

## 2022-11-23 DIAGNOSIS — E89.40 SURGICAL MENOPAUSE: ICD-10-CM

## 2022-11-23 RX ORDER — CONJUGATED ESTROGENS 0.3 MG/1
TABLET, FILM COATED ORAL
Qty: 90 TABLET | Refills: 3 | Status: SHIPPED | OUTPATIENT
Start: 2022-11-23

## 2022-11-23 NOTE — TELEPHONE ENCOUNTER
Recent Visits  Date Type Provider Dept   11/21/22 Office Visit Yves Dallas, DO [facility]   10/28/22 Office Visit Magdalena Locke[facility]   10/11/22 Office Visit Yves Hodgeis, [place] [facility]   08/08/22 Office Visit Yves Haynes, [place] [facility]   05/23/22 Office Visit Yves Haynes, DO [facility]   02/21/22 Office Visit Yves Haynes, DO [facility]   02/08/22 Office Visit Yves Haynes, DO [facility]   12/20/21 Office Visit Yves Haynes, DO [facility]   11/05/21 Office Visit Yves Haynes, DO [facility]   08/05/21 Office Visit Yves Haynes, DO [facility]   Showing recent visits within past 540 days with a meds authorizing provider and meeting all other requirements  Future Appointments  Date Type Provider Dept   02/09/23 Appointment Yves Haynes, DO [facility]   Showing future appointments within next 150 days with a meds authorizing provider and meeting all other requirements     Future Appointments   Date Time Provider Tana Gracia   2/9/2023 11:00 AM [address]   10/19/2023  1:15 PM Meghna Valentin MD [facility] - [place]

## 2022-11-25 NOTE — TELEPHONE ENCOUNTER
From: Danley Dubin  To: Dr. Sesay Nab: 11/23/2022 11:51 AM EST  Subject: Allergist referral    Hi Doc! The referral you gave me said their first opening is Sept 2023. ..just a smidge too far away! I was just wondering if you had someone else in mind.   Thanks so much,   Charley Tavera

## 2022-11-30 DIAGNOSIS — J30.2 SEASONAL ALLERGIES: ICD-10-CM

## 2022-11-30 RX ORDER — MONTELUKAST SODIUM 10 MG/1
10 TABLET ORAL NIGHTLY
Qty: 90 TABLET | Refills: 3 | Status: SHIPPED | OUTPATIENT
Start: 2022-11-30

## 2022-11-30 NOTE — TELEPHONE ENCOUNTER
Recent Visits  Date Type Provider Dept   11/21/22 Office Visit Juvenal Langley, DO [facility]   10/28/22 Office Visit Fernando [address] [facility]   10/11/22 Office Visit Juvenal Langley, [address] [facility]   08/08/22 Office Visit Juvenal Langley, [address] [facility]   05/23/22 Office Visit Juvenal Langley, DO [facility]   02/21/22 Office Visit Juvenal Langley, DO [facility]   02/08/22 Office Visit Juvenal Langley, DO [facility]   12/20/21 Office Visit Juvenal Lanlgey, DO [facility]   11/05/21 Office Visit Juvenal Langley, DO [facility]   08/05/21 Office Visit Juvenal Langley, DO [facility]   Showing recent visits within past 540 days with a meds authorizing provider and meeting all other requirements  Future Appointments  Date Type Provider Dept   02/09/23 Appointment Juvenal Langley, DO [facility]   Showing future appointments within next 150 days with a meds authorizing provider and meeting all other requirements    Future Appointments   Date Time Provider Tana Gracia   2/9/2023 11:00 AM [address]   10/19/2023  1:15 PM Daniel Hawk MD N [facility] - [place]

## 2022-12-11 DIAGNOSIS — E06.3 HYPOTHYROIDISM DUE TO HASHIMOTO'S THYROIDITIS: Primary | Chronic | ICD-10-CM

## 2022-12-11 DIAGNOSIS — E03.8 HYPOTHYROIDISM DUE TO HASHIMOTO'S THYROIDITIS: Primary | Chronic | ICD-10-CM

## 2022-12-11 RX ORDER — LEVOTHYROXINE SODIUM 0.05 MG/1
50 TABLET ORAL DAILY
Qty: 30 TABLET | Refills: 3 | Status: SHIPPED | OUTPATIENT
Start: 2022-12-11

## 2022-12-12 ENCOUNTER — TELEPHONE (OUTPATIENT)
Dept: FAMILY MEDICINE CLINIC | Age: 62
End: 2022-12-12

## 2022-12-12 DIAGNOSIS — I10 HYPERTENSION, ESSENTIAL: ICD-10-CM

## 2022-12-12 RX ORDER — LOSARTAN POTASSIUM AND HYDROCHLOROTHIAZIDE 12.5; 1 MG/1; MG/1
1 TABLET ORAL DAILY
Qty: 90 TABLET | Refills: 3 | Status: SHIPPED | OUTPATIENT
Start: 2022-12-12

## 2022-12-12 NOTE — TELEPHONE ENCOUNTER
.re  Recent Visits  Date Type Provider Dept   11/21/22 Office Visit Thalia Garcia, DO [facility]   10/28/22 Office Visit Fernando [facility]   10/11/22 Office Visit Thalia Garcia, [address]   08/08/22 Office Visit Thaliatroy Garcia, [address]   05/23/22 Office Visit Thalia Buttner, DO [facility]   02/21/22 Office Visit Thalia Buttner, DO [facility]   02/08/22 Office Visit Thalia Buttner, DO [facility]   12/20/21 Office Visit Thalia Buttguera, DO [facility]   11/05/21 Office Visit Thalia Buttguera, DO [facility]   08/05/21 Office Visit Thalia Buttguera, DO [facility]   Showing recent visits within past 540 days with a meds authorizing provider and meeting all other requirements  Future Appointments  Date Type Provider Dept   02/09/23 Appointment Thalia Garcia, DO [facility]   Showing future appointments within next 150 days with a meds authorizing provider and meeting all other requirements    Future Appointments   Date Time Provider Tana Gracia   2/9/2023 11:00 AM [address]   10/19/2023  1:15 PM Renea Fletcher MD N [facility] - [place]

## 2022-12-12 NOTE — TELEPHONE ENCOUNTER
----- Message from Nanette Samayoa, DO sent at 12/11/2022  9:00 AM EST -----  Please let pt know that labs look good other than thyroid remains just a bit underactive (this is not related to her hives/allergic reaction). Recommend inc synthroid to 50 from 25mcg and repeat labs in 6 wks, non-fasting is ok  Allergy labs negative for inflammation etc. Con't with plan to see allergist.   Let me know if questions, thanks!

## 2022-12-26 DIAGNOSIS — T78.40XA ALLERGIC REACTION, INITIAL ENCOUNTER: ICD-10-CM

## 2022-12-27 RX ORDER — FAMOTIDINE 20 MG/1
TABLET, FILM COATED ORAL
Qty: 30 TABLET | Refills: 1 | Status: SHIPPED | OUTPATIENT
Start: 2022-12-27

## 2022-12-27 NOTE — TELEPHONE ENCOUNTER
Recent Visits  Date Type Provider Dept   11/21/22 Office Visit Marciano Giordano, DO [facility]   10/28/22 Office Visit Fernando [facility]   10/11/22 Office Visit Marciano Giordano, [place] [facility]   08/08/22 Office Visit Marciano Giordano, [place] [facility]   05/23/22 Office Visit Marciano Giordano, DO [facility]   02/21/22 Office Visit Marciano Giordano, DO [facility]   02/08/22 Office Visit Marciano Giordano, DO [facility]   12/20/21 Office Visit Marciano Giordano, DO [facility]   11/05/21 Office Visit Marciano Giordano, DO [facility]   08/05/21 Office Visit Marciano Giordano, DO [facility]   Showing recent visits within past 540 days with a meds authorizing provider and meeting all other requirements  Future Appointments  Date Type Provider Dept   02/09/23 Appointment Marciano Giordano, DO [facility]   Showing future appointments within next 150 days with a meds authorizing provider and meeting all other requirements     Future Appointments   Date Time Provider Tana Gracia   2/9/2023 11:00 AM [id number] [facility]   10/19/2023  1:15 PM Eladia Ivy MD N [facility] - [facility]

## 2023-01-11 DIAGNOSIS — E03.8 HYPOTHYROIDISM DUE TO HASHIMOTO'S THYROIDITIS: Chronic | ICD-10-CM

## 2023-01-11 DIAGNOSIS — E06.3 HYPOTHYROIDISM DUE TO HASHIMOTO'S THYROIDITIS: Chronic | ICD-10-CM

## 2023-01-11 RX ORDER — LEVOTHYROXINE SODIUM 0.05 MG/1
TABLET ORAL
Qty: 90 TABLET | Refills: 3 | Status: SHIPPED | OUTPATIENT
Start: 2023-01-11

## 2023-01-11 NOTE — TELEPHONE ENCOUNTER
Recent Visits  Date Type Provider Dept   11/21/22 Office Visit Bossman Mccabe, DO [facility]   10/28/22 Office Visit James [facility]   10/11/22 Office Visit Bossman Eliot, [place] [facility]   08/08/22 Office Visit Bossman Mccabe, [place] [facility]   05/23/22 Office Visit Bossman Eliot, DO [facility]   02/21/22 Office Visit Bossman Mccabe, DO [facility]   02/08/22 Office Visit Bossman Eliot, DO [facility]   12/20/21 Office Visit Bossman Eliot, DO [facility]   11/05/21 Office Visit Bossman Mccabe, DO [facility]   08/05/21 Office Visit Bossman Mccabe, DO [facility]   Showing recent visits within past 540 days with a meds authorizing provider and meeting all other requirements  Future Appointments  Date Type Provider Dept   02/09/23 Appointment Bossman Mccabe, DO [facility]   Showing future appointments within next 150 days with a meds authorizing provider and meeting all other requirements      Future Appointments   Date Time Provider Tana Gracia   2/9/2023 11:00 AM [address]   10/19/2023  1:15 PM Adams Pérez MD [facility] - [place]

## 2023-02-08 RX ORDER — FEXOFENADINE HCL 180 MG/1
TABLET ORAL
COMMUNITY
Start: 2023-01-13

## 2023-02-08 RX ORDER — MELOXICAM 15 MG/1
TABLET ORAL
COMMUNITY
Start: 2023-01-06

## 2023-02-08 NOTE — PROGRESS NOTES
Chief Complaint   Patient presents with    Follow-up     DM2 and Chronic issues noted below    Nail Problem     Toenails       History obtained from the patient. SUBJECTIVE:  Ac Rangel is a 60 y.o. female that presents today     -DM2: Dx end of May 2021  Started on metformin 500mg bid  A1c stable at 6.1      -HTN:    HPI:    Taking meds as prescribed ?: yes  Tolerating well ?: yes  Side Effects ?: denies  BP at home ?: <140/90  Working on TLCS ?: yes  Chest Pain/SOB/Palpitations? denies    BP Readings from Last 3 Encounters:   02/09/23 130/68   11/21/22 130/68   10/28/22 130/66       -HLD:    HPI:  Intolerant of statins  On zetia and Praluent    Taking meds as prescribed ?: yes  Tolerating well ?: yes  Side Effects ?: denies  Muscle Pain?: denies  Working on TLCS ?: yes      -Surgical menopause: On premarin  Unable to come off completely d/t hot flashes  On lowest dose      -Hypothyroidism:     HPI:    Currently treated for Hypothyroidism? Yes  Fatigue? No  Recent change in weight? No  Cold/Heat intolerance? No  Diarrhea/Constipation? No  Diaphoresis? No  Anxiety? No  Palpitations? No   Hair Loss? No    Lab Results   Component Value Date    TSH 2.98 02/08/2023       -Toe nail issues:   Thick and yellowing of all toenails  Bilat feet  Asking what to do  No pain      Age/Gender Health Maintenance    Lipid -   Lab Results   Component Value Date    CHOL 181 09/19/2022    CHOL 193 07/28/2022    CHOL 193 07/28/2022     Lab Results   Component Value Date    TRIG 165 (H) 09/19/2022    TRIG 158 (H) 07/28/2022    TRIG 158 07/28/2022     Lab Results   Component Value Date    HDL 67 09/19/2022    HDL 70 07/28/2022    HDL 70 07/28/2022     Lab Results   Component Value Date    LDLCALC 81 09/19/2022    [address] 91 07/28/2022    LDLCALC 91 07/28/2022       TSH -   Lab Results   Component Value Date    TSH 2.98 02/08/2023       Colon Cancer Screening - Small polyp OCT 2013, repeat 7 to 10 years, per GI, .  Lung Cancer Screening - never soker    Tetanus - UTD AUG 2021  Influenza Vaccine - UTD FALL 2022  Pneumonia Vaccine - UTD AUG 2021/UTD PCV 20 in FEB 2023  Zoster - UTD x 2    Breast Cancer Screening - NEG AUG 2022  Cervical Cancer Screening - hyst for benign reasons.    Osteoporosis Screening - neg June 2021      Diabetes Health Maintenance    A1C -   Lab Results   Component Value Date/Time    LABA1C 6.1 02/09/2023 10:16 AM    LABA1C 6.1 02/08/2022 09:36 AM    LABA1C 6.3 08/05/2021 12:47 PM    LABA1C 7.1 05/13/2021 12:00 AM       ACE/ARB - yes, cozaar  Eye - UTD JAN 2023  Foot - UTD AUG 2022    ASA - no  Microal/Cr -   Lab Results   Component Value Date    LABMICR < 1.20 02/08/2022    LABCREA 215.8 02/08/2022    MACRR 6 02/08/2022     Lab Results   Component Value Date    CREATINEUR 103.9 07/28/2022    MICROALBUR <12.0 07/28/2022       eGFR - >60, June 2021    No results found for: GFRESTIMATE  Lab Results   Component Value Date/Time    LABGLOM 80 07/28/2022 12:00 AM     No results found for: Rosendo Toro  Lab Results   Component Value Date/Time    EGFRNONAA 76 12/09/2022 08:11 AM     No results found for: EGFRAA      Statin - no, intolerant      Current Outpatient Medications   Medication Sig Dispense Refill    terbinafine (LAMISIL) 250 MG tablet Take 1 tablet by mouth daily 84 tablet 0    fexofenadine (ALLEGRA) 180 MG tablet TAKE 1 TABLET BY MOUTH EVERY DAY IN THE MORNING      meloxicam (MOBIC) 15 MG tablet TAKE 1 TABLET BY MOUTH EVERY DAY      levothyroxine (SYNTHROID) 50 MCG tablet TAKE 1 TABLET BY MOUTH EVERY DAY 90 tablet 3    famotidine (PEPCID) 20 MG tablet TAKE 1 TABLET BY MOUTH TWO TIMES A DAY 30 tablet 1    losartan-hydroCHLOROthiazide (HYZAAR) 100-12.5 MG per tablet Take 1 tablet by mouth daily TAKE 1 TABLET BY MOUTH EVERY DAY 90 tablet 3    montelukast (SINGULAIR) 10 MG tablet TAKE 1 TABLET BY MOUTH NIGHTLY 90 tablet 3    PREMARIN 0.3 MG tablet TAKE 1 TABLET BY MOUTH EVERY DAY 90 tablet 3 meclizine (ANTIVERT) 25 MG tablet Take 1 tablet by mouth 3 times daily as needed for Dizziness 45 tablet 0    ezetimibe (ZETIA) 10 MG tablet TAKE 1 TABLET BY MOUTH EVERY DAY 90 tablet 3    metFORMIN (GLUCOPHAGE) 500 MG tablet TAKE 1 TABLET BY MOUTH TWICE A DAY WITH MEALS 180 tablet 3    alirocumab (PRALUENT) 75 MG/ML SOAJ injection pen Inject 1 mL into the skin every 14 days 6 pen 3    cyclobenzaprine (FLEXERIL) 10 MG tablet Take 1 tablet by mouth 3 times daily as needed for Muscle spasms 90 tablet 0    diphenhydrAMINE (BENADRYL) 25 MG tablet Take 25 mg by mouth every 6 hours as needed for Itching      loratadine (CLARITIN) 10 MG tablet Take 10 mg by mouth daily      betamethasone valerate (VALISONE) 0.1 % lotion Apply topically 2 times daily. 60 mL 0    Omega-3 Fatty Acids (FISH OIL) 1000 MG CAPS Take 2,000 mg by mouth daily       No current facility-administered medications for this visit. Orders Placed This Encounter   Medications    terbinafine (LAMISIL) 250 MG tablet     Sig: Take 1 tablet by mouth daily     Dispense:  84 tablet     Refill:  0         All medications reviewed and reconciled, including OTC and herbal medications. Updated list given to patient. Patient Active Problem List    Diagnosis Date Noted    Hypothyroidism     DM2 (diabetes mellitus, type 2) ([facility] [address]     Obesity (BMI 30-39.9)     Epidermal cyst of face 08/05/2021    Bilateral temporomandibular joint pain 08/05/2021    Chronic venous insufficiency of lower extremity 08/05/2021    Venous stasis dermatitis of both lower extremities 08/05/2021    Hypertension, essential     Surgical menopause     Statin intolerance      LFTS      Dyslipidemia        Past Medical History:   Diagnosis Date    Chronic venous insufficiency of lower extremity 8/5/2021    DM2 (diabetes mellitus, type 2) ([facility])     Dyslipidemia     Hypertension, essential     Hypothyroidism     Obesity (BMI 30-39. 9)     Statin intolerance     LFTS    Surgical menopause Past Surgical History:   Procedure Laterality Date     SECTION       x 3     SECTION      x3    CHOLECYSTECTOMY      INTRACAPSULAR CATARACT EXTRACTION Left 10/13/2020    EXTRACAPSULAR CATARACT REMOVAL WITH INSERTION OF INTRAOCULAR LENS PROTHESIS LEFT performed by Robbie Burch MD at [address] [facility] Bilateral     knees    JEANINE AND BSO (CERVIX REMOVED)  1986    Endometriosis. Allergies   Allergen Reactions    Statins      Liver issues    Valium [Diazepam] Other (See Comments)     Patient gets very irritated       Social History     Tobacco Use    Smoking status: Never    Smokeless tobacco: Never   Substance Use Topics    Alcohol use: No       Family History   Problem Relation Age of Onset    Diabetes Mother     High Blood Pressure Father     Heart Disease Father     Breast Cancer Other         1/2 sister    Colon Cancer Neg Hx          I have reviewed the patient's past medical history, past surgical history, allergies, medications, social and family history and I have made updates where appropriate.       Review of Systems  Positive responses are highlighted in bold    Constitutional:  Fever, Chills, Night Sweats, Fatigue, Unexpected changes in weight  HENT:  Ear pain, Tinnitus, Nosebleeds, Trouble swallowing, Hearing loss, Sore throat  Cardiovascular:  Chest Pain, Palpitations, Orthopnea, Paroxysmal Nocturnal Dyspnea  Respiratory:  Cough, Wheezing, Shortness of breath, Chest tightness, Apnea  Gastrointestinal:  Nausea, Vomiting, Diarrhea, Constipation, Heartburn, Blood in stool  Genitourinary:  Difficulty or painful urination, Flank pain, Change in frequency, Urgency  Skin:  Color change, Rash, Itching, Wound  Musculoskeletal:  Joint pain, Back pain, Gait problems, Joint swelling, Myalgias  Neurological:  Dizziness, Headaches, Presyncope, Numbness, Seizures, Tremors  Endocrine:  Heat Intolerance, Cold Intolerance, Polydipsia, Polyphagia, Polyuria      PHYSICAL EXAM:  Vitals:    02/09/23 1101   BP: 130/68   Pulse: 68   Resp: 16   Temp: 97.9 °F (36.6 °C)   SpO2: 98%   Weight: 170 lb 12.8 oz (77.5 kg)   Height: 5' 2.5\" (1.588 m)     Body mass index is 30.74 kg/m². VS Reviewed  General Appearance: A&O x 3, No acute distress,well developed and well- nourished  Eyes: pupils equal, round, and reactive to light, extraocular eye movements intact, conjunctivae and eye lids without erythema  ENT: bilateral TM normal without fluid or infection, neck without nodes, throat normal without erythema or exudate, sinuses nontender, post nasal drip noted, nasal mucosa congested, and Oropharynx clear, without erythema, exudate, or thrush. Neck: supple and non-tender without mass, no thyromegaly or thyroid nodules, no cervical lymphadenopathy  Pulmonary/Chest: clear to auscultation bilaterally- no wheezes, rales or rhonchi, normal air movement, no respiratory distress or retractions  Cardiovascular: S1 and S2 auscultated w/ RRR. No murmurs, rubs, clicks, or gallops, distal pulses intact. Abdomen: soft, non-tender, non-distended, bowel sounds physiologic,  no rebound or guarding, no masses or hernias noted. Liver and spleen without enlargement. Extremities: no cyanosis, clubbing or edema of the lower extremities. Skin: warm and dry, no rash or erythema  Feet: Yellowing, thick nails bilat feet. Results for POC orders placed in visit on 02/09/23   POCT glycosylated hemoglobin (Hb A1C)   Result Value Ref Range    Hemoglobin A1C 6.1 (H) 4.3 - 5.7 %       Lab Results   Component Value Date    WBC 5.4 12/09/2022    HGB 12.9 12/09/2022    HCT 39.1 12/09/2022    MCV 83.9 12/09/2022     12/09/2022     Lab Results   Component Value Date    ALT 13 12/09/2022    AST 17 12/09/2022    ALKPHOS 52 12/09/2022    BILITOT 0.3 12/09/2022       ASSESSMENT & PLAN  1.  Type 2 diabetes mellitus without complication, without long-term current use of insulin ([address]    Stable  At goal  Con't Meformin    - POCT glycosylated hemoglobin (Hb A1C)    2. Hypertension, essential    Stable  Con't Hyzaar    3. Dyslipidemia    Stable   Intolerant of statins, causes liver issues per pt  con't zetia and Praluent    4. Statin intolerance      5. Surgical menopause    Stable  con't estrogen  Unable to wean past 0.3mg previously    6. Vasomotor symptoms due to menopause    As above    7. Hypothyroidism due to Hashimoto's thyroiditis    Improved,  TSH in range  Con't synthroid  TSH in 3 months    - TSH with Reflex; Future    8. Onychomycosis of toenail    Recent CBC/LFT WNL  Lamisil x 12 wks  F/u if no better    - terbinafine (LAMISIL) 250 MG tablet; Take 1 tablet by mouth daily  Dispense: 84 tablet; Refill: 0    9. Need for pneumococcal vaccination    - Pneumococcal, PCV20, PREVNAR 21, (age 25 yrs+), IM, PF      DISPOSITION    Return in about 6 months (around 8/9/2023) for Follow-up Diabetes, follow-up on chronic medical conditions, sooner as needed. Mchugh released without restrictions. Future Appointments   Date Time Provider Tana Fieldsi   8/14/2023 10:20 AM [address]   10/19/2023  1:15 PM Ashley Gamino MD [facility]     PATIENT COUNSELING    Barriers to learning and self management: none    Discussed use, benefit, and side effects of prescribed medications. Barriers to medication compliance addressed. All patient questions answered. Pt voiced understanding.        Electronically signed by Agnieszka Metzger DO on 2/9/2023 at 11:41 AM

## 2023-02-09 ENCOUNTER — OFFICE VISIT (OUTPATIENT)
Dept: FAMILY MEDICINE CLINIC | Age: 63
End: 2023-02-09

## 2023-02-09 VITALS
TEMPERATURE: 97.9 F | DIASTOLIC BLOOD PRESSURE: 68 MMHG | OXYGEN SATURATION: 98 % | BODY MASS INDEX: 30.26 KG/M2 | RESPIRATION RATE: 16 BRPM | HEIGHT: 63 IN | WEIGHT: 170.8 LBS | HEART RATE: 68 BPM | SYSTOLIC BLOOD PRESSURE: 130 MMHG

## 2023-02-09 DIAGNOSIS — B35.1 ONYCHOMYCOSIS OF TOENAIL: ICD-10-CM

## 2023-02-09 DIAGNOSIS — E03.8 HYPOTHYROIDISM DUE TO HASHIMOTO'S THYROIDITIS: ICD-10-CM

## 2023-02-09 DIAGNOSIS — E11.9 TYPE 2 DIABETES MELLITUS WITHOUT COMPLICATION, WITHOUT LONG-TERM CURRENT USE OF INSULIN (HCC): Primary | ICD-10-CM

## 2023-02-09 DIAGNOSIS — E78.5 DYSLIPIDEMIA: ICD-10-CM

## 2023-02-09 DIAGNOSIS — Z23 NEED FOR PNEUMOCOCCAL VACCINATION: ICD-10-CM

## 2023-02-09 DIAGNOSIS — E89.40 SURGICAL MENOPAUSE: ICD-10-CM

## 2023-02-09 DIAGNOSIS — N95.1 VASOMOTOR SYMPTOMS DUE TO MENOPAUSE: ICD-10-CM

## 2023-02-09 DIAGNOSIS — Z78.9 STATIN INTOLERANCE: ICD-10-CM

## 2023-02-09 DIAGNOSIS — I10 HYPERTENSION, ESSENTIAL: ICD-10-CM

## 2023-02-09 DIAGNOSIS — E06.3 HYPOTHYROIDISM DUE TO HASHIMOTO'S THYROIDITIS: ICD-10-CM

## 2023-02-09 LAB — HBA1C MFR BLD: 6.1 % (ref 4.3–5.7)

## 2023-02-09 RX ORDER — TERBINAFINE HYDROCHLORIDE 250 MG/1
250 TABLET ORAL DAILY
Qty: 84 TABLET | Refills: 0 | Status: SHIPPED | OUTPATIENT
Start: 2023-02-09 | End: 2023-05-04

## 2023-02-09 SDOH — ECONOMIC STABILITY: FOOD INSECURITY: WITHIN THE PAST 12 MONTHS, THE FOOD YOU BOUGHT JUST DIDN'T LAST AND YOU DIDN'T HAVE MONEY TO GET MORE.: NEVER TRUE

## 2023-02-09 SDOH — ECONOMIC STABILITY: FOOD INSECURITY: WITHIN THE PAST 12 MONTHS, YOU WORRIED THAT YOUR FOOD WOULD RUN OUT BEFORE YOU GOT MONEY TO BUY MORE.: NEVER TRUE

## 2023-02-09 SDOH — ECONOMIC STABILITY: INCOME INSECURITY: HOW HARD IS IT FOR YOU TO PAY FOR THE VERY BASICS LIKE FOOD, HOUSING, MEDICAL CARE, AND HEATING?: NOT HARD AT ALL

## 2023-02-09 SDOH — ECONOMIC STABILITY: HOUSING INSECURITY
IN THE LAST 12 MONTHS, WAS THERE A TIME WHEN YOU DID NOT HAVE A STEADY PLACE TO SLEEP OR SLEPT IN A SHELTER (INCLUDING NOW)?: NO

## 2023-02-09 ASSESSMENT — PATIENT HEALTH QUESTIONNAIRE - PHQ9
SUM OF ALL RESPONSES TO PHQ QUESTIONS 1-9: 0
SUM OF ALL RESPONSES TO PHQ QUESTIONS 1-9: 0
1. LITTLE INTEREST OR PLEASURE IN DOING THINGS: 0
SUM OF ALL RESPONSES TO PHQ QUESTIONS 1-9: 0
SUM OF ALL RESPONSES TO PHQ QUESTIONS 1-9: 0
2. FEELING DOWN, DEPRESSED OR HOPELESS: 0
SUM OF ALL RESPONSES TO PHQ9 QUESTIONS 1 & 2: 0

## 2023-02-09 NOTE — PATIENT INSTRUCTIONS
LAB INSTRUCTIONS:    Please complete labs in 3 months    Non-fasting ok. The clinic will call you within 1 week of collection. If you have not heard from us within that amount of time, please call us at [phone].

## 2023-03-21 ENCOUNTER — OFFICE VISIT (OUTPATIENT)
Dept: FAMILY MEDICINE CLINIC | Age: 63
End: 2023-03-21

## 2023-03-21 VITALS
HEART RATE: 87 BPM | OXYGEN SATURATION: 96 % | SYSTOLIC BLOOD PRESSURE: 132 MMHG | BODY MASS INDEX: 31.43 KG/M2 | HEIGHT: 63 IN | TEMPERATURE: 97.7 F | DIASTOLIC BLOOD PRESSURE: 74 MMHG | WEIGHT: 177.4 LBS | RESPIRATION RATE: 16 BRPM

## 2023-03-21 DIAGNOSIS — L25.9 CONTACT DERMATITIS AND ECZEMA: Primary | ICD-10-CM

## 2023-03-21 DIAGNOSIS — L29.9 ITCH: ICD-10-CM

## 2023-03-21 DIAGNOSIS — E11.9 TYPE 2 DIABETES MELLITUS WITHOUT COMPLICATION, WITHOUT LONG-TERM CURRENT USE OF INSULIN (HCC): Chronic | ICD-10-CM

## 2023-03-21 RX ORDER — METHYLPREDNISOLONE ACETATE 80 MG/ML
80 INJECTION, SUSPENSION INTRA-ARTICULAR; INTRALESIONAL; INTRAMUSCULAR; SOFT TISSUE ONCE
Status: COMPLETED | OUTPATIENT
Start: 2023-03-21 | End: 2023-03-21

## 2023-03-21 RX ORDER — CETIRIZINE HYDROCHLORIDE 10 MG/1
TABLET ORAL
COMMUNITY
Start: 2023-02-17

## 2023-03-21 RX ORDER — HYDROXYZINE HYDROCHLORIDE 25 MG/1
25 TABLET, FILM COATED ORAL EVERY 8 HOURS PRN
Qty: 30 TABLET | Refills: 0 | Status: SHIPPED | OUTPATIENT
Start: 2023-03-21 | End: 2023-03-31

## 2023-03-21 RX ADMIN — METHYLPREDNISOLONE ACETATE 80 MG: 80 INJECTION, SUSPENSION INTRA-ARTICULAR; INTRALESIONAL; INTRAMUSCULAR; SOFT TISSUE at 12:02

## 2023-03-21 ASSESSMENT — PATIENT HEALTH QUESTIONNAIRE - PHQ9
SUM OF ALL RESPONSES TO PHQ QUESTIONS 1-9: 0
SUM OF ALL RESPONSES TO PHQ9 QUESTIONS 1 & 2: 0
SUM OF ALL RESPONSES TO PHQ QUESTIONS 1-9: 0
SUM OF ALL RESPONSES TO PHQ QUESTIONS 1-9: 0
1. LITTLE INTEREST OR PLEASURE IN DOING THINGS: 0
2. FEELING DOWN, DEPRESSED OR HOPELESS: 0
DEPRESSION UNABLE TO ASSESS: FUNCTIONAL CAPACITY MOTIVATION LIMITS ACCURACY
SUM OF ALL RESPONSES TO PHQ QUESTIONS 1-9: 0

## 2023-05-04 DIAGNOSIS — B35.1 ONYCHOMYCOSIS OF TOENAIL: ICD-10-CM

## 2023-05-04 RX ORDER — TERBINAFINE HYDROCHLORIDE 250 MG/1
TABLET ORAL
Qty: 28 TABLET | Refills: 2 | OUTPATIENT
Start: 2023-05-04

## 2023-05-04 RX ORDER — TERBINAFINE HYDROCHLORIDE 250 MG/1
250 TABLET ORAL DAILY
Qty: 84 TABLET | Refills: 0 | OUTPATIENT
Start: 2023-05-04 | End: 2023-07-27

## 2023-05-08 ENCOUNTER — HOSPITAL ENCOUNTER (OUTPATIENT)
Dept: PHYSICAL THERAPY | Age: 63
Setting detail: THERAPIES SERIES
Discharge: HOME | End: 2023-05-08
Payer: MEDICAID

## 2023-05-08 PROCEDURE — 97162 PT EVAL MOD COMPLEX 30 MIN: CPT

## 2023-05-08 NOTE — PROGRESS NOTES
** PLEASE SIGN, DATE AND TIME CERTIFICATION BELOW AND RETURN TO [facility] OUTPATIENT REHABILITATION (FAX #: [phone]). ATTEST/CO-SIGN IF ACCESSING VIA IN"[employer]". THANK YOU.**    I certify that I have examined the patient below and determined that Physical Medicine and Rehabilitation service is necessary and that I approve the established plan of care for up to 90 days or as specifically noted. Attestation, signature or co-signature of physician indicates approval of certification requirements.    ________________________ ____________ __________  Physician Signature   Date   Time  [address] [facility]  PHYSICAL THERAPY  [x] EVALUATION  [] DAILY NOTE (LAND) [] DAILY NOTE (AQUATIC ) [] PROGRESS NOTE [] DISCHARGE NOTE    [x] [address] [facility]   [] [place] [address]    [] [facility]   [] [facility]    Date: 2023  Patient Name:  Nader Cramer  : 1960  MRN: [id number]  CSN: [id number]    Referring Practitioner Epi Davila MD   Diagnosis Trochanteric bursitis, right hip [M70.61]  Trochanteric bursitis, left hip [M70.62]  Pain in thoracic spine [M54.6]  Low back pain, unspecified [M54.50]  Other intervertebral disc degeneration, lumbar region [M51.36]    Treatment Diagnosis Difficulty with ambulation   Date of Evaluation 23    Additional Pertinent History High BP, Diabetes, Anxiety Disorder, Arthritis      Functional Outcome Measure Used Lower Extremity Functional Scale   Functional Outcome Score 46 (23)       Insurance: Primary: Payor: Reginald Miller /  /  / ,   Secondary:    Authorization Information: PRECERTIFICATION REQUIRED:  Auth needed after 30th visit. Call [phone] for auth. INSURANCE THERAPY BENEFIT:  Allowed 30 visits Physical Therapy /Occupational Therapy/Speech Therapy per calendar year. Auth needed after 30th visit. No visit limit for PT/OT/ST for patients age 8 and under.   FCE-Covered with no precert

## 2023-05-11 ENCOUNTER — HOSPITAL ENCOUNTER (OUTPATIENT)
Dept: PHYSICAL THERAPY | Age: 63
Setting detail: THERAPIES SERIES
Discharge: HOME | End: 2023-05-11
Payer: MEDICAID

## 2023-05-11 PROCEDURE — 97140 MANUAL THERAPY 1/> REGIONS: CPT

## 2023-05-11 PROCEDURE — 97110 THERAPEUTIC EXERCISES: CPT

## 2023-05-11 NOTE — PROGRESS NOTES
[address] [facility]  PHYSICAL THERAPY  [] EVALUATION  [x] DAILY NOTE (LAND) [] DAILY NOTE (AQUATIC ) [] PROGRESS NOTE [] DISCHARGE NOTE    [x] OUTPATIENT REHABILITATION [facility]   [] [place] [address]    [] [facility]   [] [facility]    Date: 2023  Patient Name:  Jeffy Knight  : 1960  MRN: [id number]  CSN: [id number]    Referring Practitioner Pearl Harris MD   Diagnosis Trochanteric bursitis, right hip [M70.61]  Trochanteric bursitis, left hip [M70.62]  Pain in thoracic spine [M54.6]  Low back pain, unspecified [M54.50]  Other intervertebral disc degeneration, lumbar region [M51.36]    Treatment Diagnosis Difficulty with ambulation   Date of Evaluation 23    Additional Pertinent History High BP, Diabetes, Anxiety Disorder, Arthritis      Functional Outcome Measure Used Lower Extremity Functional Scale   Functional Outcome Score 46 (23)       Insurance: Primary: Payor: Kuldeep Clemons /  /  / ,   Secondary:    Authorization Information: PRECERTIFICATION REQUIRED:  Auth needed after 30th visit. Call [phone] for auth. INSURANCE THERAPY BENEFIT:  Allowed 30 visits Physical Therapy /Occupational Therapy/Speech Therapy per calendar year. Auth needed after 30th visit. No visit limit for PT/OT/ST for patients age 8 and under. FCE-Covered with no precert required. Benefit will not cover maintenance or preventative treatment. AQUATIC THERAPY COVERED:   Yes  MODALITIES COVERED:  Yes. Iontophoresis and Hot/Cold Packs are not covered. Visit # 2, 2/10 for progress note   Visits Allowed: 30   Recertification Date:    Physician Follow-Up: Dr. Endy Troncoso-  Spine doctor-waiting on MRI   Physician Orders: Eval and treat   History of Present Illness: Patient has been having pain in hips for years. Has been to therapy last year for hips. Patient had cortisone injections in hips  which has helped her pain.

## 2023-05-15 ENCOUNTER — PATIENT MESSAGE (OUTPATIENT)
Dept: FAMILY MEDICINE CLINIC | Age: 63
End: 2023-05-15

## 2023-05-15 ENCOUNTER — HOSPITAL ENCOUNTER (OUTPATIENT)
Dept: PHYSICAL THERAPY | Age: 63
Setting detail: THERAPIES SERIES
Discharge: HOME | End: 2023-05-15
Payer: MEDICAID

## 2023-05-15 DIAGNOSIS — B35.1 ONYCHOMYCOSIS OF TOENAIL: Primary | ICD-10-CM

## 2023-05-15 PROCEDURE — 97140 MANUAL THERAPY 1/> REGIONS: CPT

## 2023-05-15 PROCEDURE — 97110 THERAPEUTIC EXERCISES: CPT

## 2023-05-15 NOTE — PROGRESS NOTES
[address] [facility]  PHYSICAL THERAPY  [] EVALUATION  [x] DAILY NOTE (LAND) [] DAILY NOTE (AQUATIC ) [] PROGRESS NOTE [] DISCHARGE NOTE    [x] OUTPATIENT REHABILITATION [facility]   [] [place] [address]    [] [facility]   [] Gabriel Benavides    Date: 5/15/2023  Patient Name:  Michael Damon  : 1960  MRN: [id number]  CSN: [id number]    Referring Practitioner Christine Portillo MD   Diagnosis Trochanteric bursitis, right hip [M70.61]  Trochanteric bursitis, left hip [M70.62]  Pain in thoracic spine [M54.6]  Low back pain, unspecified [M54.50]  Other intervertebral disc degeneration, lumbar region [M51.36]    Treatment Diagnosis Difficulty with ambulation   Date of Evaluation 23    Additional Pertinent History High BP, Diabetes, Anxiety Disorder, Arthritis      Functional Outcome Measure Used Lower Extremity Functional Scale   Functional Outcome Score 46 (23)       Insurance: Primary: Payor: eGtachew Gutierrez /  /  / ,   Secondary:    Authorization Information: PRECERTIFICATION REQUIRED:  Auth needed after 30th visit. Call [phone] for auth. INSURANCE THERAPY BENEFIT:  Allowed 30 visits Physical Therapy /Occupational Therapy/Speech Therapy per calendar year. Auth needed after 30th visit. No visit limit for PT/OT/ST for patients age 8 and under. FCE-Covered with no precert required. Benefit will not cover maintenance or preventative treatment. AQUATIC THERAPY COVERED:   Yes  MODALITIES COVERED:  Yes. Iontophoresis and Hot/Cold Packs are not covered. Visit # 3, 3/10 for progress note   Visits Allowed: 30   Recertification Date: 11   Physician Follow-Up: Dr. Akosua Santana. Karyna-  Spine doctor-waiting on MRI   Physician Orders: Eval and treat   History of Present Illness: Patient has been having pain in hips for years. Has been to therapy last year for hips. Patient had cortisone injections in hips  which has helped her pain.

## 2023-05-16 NOTE — TELEPHONE ENCOUNTER
From: Balwinder Rai  To: Dr. Patricio Razo: 5/15/2023 10:41 PM EDT  Subject: script denied for refill    Hello,  My script for lamisil was denied, but Dr SCOTT said it will take up to 6 mos to get rid of issue. Not sure what is going on. Would you please double check script for me?   Thank you,   Anthony Giron

## 2023-05-18 ENCOUNTER — HOSPITAL ENCOUNTER (OUTPATIENT)
Dept: PHYSICAL THERAPY | Age: 63
Setting detail: THERAPIES SERIES
Discharge: HOME | End: 2023-05-18
Payer: MEDICAID

## 2023-05-18 PROCEDURE — 97110 THERAPEUTIC EXERCISES: CPT

## 2023-05-18 NOTE — PROGRESS NOTES
[address] [facility]  PHYSICAL THERAPY  [] EVALUATION  [x] DAILY NOTE (LAND) [] DAILY NOTE (AQUATIC ) [] PROGRESS NOTE [] DISCHARGE NOTE    [x] OUTPATIENT REHABILITATION [facility]   [] [place] [address]    [] [facility]   [] [facility]     Date: 2023  Patient Name:  Sebastian Armendariz  : 1960  MRN: [id number]  CSN: [id number]    Referring Practitioner Wolfgang Garcia MD   Diagnosis Trochanteric bursitis, right hip [M70.61]  Trochanteric bursitis, left hip [M70.62]  Pain in thoracic spine [M54.6]  Low back pain, unspecified [M54.50]  Other intervertebral disc degeneration, lumbar region [M51.36]    Treatment Diagnosis Difficulty with ambulation   Date of Evaluation 23    Additional Pertinent History High BP, Diabetes, Anxiety Disorder, Arthritis      Functional Outcome Measure Used Lower Extremity Functional Scale   Functional Outcome Score 46 (23)       Insurance: Primary: Payor: Nelsy Liu /  /  / ,   Secondary:    Authorization Information: PRECERTIFICATION REQUIRED:  Auth needed after 30th visit. Call [phone] for auth. INSURANCE THERAPY BENEFIT:  Allowed 30 visits Physical Therapy /Occupational Therapy/Speech Therapy per calendar year. Auth needed after 30th visit. No visit limit for PT/OT/ST for patients age 8 and under. FCE-Covered with no precert required. Benefit will not cover maintenance or preventative treatment. AQUATIC THERAPY COVERED:   Yes  MODALITIES COVERED:  Yes. Iontophoresis and Hot/Cold Packs are not covered. Visit # 4, 4/10 for progress note   Visits Allowed: 30   Recertification Date:    Physician Follow-Up: Dr. Donald Troncoso-  Spine doctor-waiting on MRI   Physician Orders: Eval and treat   History of Present Illness: Patient has been having pain in hips for years. Has been to therapy last year for hips. Patient had cortisone injections in hips  which has helped her pain.

## 2023-05-22 ENCOUNTER — HOSPITAL ENCOUNTER (OUTPATIENT)
Dept: PHYSICAL THERAPY | Age: 63
Setting detail: THERAPIES SERIES
Discharge: HOME | End: 2023-05-22
Payer: MEDICAID

## 2023-05-22 PROCEDURE — 97110 THERAPEUTIC EXERCISES: CPT

## 2023-05-22 NOTE — PROGRESS NOTES
Is using a heating pad to help with pain. Pain in back has been worsening over the past few months. Has not had any testing completed on back. SUBJECTIVE: Patient states that she continues to have pain at her right buttocks region. She reports that her pain is going down her RLE more today. She states that her pain is a 2/10 with sitting and increases to a 6/10 when going up and down stairs. She states that she has been working on her exercises her and there at home. TREATMENT   Precautions: High BP, Diabetes, Anxiety Disorder, Arthritis   Pain: 2/10 Right buttock down RLE stationary, increases to 6/10 when performing stairs    \"X in shaded column indicates activity completed today    *\" next to exercise/intervention indicates progression   Modalities Parameters/  Location  Notes   MHP LB X Concurrent with supine exercises, No adverse skin reactions noted pre or post treatment               Manual Therapy Time/Technique  Notes   Hawk  to bilateral IT band and lateral quad and Right buttock 12 minutes each side (24 total) X Patient in sidelying position with pillow between knees.  Only performed at right side today, completed for 5 minutes without use of hawk , only performed at buttocks region               Exercise/  Intervention   Notes   Mat exercises:       LTR  5x10 sec  X    SKTC x3 20 sec X    Supine IT band stretch with blue strap 3x15 sec  X Blue strap vended- 2x on R then needing to stop due to pain    90/90 Hamstring stretch with ankle pumps (Nerve glide) 3x10 pumps  X    Hamstring stretch with strap at foot  3x20 sec  X No strap used today   Abdominal bracing*  10x5s  X    Ab brace +marches X10  X Cues for eccentric control   AB brace + BKFO bilateral/unilateral X10x3s*  X    Longitudinal hip rotation*  10x2s  X    Ab brace + iso hip adduction (ball squeeze)*  10x5s  X    Bridges with ball between knees* 10x5s  X           Seated:        Abdominal bracing  12x5 sec  X 10 reps

## 2023-05-25 ENCOUNTER — HOSPITAL ENCOUNTER (OUTPATIENT)
Dept: PHYSICAL THERAPY | Age: 63
Setting detail: THERAPIES SERIES
Discharge: HOME | End: 2023-05-25
Payer: MEDICAID

## 2023-05-25 PROCEDURE — 97110 THERAPEUTIC EXERCISES: CPT

## 2023-05-25 NOTE — PROGRESS NOTES
[address] [facility]  PHYSICAL THERAPY  [] EVALUATION  [x] DAILY NOTE (LAND) [] DAILY NOTE (AQUATIC ) [] PROGRESS NOTE [] DISCHARGE NOTE    [x] OUTPATIENT REHABILITATION CENTER [facility]   [] [place] [address]    [] [facility]   [] Sharita Orozco    Date: 2023  Patient Name:  Shantal Velarde  : 1960  MRN: [id number]  CSN: [id number]    Referring Practitioner Ebony Jensen MD   Diagnosis Trochanteric bursitis, right hip [M70.61]  Trochanteric bursitis, left hip [M70.62]  Pain in thoracic spine [M54.6]  Low back pain, unspecified [M54.50]  Other intervertebral disc degeneration, lumbar region [M51.36]    Treatment Diagnosis Difficulty with ambulation   Date of Evaluation 23    Additional Pertinent History High BP, Diabetes, Anxiety Disorder, Arthritis      Functional Outcome Measure Used Lower Extremity Functional Scale   Functional Outcome Score 46 (23)       Insurance: Primary: Payor: [employer] /  /  / ,   Secondary:    Authorization Information: PRECERTIFICATION REQUIRED:  Auth needed after 30th visit. Call [phone] for auth. INSURANCE THERAPY BENEFIT:  Allowed 30 visits Physical Therapy /Occupational Therapy/Speech Therapy per calendar year. Auth needed after 30th visit. No visit limit for PT/OT/ST for patients age 8 and under. FCE-Covered with no precert required. Benefit will not cover maintenance or preventative treatment. AQUATIC THERAPY COVERED:   Yes  MODALITIES COVERED:  Yes. Iontophoresis and Hot/Cold Packs are not covered. Visit # 6, 6/10 for progress note   Visits Allowed:    Recertification Date:    Physician Follow-Up: Dr. Luis Troncoso-  Spine doctor-waiting on MRI   Physician Orders: Eval and treat   History of Present Illness: Patient has been having pain in hips for years. Has been to therapy last year for hips. Patient had cortisone injections in hips  which has helped her pain.

## 2023-05-26 LAB
ABSOLUTE BASO #: 0.01 K/UL (ref 0–0.2)
ABSOLUTE EOS #: 0.01 K/UL (ref 0–0.5)
ABSOLUTE LYMPH #: 1.51 K/UL (ref 1–4)
ABSOLUTE MONO #: 0.51 K/UL (ref 0.2–1)
ABSOLUTE NEUT #: 5.14 K/UL (ref 1.5–7.5)
ALBUMIN SERPL-MCNC: 4.8 G/DL (ref 3.5–5.2)
ALK PHOSPHATASE: 51 U/L (ref 40–140)
ALT SERPL-CCNC: 17 U/L (ref 5–40)
AST SERPL-CCNC: 17 U/L (ref 9–40)
BASOPHILS RELATIVE PERCENT: 0.1 %
BILIRUB SERPL-MCNC: 0.3 MG/DL
BILIRUBIN DIRECT: <0.2 MG/DL (ref 0–0.3)
EOSINOPHILS RELATIVE PERCENT: 0.1 %
HCT VFR BLD CALC: 39.5 % (ref 34–45)
HEMOGLOBIN: 13.5 G/DL (ref 11.5–15.5)
LYMPHOCYTE %: 21 %
MCH RBC QN AUTO: 28.4 PG (ref 25–33)
MCHC RBC AUTO-ENTMCNC: 34.2 G/DL (ref 31–36)
MCV RBC AUTO: 83 FL (ref 80–99)
MONOCYTES # BLD: 7.1 %
NEUTROPHILS RELATIVE PERCENT: 71.6 %
PDW BLD-RTO: 12.7 % (ref 11.5–15)
PLATELETS: 317 K/UL (ref 130–400)
PMV BLD AUTO: 10.5 FL (ref 9.3–13)
RBC: 4.76 M/UL (ref 3.8–5.4)
TOTAL PROTEIN: 6.9 G/DL (ref 6.1–8.3)
TSH SERPL DL<=0.05 MIU/L-ACNC: 2.47 UIU/ML (ref 0.4–4.1)
WBC: 7.2 K/UL (ref 3.5–11)

## 2023-05-29 DIAGNOSIS — B35.1 ONYCHOMYCOSIS OF TOENAIL: ICD-10-CM

## 2023-05-29 RX ORDER — TERBINAFINE HYDROCHLORIDE 250 MG/1
250 TABLET ORAL DAILY
Qty: 84 TABLET | Refills: 0 | Status: SHIPPED | OUTPATIENT
Start: 2023-05-29 | End: 2023-08-21

## 2023-05-30 ENCOUNTER — HOSPITAL ENCOUNTER (OUTPATIENT)
Dept: PHYSICAL THERAPY | Age: 63
Setting detail: THERAPIES SERIES
Discharge: HOME | End: 2023-05-30
Payer: MEDICAID

## 2023-05-30 PROCEDURE — 97110 THERAPEUTIC EXERCISES: CPT

## 2023-05-30 NOTE — PROGRESS NOTES
[facility] [facility]  PHYSICAL THERAPY  [] EVALUATION  [x] DAILY NOTE (LAND) [] DAILY NOTE (AQUATIC ) [] PROGRESS NOTE [] DISCHARGE NOTE    [x] [facility] [place]   [] [place] [address]    [] [facility]   [] Kt Maloney    Date: 2023  Patient Name:  Archie Carballo  : 1960  MRN: [id number]  CSN: [id number]    Referring Practitioner Katy Wiseman MD   Diagnosis Trochanteric bursitis, right hip [M70.61]  Trochanteric bursitis, left hip [M70.62]  Pain in thoracic spine [M54.6]  Low back pain, unspecified [M54.50]  Other intervertebral disc degeneration, lumbar region [M51.36]    Treatment Diagnosis Difficulty with ambulation   Date of Evaluation 23    Additional Pertinent History High BP, Diabetes, Anxiety Disorder, Arthritis      Functional Outcome Measure Used Lower Extremity Functional Scale   Functional Outcome Score 46 (23)       Insurance: Primary: Payor: Rei Robles /  /  / ,   Secondary:    Authorization Information: PRECERTIFICATION REQUIRED:  Auth needed after 30th visit. Call [phone] for auth. INSURANCE THERAPY BENEFIT:  Allowed 30 visits Physical Therapy /Occupational Therapy/Speech Therapy per calendar year. Auth needed after 30th visit. No visit limit for PT/OT/ST for patients age 8 and under. FCE-Covered with no precert required. Benefit will not cover maintenance or preventative treatment. AQUATIC THERAPY COVERED:   Yes  MODALITIES COVERED:  Yes. Iontophoresis and Hot/Cold Packs are not covered. Visit # 7, 7/10 for progress note   Visits Allowed: 30   Recertification Date: 3/11/70   Physician Follow-Up: Dr. Monae Troncoso-  Spine doctor-waiting on MRI   Physician Orders: Eval and treat   History of Present Illness: Patient has been having pain in hips for years. Has been to therapy last year for hips. Patient had cortisone injections in hips  which has helped her pain.

## 2023-06-01 ENCOUNTER — TELEPHONE (OUTPATIENT)
Dept: FAMILY MEDICINE CLINIC | Age: 63
End: 2023-06-01

## 2023-06-01 ENCOUNTER — HOSPITAL ENCOUNTER (OUTPATIENT)
Dept: PHYSICAL THERAPY | Age: 63
Setting detail: THERAPIES SERIES
Discharge: HOME | End: 2023-06-01
Payer: MEDICAID

## 2023-06-01 PROCEDURE — 97110 THERAPEUTIC EXERCISES: CPT

## 2023-06-01 NOTE — TELEPHONE ENCOUNTER
----- Message from Deonte Waters DO sent at 5/29/2023  6:40 AM EDT -----  Please let pt know that CBC, TSH and LFT are WNL. [address] for another 3 months of Lamisil for toenail fungus. Rx sent. Con't synthroid at inc dose of 50 mcg. Let me know if questions, thanks!

## 2023-06-01 NOTE — PROGRESS NOTES
[address] [facility]  PHYSICAL THERAPY  [] EVALUATION  [x] DAILY NOTE (LAND) [] DAILY NOTE (AQUATIC ) [] PROGRESS NOTE [] DISCHARGE NOTE    [x] OUTPATIENT REHABILITATION [facility]   [] [place] [address]    [] [facility]   [] Edwin Moreno    Date: 2023  Patient Name:  Sasha Kumar  : 1960  MRN: [id number]  CSN: [id number]    Referring Practitioner Stiven Dean MD   Diagnosis Trochanteric bursitis, right hip [M70.61]  Trochanteric bursitis, left hip [M70.62]  Pain in thoracic spine [M54.6]  Low back pain, unspecified [M54.50]  Other intervertebral disc degeneration, lumbar region [M51.36]    Treatment Diagnosis Difficulty with ambulation   Date of Evaluation 23    Additional Pertinent History High BP, Diabetes, Anxiety Disorder, Arthritis      Functional Outcome Measure Used Lower Extremity Functional Scale   Functional Outcome Score 46 (23)       Insurance: Primary: Payor: Elvia Hayward /  /  / ,   Secondary:    Authorization Information: PRECERTIFICATION REQUIRED:  Auth needed after 30th visit. Call [phone] for auth. INSURANCE THERAPY BENEFIT:  Allowed 30 visits Physical Therapy /Occupational Therapy/Speech Therapy per calendar year. Auth needed after 30th visit. No visit limit for PT/OT/ST for patients age 8 and under. FCE-Covered with no precert required. Benefit will not cover maintenance or preventative treatment. AQUATIC THERAPY COVERED:   Yes  MODALITIES COVERED:  Yes. Iontophoresis and Hot/Cold Packs are not covered. Visit # 8, 8/10 for progress note   Visits Allowed: 30   Recertification Date: 47   Physician Follow-Up: Dr. Mary Ann Desir. Karyna-  Spine doctor-waiting on MRI   Physician Orders: Eval and treat   History of Present Illness: Patient has been having pain in hips for years. Has been to therapy last year for hips. Patient had cortisone injections in hips  which has helped her pain.

## 2023-06-05 ENCOUNTER — HOSPITAL ENCOUNTER (OUTPATIENT)
Dept: PHYSICAL THERAPY | Age: 63
Setting detail: THERAPIES SERIES
Discharge: HOME | End: 2023-06-05
Payer: MEDICAID

## 2023-06-05 PROCEDURE — 97110 THERAPEUTIC EXERCISES: CPT

## 2023-06-05 NOTE — PROGRESS NOTES
reviewed HEP  [facility] Access Code: [email or web address]  []  No new Education completed  []  Reviewed Prior HEP      [x]  Patient verbalized and/or demonstrated understanding of education provided. []  Patient unable to verbalize and/or demonstrate understanding of education provided. Will continue education. [x]  Barriers to learning: none per patient    PLAN:  Treatment Recommendations: Strengthening, Range of Motion, Functional Mobility Training, Transfer Training, Endurance Training, Manual Therapy - Soft Tissue Mobilization, Pain Management, Home Exercise Program, Patient Education, Safety Education and Training, Self-Care Education and Training, Positioning, Integrative Dry Needling, Aquatics, and Modalities    []  Plan of care initiated. Plan to see patient 2 times per week for up to 12 weeks to address the treatment planned outlined above. []  Continue with current plan of care  [x]  Modify plan of care as follows:  Currently on hold until after MRI. Patient to contact therapist after MRI if additional therapy is needed.   Patient to be discharged in 2 weeks if does not contact therapist.  If additional therapy is needed may schedule patient 2 times per week for up to 8 weeks  []  Hold pending physician visit  []  Discharge    Time In 1144   Time Out 1222   Timed Code Minutes: 38   Total Treatment Time: 38       Electronically Signed by: Isabelle Tovar PT

## 2023-06-26 ENCOUNTER — OFFICE VISIT (OUTPATIENT)
Dept: FAMILY MEDICINE CLINIC | Age: 63
End: 2023-06-26
Payer: MEDICAID

## 2023-06-26 VITALS
WEIGHT: 166.8 LBS | DIASTOLIC BLOOD PRESSURE: 78 MMHG | OXYGEN SATURATION: 99 % | SYSTOLIC BLOOD PRESSURE: 130 MMHG | RESPIRATION RATE: 16 BRPM | HEART RATE: 68 BPM | HEIGHT: 63 IN | TEMPERATURE: 98.2 F | BODY MASS INDEX: 29.55 KG/M2

## 2023-06-26 DIAGNOSIS — L30.9 DERMATITIS: Primary | ICD-10-CM

## 2023-06-26 PROCEDURE — 3078F DIAST BP <80 MM HG: CPT | Performed by: FAMILY MEDICINE

## 2023-06-26 PROCEDURE — 3075F SYST BP GE 130 - 139MM HG: CPT | Performed by: FAMILY MEDICINE

## 2023-06-26 PROCEDURE — 99213 OFFICE O/P EST LOW 20 MIN: CPT | Performed by: FAMILY MEDICINE

## 2023-06-26 RX ORDER — FAMOTIDINE 40 MG/1
TABLET, FILM COATED ORAL
COMMUNITY
Start: 2023-05-31

## 2023-06-26 RX ORDER — METHYLPREDNISOLONE ACETATE 80 MG/ML
80 INJECTION, SUSPENSION INTRA-ARTICULAR; INTRALESIONAL; INTRAMUSCULAR; SOFT TISSUE ONCE
Status: COMPLETED | OUTPATIENT
Start: 2023-06-26 | End: 2023-06-26

## 2023-06-26 RX ORDER — PREDNISONE 20 MG/1
40 TABLET ORAL DAILY
Qty: 10 TABLET | Refills: 0 | Status: SHIPPED | OUTPATIENT
Start: 2023-06-27 | End: 2023-07-02

## 2023-06-26 RX ADMIN — METHYLPREDNISOLONE ACETATE 80 MG: 80 INJECTION, SUSPENSION INTRA-ARTICULAR; INTRALESIONAL; INTRAMUSCULAR; SOFT TISSUE at 11:17

## 2023-07-19 ENCOUNTER — TELEPHONE (OUTPATIENT)
Dept: FAMILY MEDICINE CLINIC | Age: 63
End: 2023-07-19

## 2023-07-19 DIAGNOSIS — E11.9 TYPE 2 DIABETES MELLITUS WITHOUT COMPLICATION, WITHOUT LONG-TERM CURRENT USE OF INSULIN (HCC): Primary | Chronic | ICD-10-CM

## 2023-07-20 NOTE — TELEPHONE ENCOUNTER
Pt due for fasting labs prior to next apt on 8/14/2023. Please call to have pt complete this. Thanks! ASSESSMENT & PLAN   Diagnosis Orders   1.  Type 2 diabetes mellitus without complication, without long-term current use of insulin ([address])  Lipid Panel    Microalbumin / Creatinine Urine Ratio    Basic Metabolic Panel        Future Appointments   Date Time Provider [address] [place] [address]   8/14/2023 10:20 AM [facility]   10/19/2023  1:15 PM Louis Roy MD N [facility] [place]

## 2023-09-01 LAB
ANION GAP SERPL CALCULATED.3IONS-SCNC: 11 MEQ/L (ref 7–16)
BUN BLDV-MCNC: 14 MG/DL (ref 8–23)
CALCIUM SERPL-MCNC: 9.9 MG/DL (ref 8.5–10.5)
CHLORIDE BLD-SCNC: 102 MEQ/L (ref 95–107)
CHOLESTEROL/HDL RATIO: 3 RATIO
CHOLESTEROL: 234 MG/DL
CO2: 28 MEQ/L (ref 19–31)
CREAT SERPL-MCNC: 0.79 MG/DL (ref 0.6–1.3)
CREATINE, URINE: 107.3 MG/DL
EGFR IF NONAFRICAN AMERICAN: 83 ML/MIN/1.73
GLUCOSE: 101 MG/DL (ref 70–99)
HDLC SERPL-MCNC: 77 MG/DL
LDL CHOLESTEROL CALCULATED: 124 MG/DL
LDL/HDL RATIO: 1.6 RATIO
MICROALBUMIN/CREAT 24H UR: <1.2 MG/DL
MICROALBUMIN/CREAT UR-RTO: NORMAL MG/G
POTASSIUM SERPL-SCNC: 5.4 MEQ/L (ref 3.5–5.4)
SODIUM BLD-SCNC: 141 MEQ/L (ref 133–146)
TRIGL SERPL-MCNC: 167 MG/DL
VLDLC SERPL CALC-MCNC: 33 MG/DL

## 2023-09-05 ENCOUNTER — TELEPHONE (OUTPATIENT)
Dept: FAMILY MEDICINE CLINIC | Age: 63
End: 2023-09-05

## 2023-09-05 DIAGNOSIS — E78.5 DYSLIPIDEMIA: Primary | ICD-10-CM

## 2023-09-05 NOTE — TELEPHONE ENCOUNTER
Parag Floress  That shouldn't have been a huge issue with the labs  Let's just repeat labs in 4 wks, fasting. May have been a transient phenomenon. Let me know if questions, thanks! Diagnosis Orders   1.  Dyslipidemia  Lipid Panel        Future Appointments   Date Time Provider [address] [place] [address]   9/7/2023  9:40 AM Enrrique Go DO [facility] [address] [facility] [address]   10/19/2023  1:15 [facility] Reji Wright MD N [facility]

## 2023-09-05 NOTE — TELEPHONE ENCOUNTER
----- Message from Shola Burmfield, DO sent at 9/3/2023  6:30 AM EDT -----  Please let pt know that labs look good other than Cholesterol higher than prior. Is she still taking the Zetia and Praluent? Let me know, thanks!

## 2023-09-05 NOTE — TELEPHONE ENCOUNTER
Patient states she didn't take the medication before lab draw due to she has stomach issues and she had to fast for that. Patient states she does take it everyday. Patient informed and verbalized understanding.

## 2023-09-06 PROBLEM — I87.2 VENOUS STASIS DERMATITIS OF BOTH LOWER EXTREMITIES: Status: RESOLVED | Noted: 2021-08-05 | Resolved: 2023-09-06

## 2023-09-06 PROBLEM — L72.0 EPIDERMAL CYST OF FACE: Status: RESOLVED | Noted: 2021-08-05 | Resolved: 2023-09-06

## 2023-09-06 PROBLEM — M26.623 BILATERAL TEMPOROMANDIBULAR JOINT PAIN: Status: RESOLVED | Noted: 2021-08-05 | Resolved: 2023-09-06

## 2023-09-06 NOTE — PROGRESS NOTES
Vasomotor symptoms due to menopause    Stable  con't estrogen  Unable to wean past 0.3mg previously    7. Hypothyroidism due to Hashimoto's thyroiditis    stable  Con't synthroid    8. Onychomycosis of toenail    Better than prior  Completed 24 wks Lamisil  Will see how it looks in 6 months, as just toe distal R great toe nail is affected at this point      DISPOSITION    Return in about 6 months (around 3/7/2024) for Follow-up Diabetes, follow-up on chronic medical conditions, sooner as needed. Mchugh released without restrictions. Future Appointments   Date Time Provider [address] [place] [address]   10/19/2023  1:15 PM Tha Graham MD N [facility]   3/7/2024 11:00 AM Roberto Courtney DO [place] [id number] [facility] [address]     PATIENT COUNSELING    Barriers to learning and self management: none    Discussed use, benefit, and side effects of prescribed medications. Barriers to medication compliance addressed. All patient questions answered. Pt voiced understanding.        Electronically signed by Roberto Courtney DO on 9/7/2023 at 10:07 AM

## 2023-09-07 ENCOUNTER — OFFICE VISIT (OUTPATIENT)
Dept: FAMILY MEDICINE CLINIC | Age: 63
End: 2023-09-07
Payer: COMMERCIAL

## 2023-09-07 VITALS
HEART RATE: 68 BPM | TEMPERATURE: 97 F | WEIGHT: 170 LBS | DIASTOLIC BLOOD PRESSURE: 62 MMHG | RESPIRATION RATE: 16 BRPM | SYSTOLIC BLOOD PRESSURE: 120 MMHG | HEIGHT: 63 IN | BODY MASS INDEX: 30.12 KG/M2 | OXYGEN SATURATION: 98 %

## 2023-09-07 DIAGNOSIS — E03.8 HYPOTHYROIDISM DUE TO HASHIMOTO'S THYROIDITIS: ICD-10-CM

## 2023-09-07 DIAGNOSIS — E06.3 HYPOTHYROIDISM DUE TO HASHIMOTO'S THYROIDITIS: ICD-10-CM

## 2023-09-07 DIAGNOSIS — E78.5 DYSLIPIDEMIA: ICD-10-CM

## 2023-09-07 DIAGNOSIS — B35.1 ONYCHOMYCOSIS OF TOENAIL: ICD-10-CM

## 2023-09-07 DIAGNOSIS — E11.9 TYPE 2 DIABETES MELLITUS WITHOUT COMPLICATION, WITHOUT LONG-TERM CURRENT USE OF INSULIN (HCC): Primary | ICD-10-CM

## 2023-09-07 DIAGNOSIS — Z78.9 STATIN INTOLERANCE: ICD-10-CM

## 2023-09-07 DIAGNOSIS — N95.1 VASOMOTOR SYMPTOMS DUE TO MENOPAUSE: ICD-10-CM

## 2023-09-07 DIAGNOSIS — E89.40 SURGICAL MENOPAUSE: ICD-10-CM

## 2023-09-07 DIAGNOSIS — I10 HYPERTENSION, ESSENTIAL: Chronic | ICD-10-CM

## 2023-09-07 LAB — HBA1C MFR BLD: 6.5 % (ref 4.3–5.7)

## 2023-09-07 PROCEDURE — 3074F SYST BP LT 130 MM HG: CPT | Performed by: FAMILY MEDICINE

## 2023-09-07 PROCEDURE — G8427 DOCREV CUR MEDS BY ELIG CLIN: HCPCS | Performed by: FAMILY MEDICINE

## 2023-09-07 PROCEDURE — G8417 CALC BMI ABV UP PARAM F/U: HCPCS | Performed by: FAMILY MEDICINE

## 2023-09-07 PROCEDURE — 2022F DILAT RTA XM EVC RTNOPTHY: CPT | Performed by: FAMILY MEDICINE

## 2023-09-07 PROCEDURE — 99214 OFFICE O/P EST MOD 30 MIN: CPT | Performed by: FAMILY MEDICINE

## 2023-09-07 PROCEDURE — 3078F DIAST BP <80 MM HG: CPT | Performed by: FAMILY MEDICINE

## 2023-09-07 PROCEDURE — 1036F TOBACCO NON-USER: CPT | Performed by: FAMILY MEDICINE

## 2023-09-07 PROCEDURE — 3044F HG A1C LEVEL LT 7.0%: CPT | Performed by: FAMILY MEDICINE

## 2023-09-07 PROCEDURE — 3017F COLORECTAL CA SCREEN DOC REV: CPT | Performed by: FAMILY MEDICINE

## 2023-09-11 ENCOUNTER — TELEPHONE (OUTPATIENT)
Dept: FAMILY MEDICINE CLINIC | Age: 63
End: 2023-09-11

## 2023-09-11 NOTE — TELEPHONE ENCOUNTER
----- Message from Ravindra Byrd DO sent at 9/8/2023  4:55 PM EDT -----  Please let pt know that mammogram is normal. Let me know if questions, thanks!

## 2023-10-18 ENCOUNTER — TELEPHONE (OUTPATIENT)
Dept: FAMILY MEDICINE CLINIC | Age: 63
End: 2023-10-18

## 2023-10-18 LAB
CHOLESTEROL/HDL RATIO: 2.5 RATIO
CHOLESTEROL: 186 MG/DL
HDLC SERPL-MCNC: 74 MG/DL
LDL CHOLESTEROL CALCULATED: 81 MG/DL
LDL/HDL RATIO: 1.1 RATIO
TRIGL SERPL-MCNC: 156 MG/DL
VLDLC SERPL CALC-MCNC: 31 MG/DL

## 2023-10-18 NOTE — TELEPHONE ENCOUNTER
----- Message from Alesha Flanagan DO sent at 10/18/2023  4:43 AM EDT -----  Please let pt know that lipids look good  Con't Zetia/Praluent  Let me know if questions, thanks!

## 2023-10-19 ENCOUNTER — OFFICE VISIT (OUTPATIENT)
Dept: CARDIOLOGY CLINIC | Age: 63
End: 2023-10-19
Payer: COMMERCIAL

## 2023-10-19 VITALS
HEART RATE: 99 BPM | WEIGHT: 171.2 LBS | DIASTOLIC BLOOD PRESSURE: 71 MMHG | BODY MASS INDEX: 30.33 KG/M2 | SYSTOLIC BLOOD PRESSURE: 122 MMHG | HEIGHT: 63 IN

## 2023-10-19 DIAGNOSIS — I10 HYPERTENSION, ESSENTIAL: ICD-10-CM

## 2023-10-19 DIAGNOSIS — E78.5 HYPERLIPIDEMIA, UNSPECIFIED HYPERLIPIDEMIA TYPE: Primary | ICD-10-CM

## 2023-10-19 PROCEDURE — 1036F TOBACCO NON-USER: CPT | Performed by: INTERNAL MEDICINE

## 2023-10-19 PROCEDURE — G8417 CALC BMI ABV UP PARAM F/U: HCPCS | Performed by: INTERNAL MEDICINE

## 2023-10-19 PROCEDURE — G8484 FLU IMMUNIZE NO ADMIN: HCPCS | Performed by: INTERNAL MEDICINE

## 2023-10-19 PROCEDURE — G8427 DOCREV CUR MEDS BY ELIG CLIN: HCPCS | Performed by: INTERNAL MEDICINE

## 2023-10-19 PROCEDURE — 3078F DIAST BP <80 MM HG: CPT | Performed by: INTERNAL MEDICINE

## 2023-10-19 PROCEDURE — 3074F SYST BP LT 130 MM HG: CPT | Performed by: INTERNAL MEDICINE

## 2023-10-19 PROCEDURE — 93000 ELECTROCARDIOGRAM COMPLETE: CPT | Performed by: INTERNAL MEDICINE

## 2023-10-19 PROCEDURE — 99213 OFFICE O/P EST LOW 20 MIN: CPT | Performed by: INTERNAL MEDICINE

## 2023-10-19 PROCEDURE — 3017F COLORECTAL CA SCREEN DOC REV: CPT | Performed by: INTERNAL MEDICINE

## 2023-10-19 NOTE — PROGRESS NOTES
The patient presents for a 1-year follow-up. EKG completed today.
 07/28/2022 12:00 AM    MPV 10.5 05/26/2023 02:46 PM       Lab Results   Component Value Date/Time     09/01/2023 09:38 AM    K 5.4 09/01/2023 09:38 AM     09/01/2023 09:38 AM    CO2 28 09/01/2023 09:38 AM    BUN 14 09/01/2023 09:38 AM    LABALBU 4.8 05/26/2023 02:46 PM    CREATININE 0.79 09/01/2023 09:38 AM    CALCIUM 9.9 09/01/2023 09:38 AM    LABGLOM 80 07/28/2022 12:00 AM    GLUCOSE 101 09/01/2023 09:38 AM       Lab Results   Component Value Date/Time    ALKPHOS 51 05/26/2023 02:46 PM    ALKPHOS 61 07/28/2022 12:00 AM    ALT 17 05/26/2023 02:46 PM    AST 17 05/26/2023 02:46 PM    PROT 6.9 05/26/2023 02:46 PM    BILITOT 0.3 05/26/2023 02:46 PM    BILIDIR <0.2 05/26/2023 02:46 PM    LABALBU 4.8 05/26/2023 02:46 PM       No results found for: \"MG\"    No results found for: \"INR\", \"PROTIME\"      Lab Results   Component Value Date/Time    LABA1C 6.5 09/07/2023 08:48 AM    LABA1C 7.1 05/13/2021 12:00 AM       Lab Results   Component Value Date/Time    TRIG 156 10/17/2023 11:26 AM    HDL 74 10/17/2023 11:26 AM    LDLCALC 81 10/17/2023 11:26 AM    LABVLDL 31 10/17/2023 11:26 AM       Lab Results   Component Value Date/Time    TSH 2.47 05/26/2023 02:46 PM         Testing Reviewed:      I have individually reviewed the cardiac test below:    ECHO: No results found for this or any previous visit. Assessment/Plan   Familial HLD - she is on GDMT, taking adequate meds. Continue to follow. Discussed symptoms needing emergency care or those which require further medical attention. Discussed diet/exercise/BP/weight loss/health lifestyle choices/lipids; the patient understands the goals and will try to comply.     Disposition:  prn           Electronically signed by Yasmany Espinoza MD   10/19/2023 at 1:56 PM EDT

## 2023-12-07 DIAGNOSIS — E89.40 SURGICAL MENOPAUSE: ICD-10-CM

## 2023-12-07 DIAGNOSIS — N95.1 VASOMOTOR SYMPTOMS DUE TO MENOPAUSE: ICD-10-CM

## 2023-12-07 RX ORDER — CONJUGATED ESTROGENS 0.3 MG/1
TABLET, FILM COATED ORAL
Qty: 90 TABLET | Refills: 3 | Status: SHIPPED | OUTPATIENT
Start: 2023-12-07

## 2023-12-07 NOTE — TELEPHONE ENCOUNTER
Recent Visits  Date Type Provider Dept   09/07/23 Office Visit Rowan Donaldson, DO [facility]   06/26/23 Office Visit Rowan Donaldson, DO [facility]   03/21/23 Office Visit NINA Wong CNP [facility]   02/09/23 Office Visit Rowan Donaldson, DO [facility]   11/21/22 Office Visit Rowan Donaldson, DO [facility]   10/11/22 Office Visit Rowan Donaldson, DO [facility]   08/08/22 Office Visit Rowan Donaldson, DO [facility]   Showing recent visits within past 540 days with a meds authorizing provider and meeting all other requirements  Future Appointments  Date Type Provider Dept   03/07/24 Appointment Rowan Donaldson,  [facility]   Showing future appointments within next 150 days with a meds authorizing provider and meeting all other requirements

## 2023-12-08 ENCOUNTER — TELEPHONE (OUTPATIENT)
Dept: FAMILY MEDICINE CLINIC | Age: 63
End: 2023-12-08

## 2023-12-08 DIAGNOSIS — J01.90 ACUTE RHINOSINUSITIS: Primary | ICD-10-CM

## 2023-12-08 DIAGNOSIS — I10 HYPERTENSION, ESSENTIAL: Primary | ICD-10-CM

## 2023-12-08 RX ORDER — FLUTICASONE PROPIONATE 50 MCG
1 SPRAY, SUSPENSION (ML) NASAL DAILY
Qty: 16 G | Refills: 0 | Status: SHIPPED | OUTPATIENT
Start: 2023-12-08 | End: 2023-12-22

## 2023-12-08 RX ORDER — BENZONATATE 100 MG/1
CAPSULE ORAL
Qty: 60 CAPSULE | Refills: 0 | Status: SHIPPED | OUTPATIENT
Start: 2023-12-08 | End: 2023-12-18

## 2023-12-08 RX ORDER — LOSARTAN POTASSIUM AND HYDROCHLOROTHIAZIDE 12.5; 1 MG/1; MG/1
1 TABLET ORAL DAILY
Qty: 90 TABLET | Refills: 3 | Status: SHIPPED | OUTPATIENT
Start: 2023-12-08

## 2023-12-08 RX ORDER — CEFDINIR 300 MG/1
300 CAPSULE ORAL 2 TIMES DAILY
Qty: 20 CAPSULE | Refills: 0 | Status: SHIPPED | OUTPATIENT
Start: 2023-12-08 | End: 2023-12-18

## 2023-12-08 NOTE — TELEPHONE ENCOUNTER
Cough  Congestion   Sore throat  Going on a wk  Can't wait until apt Monday    No fever  No SOB     Diagnosis Orders   1.  Acute rhinosinusitis  cefdinir (OMNICEF) 300 MG capsule    benzonatate (TESSALON PERLES) 100 MG capsule    fluticasone (FLONASE) 50 MCG/ACT nasal spray          Future Appointments   Date Time Provider [address]   12/11/2023  9:00 AM [facility]   3/7/2024 11:00 AM Bhavesh Mello [address]

## 2023-12-08 NOTE — TELEPHONE ENCOUNTER
Recent Visits  Date Type Provider Dept   09/07/23 Office Visit Kareem Benavidez, DO [facility]   06/26/23 Office Visit Kareem Benavidez, DO [facility]   03/21/23 Office Visit NINA Hung CNP [facility]   02/09/23 Office Visit Kareem Benaviedz, DO [facility]   11/21/22 Office Visit Kareem Benavidez, DO [facility]   10/11/22 Office Visit Kareem Benavidez, DO [facility]   08/08/22 Office Visit Kareem Benavidez, DO [facility]   Showing recent visits within past 540 days with a meds authorizing provider and meeting all other requirements  Future Appointments  Date Type Provider Dept   12/11/23 Appointment Kareem Benavidez, DO [facility]   03/07/24 Appointment Kareem Benavidez, DO [facility]   Showing future appointments within next 150 days with a meds authorizing provider and meeting all other requirements

## 2023-12-14 ENCOUNTER — OFFICE VISIT (OUTPATIENT)
Dept: FAMILY MEDICINE CLINIC | Age: 63
End: 2023-12-14
Payer: COMMERCIAL

## 2023-12-14 VITALS
TEMPERATURE: 97 F | HEIGHT: 63 IN | HEART RATE: 74 BPM | SYSTOLIC BLOOD PRESSURE: 126 MMHG | WEIGHT: 171 LBS | OXYGEN SATURATION: 98 % | BODY MASS INDEX: 30.3 KG/M2 | RESPIRATION RATE: 18 BRPM | DIASTOLIC BLOOD PRESSURE: 70 MMHG

## 2023-12-14 DIAGNOSIS — B37.31 VAGINAL YEAST INFECTION: ICD-10-CM

## 2023-12-14 DIAGNOSIS — J01.90 ACUTE RHINOSINUSITIS: Primary | ICD-10-CM

## 2023-12-14 DIAGNOSIS — R41.3 MEMORY PROBLEM: ICD-10-CM

## 2023-12-14 PROCEDURE — G8484 FLU IMMUNIZE NO ADMIN: HCPCS | Performed by: FAMILY MEDICINE

## 2023-12-14 PROCEDURE — G8427 DOCREV CUR MEDS BY ELIG CLIN: HCPCS | Performed by: FAMILY MEDICINE

## 2023-12-14 PROCEDURE — 3078F DIAST BP <80 MM HG: CPT | Performed by: FAMILY MEDICINE

## 2023-12-14 PROCEDURE — 3074F SYST BP LT 130 MM HG: CPT | Performed by: FAMILY MEDICINE

## 2023-12-14 PROCEDURE — 1036F TOBACCO NON-USER: CPT | Performed by: FAMILY MEDICINE

## 2023-12-14 PROCEDURE — 99214 OFFICE O/P EST MOD 30 MIN: CPT | Performed by: FAMILY MEDICINE

## 2023-12-14 PROCEDURE — G8417 CALC BMI ABV UP PARAM F/U: HCPCS | Performed by: FAMILY MEDICINE

## 2023-12-14 PROCEDURE — 3017F COLORECTAL CA SCREEN DOC REV: CPT | Performed by: FAMILY MEDICINE

## 2023-12-14 RX ORDER — FLUCONAZOLE 150 MG/1
150 TABLET ORAL
Qty: 2 TABLET | Refills: 0 | Status: SHIPPED | OUTPATIENT
Start: 2023-12-14 | End: 2023-12-20

## 2023-12-14 RX ORDER — AMOXICILLIN AND CLAVULANATE POTASSIUM 875; 125 MG/1; MG/1
1 TABLET, FILM COATED ORAL 2 TIMES DAILY
Qty: 14 TABLET | Refills: 0 | Status: SHIPPED | OUTPATIENT
Start: 2023-12-14 | End: 2023-12-21

## 2023-12-14 NOTE — PATIENT INSTRUCTIONS
LAB INSTRUCTIONS:    Please complete labs within 2 week(s). Please fast for 8 hours prior to lab collection. The clinic will call you within 1 week of collection. If you have not heard from us within that amount of time, please call us at [phone].

## 2024-01-09 ENCOUNTER — TELEPHONE (OUTPATIENT)
Dept: FAMILY MEDICINE CLINIC | Age: 64
End: 2024-01-09

## 2024-01-09 DIAGNOSIS — E53.8 B12 DEFICIENCY: Primary | ICD-10-CM

## 2024-01-09 DIAGNOSIS — E06.3 HYPOTHYROIDISM DUE TO HASHIMOTO'S THYROIDITIS: Chronic | ICD-10-CM

## 2024-01-09 DIAGNOSIS — E03.8 HYPOTHYROIDISM DUE TO HASHIMOTO'S THYROIDITIS: Chronic | ICD-10-CM

## 2024-01-09 RX ORDER — PERPHENAZINE/AMITRIPTYLINE HCL 2 MG-25 MG
1 TABLET ORAL DAILY
Qty: 30 TABLET | Refills: 3 | Status: SHIPPED | OUTPATIENT
Start: 2024-01-09

## 2024-01-09 NOTE — TELEPHONE ENCOUNTER
Left message on answering machine requesting pt to call back at earliest convenience.  Labs mailed to given address

## 2024-01-09 NOTE — TELEPHONE ENCOUNTER
----- Message from Yo Mello, DO sent at 1/9/2024 12:50 PM EST -----  Please let pt know that labs ok other than b12 borderline low  Recommend start b12 3000mcg Sublingual daily  Repeat lab in 6 wks, fasting  Let me know if questions, thanks!

## 2024-01-10 RX ORDER — LEVOTHYROXINE SODIUM 0.05 MG/1
50 TABLET ORAL DAILY
Qty: 90 TABLET | Refills: 3 | Status: SHIPPED | OUTPATIENT
Start: 2024-01-10

## 2024-01-10 NOTE — TELEPHONE ENCOUNTER
Future Appointments   Date Time Provider Department Center   1/16/2024  9:40 AM Yo Mello, DO [facility] [place]   3/7/2024 11:00 AM Yo Mello, DO [facility] [place]

## 2024-01-11 ENCOUNTER — PATIENT MESSAGE (OUTPATIENT)
Dept: FAMILY MEDICINE CLINIC | Age: 64
End: 2024-01-11

## 2024-01-11 DIAGNOSIS — T78.40XA ALLERGIC REACTION, INITIAL ENCOUNTER: ICD-10-CM

## 2024-01-11 RX ORDER — PREDNISONE 10 MG/1
TABLET ORAL
Qty: 30 TABLET | Refills: 0 | Status: SHIPPED | OUTPATIENT
Start: 2024-01-11 | End: 2024-01-27

## 2024-01-11 NOTE — TELEPHONE ENCOUNTER
From: Lolita Corona  To: Dr. Yo Mello  Sent: 1/11/2024 10:35 AM EST  Subject: swelling in both eyes    Hi Doc..  Both of my eyes are red and swollen, but the right eye is worse. They are flaky and hurt when I put on my moisturizer. I put Aquaphor on them and that doesn't hurt. I will make an appt with the allergist but they are only in on Fridays in [place], but I am moving and I have a delivery tomorrow, so that won't work.   Thank you for your help.  Lolita

## 2024-01-14 NOTE — PROGRESS NOTES
Chief Complaint   Patient presents with    Wart on L foot    Follow-up     Memory  b12     History obtained from the patient.    SUBJECTIVE:  Lolita Corona is a 63 y.o. female that presents today     -Wart bottom of L foot:  Lateral L foot  About 4 wks  Painful when walking      -? Memory issues LAST VISIT:  Thinks memory getting worse  Mostly short-term recall  No issues with ADLs or iADLs  Full time employed at [employer]    UPDATE TODAY:   Here for f/u  No change in above  KARLY WNL 22/22       -B12 def:  Noted on labs  Borderline low at 290  Started on b12 3000mcg SL about a wk ago      Age/Gender Health Maintenance    Lipid -   Lab Results   Component Value Date    CHOL 186 10/17/2023    CHOL 234 (H) 09/01/2023    CHOL 181 09/19/2022     Lab Results   Component Value Date    TRIG 156 (H) 10/17/2023    TRIG 167 (H) 09/01/2023    TRIG 165 (H) 09/19/2022     Lab Results   Component Value Date    HDL 74 10/17/2023    HDL 77 09/01/2023    HDL 67 09/19/2022     Lab Results   Component Value Date    LDLCALC 81 10/17/2023    LDLCALC 124 (H) 09/01/2023    LDLCALC 81 09/19/2022       TSH -   Lab Results   Component Value Date    TSH 2.62 01/08/2024       Colon Cancer Screening - Small polyp OCT 2013, repeat 7 to 10 years, per Sheik DE LA CRUZ./pt to schedule JAN 2024  Lung Cancer Screening - never soker    Tetanus - UTD AUG 2021  Influenza Vaccine - UTD FALL 2023  Pneumonia Vaccine - UTD AUG 2021/UTD PCV 20 in FEB 2023  Zoster - UTD x 2    Breast Cancer Screening - NEG SEPT 2023  Cervical Cancer Screening - hyst for benign reasons.   Osteoporosis Screening - neg June 2021      Diabetes Health Maintenance    A1C -   Lab Results   Component Value Date/Time    LABA1C 6.5 09/07/2023 08:48 AM    LABA1C 6.1 02/09/2023 10:16 AM    LABA1C 6.1 02/08/2022 09:36 AM    LABA1C 6.3 08/05/2021 12:47 PM    LABA1C 7.1 05/13/2021 12:00 AM       ACE/ARB - yes, cozaar  Eye - UTD JAN 2023  Foot - UTD SEPT 2023    Microal/Cr -   Lab Results   Component

## 2024-01-16 ENCOUNTER — OFFICE VISIT (OUTPATIENT)
Dept: FAMILY MEDICINE CLINIC | Age: 64
End: 2024-01-16
Payer: COMMERCIAL

## 2024-01-16 VITALS
HEIGHT: 63 IN | WEIGHT: 176 LBS | BODY MASS INDEX: 31.18 KG/M2 | HEART RATE: 100 BPM | DIASTOLIC BLOOD PRESSURE: 74 MMHG | SYSTOLIC BLOOD PRESSURE: 136 MMHG | TEMPERATURE: 97.8 F | OXYGEN SATURATION: 100 % | RESPIRATION RATE: 18 BRPM

## 2024-01-16 DIAGNOSIS — R41.3 MEMORY PROBLEM: ICD-10-CM

## 2024-01-16 DIAGNOSIS — B07.0 PLANTAR WART OF LEFT FOOT: Primary | ICD-10-CM

## 2024-01-16 DIAGNOSIS — Z23 NEED FOR INFLUENZA VACCINATION: ICD-10-CM

## 2024-01-16 DIAGNOSIS — E53.8 B12 DEFICIENCY: ICD-10-CM

## 2024-01-16 PROCEDURE — G8484 FLU IMMUNIZE NO ADMIN: HCPCS | Performed by: FAMILY MEDICINE

## 2024-01-16 PROCEDURE — 90694 VACC AIIV4 NO PRSRV 0.5ML IM: CPT | Performed by: FAMILY MEDICINE

## 2024-01-16 PROCEDURE — G8417 CALC BMI ABV UP PARAM F/U: HCPCS | Performed by: FAMILY MEDICINE

## 2024-01-16 PROCEDURE — 99213 OFFICE O/P EST LOW 20 MIN: CPT | Performed by: FAMILY MEDICINE

## 2024-01-16 PROCEDURE — 3017F COLORECTAL CA SCREEN DOC REV: CPT | Performed by: FAMILY MEDICINE

## 2024-01-16 PROCEDURE — 90471 IMMUNIZATION ADMIN: CPT | Performed by: FAMILY MEDICINE

## 2024-01-16 PROCEDURE — 3078F DIAST BP <80 MM HG: CPT | Performed by: FAMILY MEDICINE

## 2024-01-16 PROCEDURE — 3075F SYST BP GE 130 - 139MM HG: CPT | Performed by: FAMILY MEDICINE

## 2024-01-16 PROCEDURE — G8427 DOCREV CUR MEDS BY ELIG CLIN: HCPCS | Performed by: FAMILY MEDICINE

## 2024-01-16 PROCEDURE — 1036F TOBACCO NON-USER: CPT | Performed by: FAMILY MEDICINE

## 2024-01-16 PROCEDURE — 17110 DESTRUCTION B9 LES UP TO 14: CPT | Performed by: FAMILY MEDICINE

## 2024-01-16 ASSESSMENT — PATIENT HEALTH QUESTIONNAIRE - PHQ9
1. LITTLE INTEREST OR PLEASURE IN DOING THINGS: 0
2. FEELING DOWN, DEPRESSED OR HOPELESS: 0
SUM OF ALL RESPONSES TO PHQ9 QUESTIONS 1 & 2: 0
SUM OF ALL RESPONSES TO PHQ QUESTIONS 1-9: 0

## 2024-01-16 NOTE — PROGRESS NOTES
Vaccine Information Sheet, \"Influenza - Inactivated\"  given to Lolita Corona, or parent/legal guardian of  Lolita Corona and verbalized understanding.    Patient responses:    Have you ever had a reaction to a flu vaccine? No  Do you have an allergy to eggs, neomycin or polymixin?  No  Do you have an allergy to Thimerosal, contact lens solution, or Merthiolate? No  Have you ever had Guillian Baker Syndrome?  No  Do you have any current illness?  No  Do you have a temperature above 100 degrees? No  Are you pregnant? No  If pregnant, permission obtained from physician? No  Do you have an active neurological disorder? No      Flu vaccine given per order. Please see immunization tab.

## 2024-02-09 DIAGNOSIS — J30.2 SEASONAL ALLERGIES: ICD-10-CM

## 2024-02-09 RX ORDER — MONTELUKAST SODIUM 10 MG/1
10 TABLET ORAL NIGHTLY
Qty: 90 TABLET | Refills: 3 | Status: SHIPPED | OUTPATIENT
Start: 2024-02-09

## 2024-02-09 NOTE — TELEPHONE ENCOUNTER
Recent Visits  Date Type Provider Dept   01/16/24 Office Visit Yo Mello, DO [facility]   12/21/23 Office Visit Yo Mello, DO [facility]   12/14/23 Office Visit Yo Mello, DO [facility]   09/07/23 Office Visit Yo Mello, DO [facility]   06/26/23 Office Visit Yo Mello, DO [facility]   03/21/23 Office Visit John Terry APRN - CNP [facility]   02/09/23 Office Visit Yo Mello, DO [facility]   11/21/22 Office Visit Yo Mello, DO [facility]   10/11/22 Office Visit Yo Mello, DO [facility]   Showing recent visits within past 540 days with a meds authorizing provider and meeting all other requirements  Future Appointments  Date Type Provider Dept   03/07/24 Appointment Yo Mello, DO [facility]   Showing future appointments within next 150 days with a meds authorizing provider and meeting all other requirements

## 2024-02-25 ENCOUNTER — PATIENT MESSAGE (OUTPATIENT)
Dept: FAMILY MEDICINE CLINIC | Age: 64
End: 2024-02-25

## 2024-02-25 DIAGNOSIS — J30.2 SEASONAL ALLERGIES: Primary | ICD-10-CM

## 2024-02-26 RX ORDER — FEXOFENADINE HCL 180 MG/1
180 TABLET ORAL DAILY
Qty: 90 TABLET | Refills: 3 | Status: SHIPPED | OUTPATIENT
Start: 2024-02-26

## 2024-02-26 NOTE — TELEPHONE ENCOUNTER
From: Lolita Corona  To: Dr. Yo Mello  Sent: 2024 5:04 PM EST  Subject: Script     Hi there! My fexofenadine has  and it says I can't refill it . Would you please okay it to be refilled?   Thank you very much. I sure appreciate it.  Lolita

## 2024-03-06 ENCOUNTER — TELEPHONE (OUTPATIENT)
Dept: FAMILY MEDICINE CLINIC | Age: 64
End: 2024-03-06

## 2024-03-06 DIAGNOSIS — E53.8 B12 DEFICIENCY: Primary | ICD-10-CM

## 2024-03-06 LAB — VITAMIN B-12: 1149 PG/ML (ref 200–950)

## 2024-03-06 RX ORDER — MAGNESIUM 200 MG
1 TABLET ORAL DAILY
Qty: 90 TABLET | Refills: 3 | Status: SHIPPED | OUTPATIENT
Start: 2024-03-06

## 2024-03-06 NOTE — TELEPHONE ENCOUNTER
Pt informed and understanding with no further questions at this time.     Lab slip mailed to pt

## 2024-03-06 NOTE — PROGRESS NOTES
x 3     SECTION      x3    CHOLECYSTECTOMY      INTRACAPSULAR CATARACT EXTRACTION Left 10/13/2020    EXTRACAPSULAR CATARACT REMOVAL WITH INSERTION OF INTRAOCULAR LENS PROTHESIS LEFT performed by Nile Clemens MD at [facility]    JOINT REPLACEMENT Bilateral     knees    JEANINE AND BSO (CERVIX REMOVED)  1986    Endometriosis.       Allergies   Allergen Reactions    Statins      Liver issues    Valium [Diazepam] Other (See Comments)     Patient gets very irritated       Social History     Tobacco Use    Smoking status: Never    Smokeless tobacco: Never   Substance Use Topics    Alcohol use: No       Family History   Problem Relation Age of Onset    Diabetes Mother     High Blood Pressure Father     Heart Disease Father     Breast Cancer Other         1/2 sister    Colon Cancer Neg Hx          I have reviewed the patient's past medical history, past surgical history, allergies, medications, social and family history and I have made updates where appropriate.      Review of Systems  Positive responses are highlighted in bold    Constitutional:  Fever, Chills, Night Sweats, Fatigue, Unexpected changes in weight  HENT:  Ear pain, Tinnitus, Nosebleeds, Trouble swallowing, Hearing loss, Sore throat  Cardiovascular:  Chest Pain, Palpitations, Orthopnea, Paroxysmal Nocturnal Dyspnea  Respiratory:  Cough, Wheezing, Shortness of breath, Chest tightness, Apnea  Gastrointestinal:  Nausea, Vomiting, Diarrhea, Constipation, Heartburn, Blood in stool  Genitourinary:  Difficulty or painful urination, Flank pain, Change in frequency, Urgency  Skin:  Color change, Rash, Itching, Wound  Musculoskeletal:  Joint pain, Back pain, Gait problems, Joint swelling, Myalgias  Neurological:  Dizziness, Headaches, Presyncope, Numbness, Seizures, Tremors  Endocrine:  Heat Intolerance, Cold Intolerance, Polydipsia, Polyphagia, Polyuria      PHYSICAL EXAM:  Vitals:    24 1055   BP: 136/78   Pulse: 90   Resp: 18

## 2024-03-06 NOTE — TELEPHONE ENCOUNTER
----- Message from Yo Mello DO sent at 3/6/2024  6:50 AM EST -----  Please let pt know that b12 is improved  Dec b12 dose to 1000mcg SL daily  Labs in 6 wks, fasting  Let me know if questions, thanks!

## 2024-03-07 ENCOUNTER — OFFICE VISIT (OUTPATIENT)
Dept: FAMILY MEDICINE CLINIC | Age: 64
End: 2024-03-07
Payer: COMMERCIAL

## 2024-03-07 VITALS
SYSTOLIC BLOOD PRESSURE: 136 MMHG | OXYGEN SATURATION: 98 % | DIASTOLIC BLOOD PRESSURE: 78 MMHG | HEIGHT: 63 IN | WEIGHT: 172.8 LBS | HEART RATE: 90 BPM | BODY MASS INDEX: 30.62 KG/M2 | TEMPERATURE: 97.7 F | RESPIRATION RATE: 18 BRPM

## 2024-03-07 DIAGNOSIS — Z78.9 STATIN INTOLERANCE: Chronic | ICD-10-CM

## 2024-03-07 DIAGNOSIS — N95.1 VASOMOTOR SYMPTOMS DUE TO MENOPAUSE: ICD-10-CM

## 2024-03-07 DIAGNOSIS — E11.9 TYPE 2 DIABETES MELLITUS WITHOUT COMPLICATION, WITHOUT LONG-TERM CURRENT USE OF INSULIN (HCC): Primary | ICD-10-CM

## 2024-03-07 DIAGNOSIS — M62.830 BACK MUSCLE SPASM: ICD-10-CM

## 2024-03-07 DIAGNOSIS — E89.40 SURGICAL MENOPAUSE: Chronic | ICD-10-CM

## 2024-03-07 DIAGNOSIS — E78.5 DYSLIPIDEMIA: Chronic | ICD-10-CM

## 2024-03-07 DIAGNOSIS — E06.3 HYPOTHYROIDISM DUE TO HASHIMOTO'S THYROIDITIS: ICD-10-CM

## 2024-03-07 DIAGNOSIS — I10 HYPERTENSION, ESSENTIAL: ICD-10-CM

## 2024-03-07 DIAGNOSIS — E03.8 HYPOTHYROIDISM DUE TO HASHIMOTO'S THYROIDITIS: ICD-10-CM

## 2024-03-07 DIAGNOSIS — E53.8 B12 DEFICIENCY: ICD-10-CM

## 2024-03-07 LAB — HBA1C MFR BLD: 6.6 % (ref 4.3–5.7)

## 2024-03-07 PROCEDURE — G8484 FLU IMMUNIZE NO ADMIN: HCPCS | Performed by: FAMILY MEDICINE

## 2024-03-07 PROCEDURE — 3044F HG A1C LEVEL LT 7.0%: CPT | Performed by: FAMILY MEDICINE

## 2024-03-07 PROCEDURE — 99214 OFFICE O/P EST MOD 30 MIN: CPT | Performed by: FAMILY MEDICINE

## 2024-03-07 PROCEDURE — 1036F TOBACCO NON-USER: CPT | Performed by: FAMILY MEDICINE

## 2024-03-07 PROCEDURE — G8417 CALC BMI ABV UP PARAM F/U: HCPCS | Performed by: FAMILY MEDICINE

## 2024-03-07 PROCEDURE — 3017F COLORECTAL CA SCREEN DOC REV: CPT | Performed by: FAMILY MEDICINE

## 2024-03-07 PROCEDURE — G8427 DOCREV CUR MEDS BY ELIG CLIN: HCPCS | Performed by: FAMILY MEDICINE

## 2024-03-07 PROCEDURE — 3075F SYST BP GE 130 - 139MM HG: CPT | Performed by: FAMILY MEDICINE

## 2024-03-07 PROCEDURE — 2022F DILAT RTA XM EVC RTNOPTHY: CPT | Performed by: FAMILY MEDICINE

## 2024-03-07 PROCEDURE — 3078F DIAST BP <80 MM HG: CPT | Performed by: FAMILY MEDICINE

## 2024-03-07 RX ORDER — CYCLOBENZAPRINE HCL 10 MG
10 TABLET ORAL 3 TIMES DAILY PRN
Qty: 90 TABLET | Refills: 0 | Status: SHIPPED | OUTPATIENT
Start: 2024-03-07 | End: 2024-04-06

## 2024-04-03 ENCOUNTER — COMMUNITY OUTREACH (OUTPATIENT)
Dept: FAMILY MEDICINE CLINIC | Age: 64
End: 2024-04-03

## 2024-05-03 DIAGNOSIS — E78.5 HYPERLIPIDEMIA, UNSPECIFIED HYPERLIPIDEMIA TYPE: ICD-10-CM

## 2024-05-03 DIAGNOSIS — E11.59 TYPE 2 DIABETES MELLITUS WITH OTHER CIRCULATORY COMPLICATION, WITHOUT LONG-TERM CURRENT USE OF INSULIN (HCC): ICD-10-CM

## 2024-05-03 RX ORDER — ALIROCUMAB 75 MG/ML
INJECTION, SOLUTION SUBCUTANEOUS
Qty: 6 ML | Refills: 3 | Status: SHIPPED | OUTPATIENT
Start: 2024-05-03

## 2024-05-10 ENCOUNTER — TELEPHONE (OUTPATIENT)
Dept: CARDIOLOGY CLINIC | Age: 64
End: 2024-05-10

## 2024-05-10 NOTE — TELEPHONE ENCOUNTER
PA for Praluent started under [facility].    Mchugh Corona (Key: [id number])    Your information has been [facility] to [facility].

## 2024-07-02 DIAGNOSIS — E11.9 CONTROLLED TYPE 2 DIABETES MELLITUS WITHOUT COMPLICATION, WITHOUT LONG-TERM CURRENT USE OF INSULIN (HCC): ICD-10-CM

## 2024-07-02 NOTE — TELEPHONE ENCOUNTER
Recent Visits  Date Type Provider Dept   03/07/24 Office Visit Yo Mello, DO [facility]   01/16/24 Office Visit Yo Mello, DO [facility]   12/21/23 Office Visit Yo Mello, DO [facility]   12/14/23 Office Visit Yo Mello, DO [facility]   09/07/23 Office Visit Yo Mello, DO [facility]   06/26/23 Office Visit Yo Mello, DO [facility]   03/21/23 Office Visit John Terry APRN - CNP [facility]   02/09/23 Office Visit Yo Mello, DO [facility]   Showing recent visits within past 540 days with a meds authorizing provider and meeting all other requirements  Future Appointments  Date Type Provider Dept   09/09/24 Appointment Yo Mello, DO [facility]   Showing future appointments within next 150 days with a meds authorizing provider and meeting all other requirements

## 2024-07-05 ENCOUNTER — PATIENT MESSAGE (OUTPATIENT)
Dept: FAMILY MEDICINE CLINIC | Age: 64
End: 2024-07-05

## 2024-07-05 DIAGNOSIS — T78.40XA ALLERGIC REACTION, INITIAL ENCOUNTER: ICD-10-CM

## 2024-07-05 RX ORDER — PREDNISONE 10 MG/1
TABLET ORAL
Qty: 30 TABLET | Refills: 0 | Status: SHIPPED | OUTPATIENT
Start: 2024-07-05 | End: 2024-07-21

## 2024-07-05 NOTE — TELEPHONE ENCOUNTER
From: Lolita Corona  To: Dr. Yo Mello  Sent: 7/5/2024 11:46 AM EDT  Subject: Allergy reaction    Hi Doc!  Sorry to bother you. 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 My eyes are swollen and hurt and I work all weekend, so probably going to get worse. I tried to get in today, but I can't get in until Monday with Luis. Any way I could get a script before then?? Thanks!  Lolita

## 2024-07-06 LAB — VITAMIN B-12: 1768 PG/ML (ref 200–950)

## 2024-07-07 DIAGNOSIS — E53.8 B12 DEFICIENCY: Primary | ICD-10-CM

## 2024-07-08 ENCOUNTER — OFFICE VISIT (OUTPATIENT)
Dept: FAMILY MEDICINE CLINIC | Age: 64
End: 2024-07-08
Payer: MEDICARE

## 2024-07-08 ENCOUNTER — TELEPHONE (OUTPATIENT)
Dept: FAMILY MEDICINE CLINIC | Age: 64
End: 2024-07-08

## 2024-07-08 VITALS
OXYGEN SATURATION: 99 % | RESPIRATION RATE: 12 BRPM | WEIGHT: 174.8 LBS | SYSTOLIC BLOOD PRESSURE: 124 MMHG | BODY MASS INDEX: 30.97 KG/M2 | HEART RATE: 88 BPM | TEMPERATURE: 97.8 F | HEIGHT: 63 IN | DIASTOLIC BLOOD PRESSURE: 78 MMHG

## 2024-07-08 DIAGNOSIS — B35.1 ONYCHOMYCOSIS OF TOENAIL: Primary | ICD-10-CM

## 2024-07-08 DIAGNOSIS — T78.40XA ALLERGIC REACTION, INITIAL ENCOUNTER: ICD-10-CM

## 2024-07-08 PROCEDURE — G8417 CALC BMI ABV UP PARAM F/U: HCPCS | Performed by: NURSE PRACTITIONER

## 2024-07-08 PROCEDURE — 3078F DIAST BP <80 MM HG: CPT | Performed by: NURSE PRACTITIONER

## 2024-07-08 PROCEDURE — G8427 DOCREV CUR MEDS BY ELIG CLIN: HCPCS | Performed by: NURSE PRACTITIONER

## 2024-07-08 PROCEDURE — 1090F PRES/ABSN URINE INCON ASSESS: CPT | Performed by: NURSE PRACTITIONER

## 2024-07-08 PROCEDURE — 3017F COLORECTAL CA SCREEN DOC REV: CPT | Performed by: NURSE PRACTITIONER

## 2024-07-08 PROCEDURE — 3074F SYST BP LT 130 MM HG: CPT | Performed by: NURSE PRACTITIONER

## 2024-07-08 PROCEDURE — 99214 OFFICE O/P EST MOD 30 MIN: CPT | Performed by: NURSE PRACTITIONER

## 2024-07-08 PROCEDURE — 1036F TOBACCO NON-USER: CPT | Performed by: NURSE PRACTITIONER

## 2024-07-08 PROCEDURE — G8399 PT W/DXA RESULTS DOCUMENT: HCPCS | Performed by: NURSE PRACTITIONER

## 2024-07-08 PROCEDURE — 1123F ACP DISCUSS/DSCN MKR DOCD: CPT | Performed by: NURSE PRACTITIONER

## 2024-07-08 RX ORDER — TERBINAFINE HYDROCHLORIDE 250 MG/1
250 TABLET ORAL DAILY
Qty: 42 TABLET | Refills: 0 | Status: SHIPPED | OUTPATIENT
Start: 2024-07-08 | End: 2024-08-19

## 2024-07-08 RX ORDER — CICLOPIROX 80 MG/ML
SOLUTION TOPICAL
Qty: 6 ML | Refills: 4 | Status: SHIPPED | OUTPATIENT
Start: 2024-07-08

## 2024-07-08 RX ORDER — TERBINAFINE HYDROCHLORIDE 250 MG/1
250 TABLET ORAL DAILY
COMMUNITY
Start: 2024-05-01

## 2024-07-08 NOTE — TELEPHONE ENCOUNTER
Pt informed and understanding with no further questions at this time.      Pt will  labs after appt today.

## 2024-07-08 NOTE — TELEPHONE ENCOUNTER
----- Message from Yo Mello DO sent at 7/7/2024  7:20 AM EDT -----  Please let pt know that b12 remains elevated  Ok to stop b12 supplement altogether  Repeat b12 level in 3 months fasting    Lab order will need printed out

## 2024-07-08 NOTE — PROGRESS NOTES
SUBJECTIVE:  Lolita Corona is a 64 y.o. female for   Chief Complaint   Patient presents with    Eye Problem     Started Thursday. States allergic to plastic and cardboard. Swelling and redness in both eyes. States worse in the right eye. Has a feeling like she has glass in right eye in the corner. Also eyelid feels dry. 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    Redness - Yes  Burning - No  Matting or Drainage - No  Changes in vision - No  Associated symptoms -  eyelid swelling    Would also like to get back on Lamisil for right toenail onychomycosis  Has been on before via PCP and also podiatry    Patient Active Problem List   Diagnosis    Dyslipidemia    Hypertension, essential    Surgical menopause    Statin intolerance    Chronic venous insufficiency of lower extremity    DM2 (diabetes mellitus, type 2) ([facility])    Obesity (BMI 30-39.9)    Hypothyroidism    B12 deficiency          OBJECTIVE:  /78   Pulse 88   Temp 97.8 °F (36.6 °C) (Oral)   Resp 12   Ht 1.6 m (5' 3\")   Wt 79.3 kg (174 lb 12.8 oz)   SpO2 99%   BMI 30.96 kg/m²   She appears well; non-toxic and in no apparent distress.   HEENT -  Eyes: Lids - trivial swelling  conjunctivae: clear PERRL. No periorbital cellulitis. The corneas are clear. Visual acuity normal. No foreign objects identified.  Nasal mucosa normal and patent, mucous membranes moist, pharynx normal without lesions, neck supple without masses   Skin exam - normal coloration and turgor, no rashes, no suspicious skin lesions noted.  Psych:  Affect appropriate.  Thought process is normal without evidence of depression or psychosis.    Good insight and appropriae interaction.  Cognition and memory appear to be intact.  Right great toenail - thickening      ASSESSMENT & PLAN  Lolita was seen today for eye problem.    Diagnoses and all orders for

## 2024-08-08 ENCOUNTER — TELEPHONE (OUTPATIENT)
Dept: FAMILY MEDICINE CLINIC | Age: 64
End: 2024-08-08

## 2024-08-08 DIAGNOSIS — E11.9 TYPE 2 DIABETES MELLITUS WITHOUT COMPLICATION, WITHOUT LONG-TERM CURRENT USE OF INSULIN (HCC): Primary | ICD-10-CM

## 2024-08-08 NOTE — TELEPHONE ENCOUNTER
Pt due for fasting labs prior to next apt on 9/9/2024. Please call to have pt complete this. Thanks!    ASSESSMENT & PLAN   Diagnosis Orders   1. Type 2 diabetes mellitus without complication, without long-term current use of insulin (HCC)  CBC with Auto Differential    Comprehensive Metabolic Panel    Lipid Panel    Microalbumin / Creatinine Urine Ratio    TSH with Reflex        Future Appointments   Date Time Provider Department Center   9/9/2024 12:40 PM Yo Mello, DO [facility] DEP

## 2024-08-08 NOTE — TELEPHONE ENCOUNTER
Left detailed message for labs to be completed. Okay per HIPAA. Requested call back if any further questions    Lab slip mailed to pt

## 2024-08-23 ENCOUNTER — PATIENT MESSAGE (OUTPATIENT)
Dept: FAMILY MEDICINE CLINIC | Age: 64
End: 2024-08-23

## 2024-08-23 DIAGNOSIS — E78.5 HYPERLIPIDEMIA, UNSPECIFIED HYPERLIPIDEMIA TYPE: ICD-10-CM

## 2024-08-26 RX ORDER — EZETIMIBE 10 MG/1
10 TABLET ORAL DAILY
Qty: 90 TABLET | Refills: 3 | Status: SHIPPED | OUTPATIENT
Start: 2024-08-26

## 2024-08-26 NOTE — TELEPHONE ENCOUNTER
Recent Visits  Date Type Provider Dept   07/08/24 Office Visit Luis Oakley, APRN - CNP [facility]   03/07/24 Office Visit Yo Mello, DO [facility]   01/16/24 Office Visit Yo Mello, DO [facility]   12/21/23 Office Visit Yo Mello, DO [facility]   12/14/23 Office Visit Yo Mello, DO [facility]   09/07/23 Office Visit Yo Mello, DO [facility]   06/26/23 Office Visit Yo Mello, DO [facility]   03/21/23 Office Visit John Terry, APRN - CNP [facility]   Showing recent visits within past 540 days with a meds authorizing provider and meeting all other requirements  Future Appointments  Date Type Provider Dept   09/09/24 Appointment Yo Mello, DO [facility]   Showing future appointments within next 150 days with a meds authorizing provider and meeting all other requirements

## 2024-09-05 LAB
BASOPHILS ABSOLUTE: 0.02 K/UL (ref 0–0.2)
BASOPHILS RELATIVE PERCENT: 0.4 %
CREATINE, URINE: 208 MG/DL
EOSINOPHILS ABSOLUTE: 0.03 K/UL (ref 0–0.5)
EOSINOPHILS RELATIVE PERCENT: 0.7 %
HCT VFR BLD CALC: 38.8 % (ref 34–45)
HEMOGLOBIN: 12.8 G/DL (ref 11.5–15.5)
IMMATURE GRANS (ABS): 0.01
IMMATURE GRANULOCYTES %: 0.2 %
LYMPHOCYTES ABSOLUTE: 1.84 K/UL (ref 1–4)
LYMPHOCYTES RELATIVE PERCENT: 39.9 %
MCH RBC QN AUTO: 28.8 PG (ref 25–33)
MCHC RBC AUTO-ENTMCNC: 33 G/DL (ref 31–36)
MCV RBC AUTO: 87.2 FL (ref 80–99)
MICROALBUMIN/CREAT 24H UR: 2.7 MG/DL
MICROALBUMIN/CREAT UR-RTO: 13 MG/G
MONOCYTES ABSOLUTE: 0.48 K/UL (ref 0.2–1)
MONOCYTES RELATIVE PERCENT: 10.4 %
NEUTROPHILS ABSOLUTE: 2.23 K/UL (ref 1.5–7.5)
NEUTROPHILS RELATIVE PERCENT: 48.4 %
PDW BLD-RTO: 12.6 % (ref 11.5–15)
PLATELET # BLD: 314 K/UL (ref 130–400)
PMV BLD AUTO: 10.8 FL (ref 9.3–13)
RBC # BLD: 4.45 M/UL (ref 3.8–5.4)
WBC # BLD: 4.6 K/UL (ref 3.5–11)

## 2024-09-06 ENCOUNTER — TELEPHONE (OUTPATIENT)
Dept: FAMILY MEDICINE CLINIC | Age: 64
End: 2024-09-06

## 2024-09-06 LAB
ALBUMIN: 4.4 G/DL (ref 3.5–5.2)
ALK PHOSPHATASE: 57 U/L (ref 40–140)
ALT SERPL-CCNC: 20 U/L (ref 5–40)
ANION GAP SERPL CALCULATED.3IONS-SCNC: 10 MEQ/L (ref 7–16)
AST SERPL-CCNC: 21 U/L (ref 9–40)
BILIRUB SERPL-MCNC: 0.3 MG/DL
BUN BLDV-MCNC: 11 MG/DL (ref 8–23)
CALCIUM SERPL-MCNC: 9.8 MG/DL (ref 8.5–10.5)
CHLORIDE BLD-SCNC: 102 MEQ/L (ref 95–107)
CHOLESTEROL, TOTAL: 238 MG/DL
CHOLESTEROL/HDL RATIO: 2.9 RATIO
CO2: 29 MEQ/L (ref 19–31)
CREAT SERPL-MCNC: 0.84 MG/DL (ref 0.6–1.3)
EGFR IF NONAFRICAN AMERICAN: 77 ML/MIN/1.73
GLUCOSE: 98 MG/DL (ref 70–99)
HDLC SERPL-MCNC: 81 MG/DL
LDL CHOLESTEROL: 124 MG/DL
LDL/HDL RATIO: 1.5 RATIO
POTASSIUM SERPL-SCNC: 4.9 MEQ/L (ref 3.5–5.4)
SODIUM BLD-SCNC: 141 MEQ/L (ref 133–146)
TOTAL PROTEIN: 6.6 G/DL (ref 6.1–8.3)
TRIGL SERPL-MCNC: 166 MG/DL
TSH SERPL DL<=0.05 MIU/L-ACNC: 2.69 UIU/ML (ref 0.4–4.1)
VLDLC SERPL CALC-MCNC: 33 MG/DL

## 2024-09-06 NOTE — TELEPHONE ENCOUNTER
----- Message from Dr. Yo Mello, DO sent at 9/6/2024  6:36 AM EDT -----  Please let pt know that labs overall look good  Cholesterol a little higher than prior  Will discuss at f/u Monday  Let me know if questions, thanks!

## 2024-09-08 SDOH — HEALTH STABILITY: PHYSICAL HEALTH: ON AVERAGE, HOW MANY DAYS PER WEEK DO YOU ENGAGE IN MODERATE TO STRENUOUS EXERCISE (LIKE A BRISK WALK)?: 0 DAYS

## 2024-09-08 SDOH — HEALTH STABILITY: PHYSICAL HEALTH: ON AVERAGE, HOW MANY MINUTES DO YOU ENGAGE IN EXERCISE AT THIS LEVEL?: 0 MIN

## 2024-09-08 ASSESSMENT — PATIENT HEALTH QUESTIONNAIRE - PHQ9
SUM OF ALL RESPONSES TO PHQ QUESTIONS 1-9: 0
2. FEELING DOWN, DEPRESSED OR HOPELESS: NOT AT ALL
SUM OF ALL RESPONSES TO PHQ QUESTIONS 1-9: 0
SUM OF ALL RESPONSES TO PHQ9 QUESTIONS 1 & 2: 0
SUM OF ALL RESPONSES TO PHQ QUESTIONS 1-9: 0
SUM OF ALL RESPONSES TO PHQ QUESTIONS 1-9: 0
1. LITTLE INTEREST OR PLEASURE IN DOING THINGS: NOT AT ALL

## 2024-09-08 ASSESSMENT — LIFESTYLE VARIABLES
HOW OFTEN DO YOU HAVE A DRINK CONTAINING ALCOHOL: NEVER
HOW OFTEN DO YOU HAVE SIX OR MORE DRINKS ON ONE OCCASION: 1
HOW MANY STANDARD DRINKS CONTAINING ALCOHOL DO YOU HAVE ON A TYPICAL DAY: 0
HOW OFTEN DO YOU HAVE A DRINK CONTAINING ALCOHOL: 1
HOW MANY STANDARD DRINKS CONTAINING ALCOHOL DO YOU HAVE ON A TYPICAL DAY: PATIENT DOES NOT DRINK

## 2024-09-09 ENCOUNTER — OFFICE VISIT (OUTPATIENT)
Dept: FAMILY MEDICINE CLINIC | Age: 64
End: 2024-09-09

## 2024-09-09 VITALS
TEMPERATURE: 97.6 F | OXYGEN SATURATION: 99 % | DIASTOLIC BLOOD PRESSURE: 80 MMHG | HEART RATE: 81 BPM | RESPIRATION RATE: 18 BRPM | HEIGHT: 63 IN | SYSTOLIC BLOOD PRESSURE: 126 MMHG | WEIGHT: 176.8 LBS | BODY MASS INDEX: 31.33 KG/M2

## 2024-09-09 DIAGNOSIS — Z00.00 WELCOME TO MEDICARE PREVENTIVE VISIT: Primary | ICD-10-CM

## 2024-09-09 DIAGNOSIS — Z78.9 STATIN INTOLERANCE: Chronic | ICD-10-CM

## 2024-09-09 DIAGNOSIS — E89.40 SURGICAL MENOPAUSE: Chronic | ICD-10-CM

## 2024-09-09 DIAGNOSIS — E78.5 DYSLIPIDEMIA: Chronic | ICD-10-CM

## 2024-09-09 DIAGNOSIS — E11.9 TYPE 2 DIABETES MELLITUS WITHOUT COMPLICATION, WITHOUT LONG-TERM CURRENT USE OF INSULIN (HCC): ICD-10-CM

## 2024-09-09 DIAGNOSIS — E53.8 B12 DEFICIENCY: ICD-10-CM

## 2024-09-09 DIAGNOSIS — Z78.0 POST-MENOPAUSE: ICD-10-CM

## 2024-09-09 DIAGNOSIS — I10 HYPERTENSION, ESSENTIAL: Chronic | ICD-10-CM

## 2024-09-09 DIAGNOSIS — N95.1 VASOMOTOR SYMPTOMS DUE TO MENOPAUSE: ICD-10-CM

## 2024-09-09 DIAGNOSIS — E06.3 HYPOTHYROIDISM DUE TO HASHIMOTO'S THYROIDITIS: ICD-10-CM

## 2024-09-09 DIAGNOSIS — Z12.31 ENCOUNTER FOR SCREENING MAMMOGRAM FOR MALIGNANT NEOPLASM OF BREAST: ICD-10-CM

## 2024-09-09 DIAGNOSIS — E03.8 HYPOTHYROIDISM DUE TO HASHIMOTO'S THYROIDITIS: ICD-10-CM

## 2024-09-09 LAB — HBA1C MFR BLD: 6.8 % (ref 4.3–5.7)

## 2024-09-10 ENCOUNTER — CLINICAL DOCUMENTATION (OUTPATIENT)
Dept: SPIRITUAL SERVICES | Age: 64
End: 2024-09-10

## 2024-10-03 ENCOUNTER — TELEPHONE (OUTPATIENT)
Dept: FAMILY MEDICINE CLINIC | Age: 64
End: 2024-10-03

## 2024-10-03 NOTE — TELEPHONE ENCOUNTER
Left detailed message of mammogram and dexa scan results. Okay per HIPAA. Requested call back at [phone]  if they have any further questions.

## 2024-10-03 NOTE — TELEPHONE ENCOUNTER
----- Message from Dr. Yo Mello, DO sent at 10/3/2024  2:42 PM EDT -----  Please let pt know that mammogram is normal as well as bone density scan.   Let me know if questions, thanks!

## 2024-10-18 ENCOUNTER — OFFICE VISIT (OUTPATIENT)
Dept: FAMILY MEDICINE CLINIC | Age: 64
End: 2024-10-18
Payer: MEDICARE

## 2024-10-18 VITALS
HEIGHT: 63 IN | SYSTOLIC BLOOD PRESSURE: 122 MMHG | DIASTOLIC BLOOD PRESSURE: 72 MMHG | WEIGHT: 173 LBS | HEART RATE: 73 BPM | RESPIRATION RATE: 18 BRPM | BODY MASS INDEX: 30.65 KG/M2 | TEMPERATURE: 97.1 F | OXYGEN SATURATION: 99 %

## 2024-10-18 DIAGNOSIS — B96.89 BACTERIAL CONJUNCTIVITIS OF BOTH EYES: ICD-10-CM

## 2024-10-18 DIAGNOSIS — K21.9 GASTROESOPHAGEAL REFLUX DISEASE, UNSPECIFIED WHETHER ESOPHAGITIS PRESENT: ICD-10-CM

## 2024-10-18 DIAGNOSIS — M25.442 SWELLING OF HAND JOINT, LEFT: Primary | ICD-10-CM

## 2024-10-18 DIAGNOSIS — M25.441 SWELLING OF HAND JOINT, RIGHT: ICD-10-CM

## 2024-10-18 DIAGNOSIS — H10.9 BACTERIAL CONJUNCTIVITIS OF BOTH EYES: ICD-10-CM

## 2024-10-18 PROCEDURE — 1123F ACP DISCUSS/DSCN MKR DOCD: CPT | Performed by: FAMILY MEDICINE

## 2024-10-18 PROCEDURE — 99214 OFFICE O/P EST MOD 30 MIN: CPT | Performed by: FAMILY MEDICINE

## 2024-10-18 PROCEDURE — 1090F PRES/ABSN URINE INCON ASSESS: CPT | Performed by: FAMILY MEDICINE

## 2024-10-18 PROCEDURE — 3017F COLORECTAL CA SCREEN DOC REV: CPT | Performed by: FAMILY MEDICINE

## 2024-10-18 PROCEDURE — G8484 FLU IMMUNIZE NO ADMIN: HCPCS | Performed by: FAMILY MEDICINE

## 2024-10-18 PROCEDURE — 3074F SYST BP LT 130 MM HG: CPT | Performed by: FAMILY MEDICINE

## 2024-10-18 PROCEDURE — 3078F DIAST BP <80 MM HG: CPT | Performed by: FAMILY MEDICINE

## 2024-10-18 PROCEDURE — G8427 DOCREV CUR MEDS BY ELIG CLIN: HCPCS | Performed by: FAMILY MEDICINE

## 2024-10-18 PROCEDURE — G8417 CALC BMI ABV UP PARAM F/U: HCPCS | Performed by: FAMILY MEDICINE

## 2024-10-18 PROCEDURE — G8399 PT W/DXA RESULTS DOCUMENT: HCPCS | Performed by: FAMILY MEDICINE

## 2024-10-18 PROCEDURE — 1036F TOBACCO NON-USER: CPT | Performed by: FAMILY MEDICINE

## 2024-10-18 RX ORDER — PANTOPRAZOLE SODIUM 20 MG/1
20 TABLET, DELAYED RELEASE ORAL
Qty: 30 TABLET | Refills: 2 | Status: SHIPPED | OUTPATIENT
Start: 2024-10-18

## 2024-10-18 RX ORDER — TOBRAMYCIN 3 MG/ML
1 SOLUTION/ DROPS OPHTHALMIC EVERY 6 HOURS
Qty: 5 ML | Refills: 0 | Status: SHIPPED | OUTPATIENT
Start: 2024-10-18 | End: 2024-10-28

## 2024-10-18 NOTE — PROGRESS NOTES
72   Resp: 18   Temp: 97.1 °F (36.2 °C)   SpO2: 99%   Weight: 78.5 kg (173 lb)   Height: 1.6 m (5' 2.99\")     Body mass index is 30.65 kg/m².     VS Reviewed  General Appearance: A&O x 3, No acute distress,well developed and well- nourished  Eyes: pupils equal, round, and reactive to light, extraocular eye movements intact, conjunctivae with mild erythema. No eye lids without erythema  ENT: bilateral TM normal without fluid or infection, neck without nodes, throat normal without erythema or exudate, sinuses nontender, post nasal drip noted, nasal mucosa congested, and Oropharynx clear, without erythema, exudate, or thrush.  Neck: supple and non-tender without mass, no thyromegaly or thyroid nodules, no cervical lymphadenopathy  Pulmonary/Chest: clear to auscultation bilaterally- no wheezes, rales or rhonchi, normal air movement, no respiratory distress or retractions  Cardiovascular: S1 and S2 auscultated w/ RRR. No murmurs, rubs, clicks, or gallops, distal pulses intact.  Abdomen: soft, non-tender, non-distended, bowel sounds physiologic,  no rebound or guarding, no masses or hernias noted. Liver and spleen without enlargement.   Extremities: no cyanosis, clubbing or edema of the lower extremities.   Skin: warm and dry, no rash or erythema  Right and Left Wrist and Hand Exam:  There is mild  swelling.  There is not skeletal deformity.  Wrist range of motion is not limited by pain.  Digital range of motion is not limited by pain and swelling.   neg TTP at [place].  FDS,FDP and Common Extensor tendon function is intact to each digit.  FPL and EPL tendon function is intact to the thumb.  Phalen's test negative.  Tinnel's test negative.  Finkelstein's test negative.  Skin color, texture, turgor is normal.  No rashes or lesions found of the injured extremity.  Vascular exam shows normal  And good capillary refill bilaterally.  Sensation is subjectively normal in the whole hand.  Digits are normally sensate.

## 2024-10-18 NOTE — PATIENT INSTRUCTIONS
LAB INSTRUCTIONS:    Please complete labs within 1 week(s).    Please fast for 8 hours prior to lab collection.    The clinic will call you within 1 week of collection. If you have not heard from us within that amount of time, please call us at [phone].

## 2024-11-07 ENCOUNTER — HOSPITAL ENCOUNTER (OUTPATIENT)
Dept: GENERAL RADIOLOGY | Age: 64
Discharge: HOME | End: 2024-11-07
Payer: MEDICARE

## 2024-11-07 ENCOUNTER — HOSPITAL ENCOUNTER (OUTPATIENT)
Age: 64
Discharge: HOME | End: 2024-11-07
Payer: MEDICARE

## 2024-11-07 DIAGNOSIS — M25.442 SWELLING OF HAND JOINT, LEFT: ICD-10-CM

## 2024-11-07 DIAGNOSIS — M25.441 SWELLING OF HAND JOINT, RIGHT: ICD-10-CM

## 2024-11-07 PROCEDURE — 73130 X-RAY EXAM OF HAND: CPT

## 2024-11-08 ENCOUNTER — TELEPHONE (OUTPATIENT)
Dept: FAMILY MEDICINE CLINIC | Age: 64
End: 2024-11-08

## 2024-11-08 ENCOUNTER — PATIENT MESSAGE (OUTPATIENT)
Dept: FAMILY MEDICINE CLINIC | Age: 64
End: 2024-11-08

## 2024-11-08 DIAGNOSIS — T78.40XA ALLERGIC REACTION, INITIAL ENCOUNTER: Primary | ICD-10-CM

## 2024-11-08 LAB
ALBUMIN: 3.9 G/DL (ref 3.5–5.2)
ALK PHOSPHATASE: 61 U/L (ref 30–134)
ALT SERPL-CCNC: 17 U/L (ref 5–33)
ANION GAP SERPL CALCULATED.3IONS-SCNC: 16 MMOL/L (ref 7–16)
AST SERPL-CCNC: 20 U/L (ref 9–40)
BASOPHILS ABSOLUTE: 0.01 K/UL (ref 0–0.2)
BASOPHILS RELATIVE PERCENT: 0.2 % (ref 0–2)
BILIRUB SERPL-MCNC: 0.4 MG/DL
BUN BLDV-MCNC: 13 MG/DL (ref 8–23)
C-REACTIVE PROTEIN: 0.2 MG/DL
CALCIUM SERPL-MCNC: 9.5 MG/DL (ref 8.6–10.5)
CCP IGG ANTIBODIES: <0.5 U/ML
CHLORIDE BLD-SCNC: 102 MMOL/L (ref 96–107)
CO2: 26 MMOL/L (ref 18–32)
CREAT SERPL-MCNC: 0.86 MG/DL (ref 0.51–1.15)
EGFR IF NONAFRICAN AMERICAN: 75 ML/MIN/1.73M2
EOSINOPHILS ABSOLUTE: 0.05 K/UL (ref 0–0.8)
EOSINOPHILS RELATIVE PERCENT: 1.2 % (ref 0–5)
GLUCOSE: 108 MG/DL (ref 70–100)
HCT VFR BLD CALC: 39 % (ref 35–47)
HEMOGLOBIN: 13 G/DL (ref 11.9–16)
IMMATURE GRANS (ABS): 0.01 K/UL (ref 0–0.06)
IMMATURE GRANULOCYTES %: 0.2 % (ref 0–2)
LYMPHOCYTES ABSOLUTE: 1.46 K/UL (ref 0.9–5.2)
LYMPHOCYTES RELATIVE PERCENT: 33.8 % (ref 20–45)
MCH RBC QN AUTO: 28.3 PG (ref 26–33)
MCHC RBC AUTO-ENTMCNC: 33.3 G/DL (ref 32–35)
MCV RBC AUTO: 85 FL (ref 75–100)
MONOCYTES ABSOLUTE: 0.45 K/UL (ref 0.1–1)
MONOCYTES RELATIVE PERCENT: 10.4 % (ref 0–13)
NEUTROPHILS ABSOLUTE: 2.34 K/UL (ref 1.9–8)
NEUTROPHILS RELATIVE PERCENT: 54.2 % (ref 45–75)
PDW BLD-RTO: 11.9 % (ref 11.2–14.8)
PLATELET # BLD: 306 THOUS/CMM (ref 140–440)
POTASSIUM SERPL-SCNC: 4.4 MMOL/L (ref 3.5–5.4)
RBC # BLD: 4.59 MILL/CMM (ref 3.8–5.2)
RHEUMATOID FACTOR: 14 IU/ML
SED RATE, AUTOMATED: 7 MM/HR (ref 0–29)
SODIUM BLD-SCNC: 144 MMOL/L (ref 135–148)
TOTAL PROTEIN: 6.3 G/DL (ref 6–8.3)
URIC ACID: 5.5 MG/DL (ref 2.4–5.7)
VITAMIN B-12: 560 PG/ML (ref 232–1245)
WBC # BLD: 4.3 THDS/CMM (ref 3.6–11)

## 2024-11-08 NOTE — TELEPHONE ENCOUNTER
----- Message from Dr. Yo Mello, DO sent at 11/8/2024  6:11 AM EST -----  Please let pt know that xrays show age related arthritic changes  No obvious inflammatory changes  Will await lab results.   Let me know if questions, thanks!

## 2024-11-08 NOTE — TELEPHONE ENCOUNTER
I called and spoke to Mchugh and she verbalized understandings and had no questions at this time.

## 2024-11-09 LAB — ANTI-NUCLEAR ANTIBODY (ANA): NEGATIVE

## 2024-11-11 ENCOUNTER — TELEPHONE (OUTPATIENT)
Dept: FAMILY MEDICINE CLINIC | Age: 64
End: 2024-11-11

## 2024-11-11 RX ORDER — PREDNISONE 10 MG/1
TABLET ORAL
Qty: 30 TABLET | Refills: 0 | Status: SHIPPED | OUTPATIENT
Start: 2024-11-11 | End: 2024-11-27

## 2024-11-11 NOTE — TELEPHONE ENCOUNTER
----- Message from Dr. Yo Mello, DO sent at 11/10/2024  9:14 AM EST -----  Please let pt know that labs look good. No concerning findings. Let me know if questions, thanks!

## 2024-11-11 NOTE — TELEPHONE ENCOUNTER
Recent Visits  Date Type Provider Dept   10/18/24 Office Visit Yo Mello, DO [facility]   09/09/24 Office Visit Yo Mello, DO [facility]   07/08/24 Office Visit Luis Oakley, NINA - CNP [facility]   03/07/24 Office Visit Yo Mello, DO [facility]   01/16/24 Office Visit Yo Mello, DO [facility]   12/21/23 Office Visit Yo Mello, DO [facility]   12/14/23 Office Visit Yo Mello, DO [facility]   09/07/23 Office Visit Yo Mello, DO [facility]   06/26/23 Office Visit Yo Mello, DO [facility]   Showing recent visits within past 540 days with a meds authorizing provider and meeting all other requirements  Future Appointments  Date Type Provider Dept   11/21/24 Appointment Yo Mello, DO [facility]   03/10/25 Appointment Yo Mello, DO [facility]   Showing future appointments within next 150 days with a meds authorizing provider and meeting all other requirements

## 2024-11-11 NOTE — TELEPHONE ENCOUNTER
Diagnosis Orders   1. Allergic reaction, initial encounter  predniSONE (DELTASONE) 10 MG tablet

## 2024-11-20 NOTE — PROGRESS NOTES
is intact to each digit.  FPL and EPL tendon function is intact to the thumb.  Phalen's test negative.  Tinnel's test negative.  Finkelstein's test negative.  Skin color, texture, turgor is normal.  No rashes or lesions found of the injured extremity.  Vascular exam shows normal  And good capillary refill bilaterally.  Sensation is subjectively normal in the whole hand.  Digits are normally sensate.       Office Visit on 10/18/2024   Component Date Value Ref Range Status    Glucose 11/07/2024 108 (H)  70 - 100 mg/dL Final    BUN 11/07/2024 13  8 - 23 mg/dL Final    Calcium 11/07/2024 9.5  8.6 - 10.5 mg/dL Final    Creatinine 11/07/2024 0.86  0.51 - 1.15 mg/dL Final    eGFR NON-AA 11/07/2024 75  >59 mL/min/1.73m2 Final    Sodium 11/07/2024 144  135 - 148 mmol/L Final    Potassium 11/07/2024 4.4  3.5 - 5.4 mmol/L Final    Chloride 11/07/2024 102  96 - 107 mmol/L Final    CO2 11/07/2024 26  18 - 32 mmol/L Final    Anion Gap 11/07/2024 16  7 - 16 mmol/L Final    Total Bilirubin 11/07/2024 0.4  <1.3 mg/dL Final    Alk Phosphatase 11/07/2024 61  30 - 134 U/L Final    AST 11/07/2024 20  9 - 40 U/L Final    ALT 11/07/2024 17  5 - 33 U/L Final    Total Protein 11/07/2024 6.3  6.0 - 8.3 g/dL Final    Albumin 11/07/2024 3.9  3.5 - 5.2 g/dL Final    Uric Acid 11/07/2024 5.5  2.4 - 5.7 mg/dL Final    WBC 11/07/2024 4.3  3.6 - 11.0 THDS/CMM Final    RBC 11/07/2024 4.59  3.80 - 5.20 MILL/CMM Final    Hemoglobin 11/07/2024 13.0  11.9 - 16.0 G/DL Final    Hematocrit 11/07/2024 39.0  35 - 47 % Final    MCV 11/07/2024 85  75 - 100 fL Final    MCH 11/07/2024 28.3  26.0 - 33.0 pg Final    MCHC 11/07/2024 33.3  32.0 - 35.0 g/dl Final    RDW 11/07/2024 11.9  11.2 - 14.8 % Final    Platelets 11/07/2024 306  140 - 440 THOUS/CMM Final    Neutrophils % 11/07/2024 54.2  45 - 75 % Final    Lymphocytes % 11/07/2024 33.8  20 - 45 % Final    Monocytes % 11/07/2024 10.4  0 - 13 % Final    Eosinophils % 11/07/2024 1.2  0 - 5 % Final    Basophils %

## 2024-11-21 ENCOUNTER — OFFICE VISIT (OUTPATIENT)
Dept: FAMILY MEDICINE CLINIC | Age: 64
End: 2024-11-21
Payer: MEDICARE

## 2024-11-21 VITALS
DIASTOLIC BLOOD PRESSURE: 74 MMHG | OXYGEN SATURATION: 99 % | TEMPERATURE: 97.1 F | RESPIRATION RATE: 16 BRPM | WEIGHT: 171.4 LBS | HEART RATE: 78 BPM | SYSTOLIC BLOOD PRESSURE: 124 MMHG | HEIGHT: 63 IN | BODY MASS INDEX: 30.37 KG/M2

## 2024-11-21 DIAGNOSIS — M19.042 PRIMARY OSTEOARTHRITIS OF BOTH HANDS: Primary | ICD-10-CM

## 2024-11-21 DIAGNOSIS — J01.90 ACUTE RHINOSINUSITIS: ICD-10-CM

## 2024-11-21 DIAGNOSIS — K21.9 GASTROESOPHAGEAL REFLUX DISEASE, UNSPECIFIED WHETHER ESOPHAGITIS PRESENT: ICD-10-CM

## 2024-11-21 DIAGNOSIS — M19.041 PRIMARY OSTEOARTHRITIS OF BOTH HANDS: Primary | ICD-10-CM

## 2024-11-21 PROCEDURE — G8427 DOCREV CUR MEDS BY ELIG CLIN: HCPCS | Performed by: FAMILY MEDICINE

## 2024-11-21 PROCEDURE — 3017F COLORECTAL CA SCREEN DOC REV: CPT | Performed by: FAMILY MEDICINE

## 2024-11-21 PROCEDURE — G8484 FLU IMMUNIZE NO ADMIN: HCPCS | Performed by: FAMILY MEDICINE

## 2024-11-21 PROCEDURE — 1090F PRES/ABSN URINE INCON ASSESS: CPT | Performed by: FAMILY MEDICINE

## 2024-11-21 PROCEDURE — 99214 OFFICE O/P EST MOD 30 MIN: CPT | Performed by: FAMILY MEDICINE

## 2024-11-21 PROCEDURE — G8417 CALC BMI ABV UP PARAM F/U: HCPCS | Performed by: FAMILY MEDICINE

## 2024-11-21 PROCEDURE — 3074F SYST BP LT 130 MM HG: CPT | Performed by: FAMILY MEDICINE

## 2024-11-21 PROCEDURE — 1036F TOBACCO NON-USER: CPT | Performed by: FAMILY MEDICINE

## 2024-11-21 PROCEDURE — 3078F DIAST BP <80 MM HG: CPT | Performed by: FAMILY MEDICINE

## 2024-11-21 PROCEDURE — 1123F ACP DISCUSS/DSCN MKR DOCD: CPT | Performed by: FAMILY MEDICINE

## 2024-11-21 PROCEDURE — G8399 PT W/DXA RESULTS DOCUMENT: HCPCS | Performed by: FAMILY MEDICINE

## 2024-11-21 RX ORDER — CEFDINIR 300 MG/1
300 CAPSULE ORAL 2 TIMES DAILY
Qty: 20 CAPSULE | Refills: 0 | Status: SHIPPED | OUTPATIENT
Start: 2024-11-21 | End: 2024-12-01

## 2024-11-21 RX ORDER — ACETAMINOPHEN 325 MG/1
650 TABLET ORAL 2 TIMES DAILY
COMMUNITY

## 2024-11-24 DIAGNOSIS — I10 HYPERTENSION, ESSENTIAL: ICD-10-CM

## 2024-11-25 RX ORDER — LOSARTAN POTASSIUM AND HYDROCHLOROTHIAZIDE 12.5; 1 MG/1; MG/1
1 TABLET ORAL DAILY
Qty: 90 TABLET | Refills: 3 | Status: SHIPPED | OUTPATIENT
Start: 2024-11-25

## 2024-11-25 NOTE — TELEPHONE ENCOUNTER
Recent Visits  Date Type Provider Dept   11/21/24 Office Visit Yo Mello, DO [facility]   10/18/24 Office Visit Yo Mello, DO [facility]   09/09/24 Office Visit Yo Mello, DO [facility]   07/08/24 Office Visit Luis Oakley, APRN - CNP [facility]   03/07/24 Office Visit Yo Mello, DO [facility]   01/16/24 Office Visit Yo Mello, DO [facility]   12/21/23 Office Visit Yo Mello, DO [facility]   12/14/23 Office Visit Yo Mello, DO [facility]   09/07/23 Office Visit Yo Mello, DO [facility]   06/26/23 Office Visit Yo Mello, DO [facility]   Showing recent visits within past 540 days with a meds authorizing provider and meeting all other requirements  Future Appointments  Date Type Provider Dept   03/10/25 Appointment Yo Mello, DO [facility]   Showing future appointments within next 150 days with a meds authorizing provider and meeting all other requirements

## 2024-12-19 ENCOUNTER — OFFICE VISIT (OUTPATIENT)
Dept: FAMILY MEDICINE CLINIC | Age: 64
End: 2024-12-19
Payer: MEDICARE

## 2024-12-19 ENCOUNTER — PATIENT MESSAGE (OUTPATIENT)
Dept: FAMILY MEDICINE CLINIC | Age: 64
End: 2024-12-19

## 2024-12-19 VITALS
HEART RATE: 80 BPM | RESPIRATION RATE: 16 BRPM | BODY MASS INDEX: 30.33 KG/M2 | WEIGHT: 171.2 LBS | HEIGHT: 63 IN | DIASTOLIC BLOOD PRESSURE: 76 MMHG | TEMPERATURE: 97.7 F | SYSTOLIC BLOOD PRESSURE: 128 MMHG | OXYGEN SATURATION: 98 %

## 2024-12-19 DIAGNOSIS — J01.90 ACUTE RHINOSINUSITIS: Primary | ICD-10-CM

## 2024-12-19 LAB — STREPTOCOCCUS A RNA: NEGATIVE

## 2024-12-19 PROCEDURE — 3078F DIAST BP <80 MM HG: CPT | Performed by: FAMILY MEDICINE

## 2024-12-19 PROCEDURE — 1036F TOBACCO NON-USER: CPT | Performed by: FAMILY MEDICINE

## 2024-12-19 PROCEDURE — G8484 FLU IMMUNIZE NO ADMIN: HCPCS | Performed by: FAMILY MEDICINE

## 2024-12-19 PROCEDURE — 3017F COLORECTAL CA SCREEN DOC REV: CPT | Performed by: FAMILY MEDICINE

## 2024-12-19 PROCEDURE — G8399 PT W/DXA RESULTS DOCUMENT: HCPCS | Performed by: FAMILY MEDICINE

## 2024-12-19 PROCEDURE — 99213 OFFICE O/P EST LOW 20 MIN: CPT | Performed by: FAMILY MEDICINE

## 2024-12-19 PROCEDURE — G8417 CALC BMI ABV UP PARAM F/U: HCPCS | Performed by: FAMILY MEDICINE

## 2024-12-19 PROCEDURE — G8427 DOCREV CUR MEDS BY ELIG CLIN: HCPCS | Performed by: FAMILY MEDICINE

## 2024-12-19 PROCEDURE — 87651 STREP A DNA AMP PROBE: CPT | Performed by: FAMILY MEDICINE

## 2024-12-19 PROCEDURE — 1090F PRES/ABSN URINE INCON ASSESS: CPT | Performed by: FAMILY MEDICINE

## 2024-12-19 PROCEDURE — 1123F ACP DISCUSS/DSCN MKR DOCD: CPT | Performed by: FAMILY MEDICINE

## 2024-12-19 PROCEDURE — 3074F SYST BP LT 130 MM HG: CPT | Performed by: FAMILY MEDICINE

## 2024-12-19 RX ORDER — NAPROXEN 500 MG/1
500 TABLET ORAL 2 TIMES DAILY PRN
COMMUNITY
Start: 2024-11-18

## 2024-12-19 NOTE — TELEPHONE ENCOUNTER
Called patient and got her scheduled for an appointment today     Future Appointments   Date Time Provider Department Center   12/19/2024 11:40 AM Yo Mello, DO [facility] DEP   3/10/2025 12:40 PM Yo Mello, DO [facility] DEP

## 2024-12-19 NOTE — PROGRESS NOTES
respiratory distress or retractions  Cardiovascular: S1 and S2 auscultated w/ RRR. No murmurs, rubs, clicks, or gallops, distal pulses intact.  Abdomen: soft, non-tender, non-distended, bowel sounds physiologic,  no rebound or guarding, no masses or hernias noted. Liver and spleen without enlargement.   Extremities: no cyanosis, clubbing or edema of the lower extremities.   Skin: warm and dry, no rash or erythema    Results for orders placed or performed in visit on 12/19/24   POCT Rapid Strep A DNA (Alere i)   Result Value Ref Range    Streptococcus A RNA negative        ASSESSMENT & PLAN  1. Acute rhinosinusitis    F/u if no better    - POCT Rapid Strep A DNA (Alere i)  - Culture, Throat; Future  - amoxicillin-clavulanate (AUGMENTIN) 875-125 MG per tablet; Take 1 tablet by mouth 2 times daily for 10 days  Dispense: 20 tablet; Refill: 0  - Culture, Throat      DISPOSITION    Return if symptoms worsen or fail to improve.    Mchugh released without restrictions.    Future Appointments   Date Time Provider Department Center   3/10/2025 12:40 PM Yo Mello DO [facility] DEP     PATIENT COUNSELING    Barriers to learning and self management: none    Discussed use, benefit, and side effects of prescribed medications.  Barriers to medication compliance addressed.  All patient questions answered.  Pt voiced understanding.       Electronically signed by Yo Mello DO on 12/19/2024 at 12:05 PM

## 2024-12-20 DIAGNOSIS — N95.1 VASOMOTOR SYMPTOMS DUE TO MENOPAUSE: ICD-10-CM

## 2024-12-20 DIAGNOSIS — E89.40 SURGICAL MENOPAUSE: ICD-10-CM

## 2024-12-20 RX ORDER — CONJUGATED ESTROGENS 0.3 MG/1
TABLET, FILM COATED ORAL
Qty: 90 TABLET | Refills: 3 | Status: SHIPPED | OUTPATIENT
Start: 2024-12-20

## 2024-12-20 NOTE — TELEPHONE ENCOUNTER
Recent Visits  Date Type Provider Dept   12/19/24 Office Visit Yo Mello, DO [facility]   11/21/24 Office Visit Yo Mello, DO [facility]   10/18/24 Office Visit Yo Mello, DO [facility]   09/09/24 Office Visit Yo Mello, DO [facility]   07/08/24 Office Visit Luis Oakley, APRN - CNP [facility]   03/07/24 Office Visit Yo Mello, DO [facility]   01/16/24 Office Visit Yo Mello, DO [facility]   12/21/23 Office Visit Yo Mello, DO [facility]   12/14/23 Office Visit Yo Mello, DO [facility]   09/07/23 Office Visit Yo Mello, DO [facility]   Showing recent visits within past 540 days with a meds authorizing provider and meeting all other requirements  Future Appointments  Date Type Provider Dept   03/10/25 Appointment Yo Mello, DO [facility]   Showing future appointments within next 150 days with a meds authorizing provider and meeting all other requirements

## 2024-12-21 LAB — BACTERIA THROAT AEROBE CULT: NORMAL

## 2024-12-28 DIAGNOSIS — E06.3 HYPOTHYROIDISM DUE TO HASHIMOTO'S THYROIDITIS: Chronic | ICD-10-CM

## 2024-12-30 RX ORDER — LEVOTHYROXINE SODIUM 50 UG/1
50 TABLET ORAL DAILY
Qty: 90 TABLET | Refills: 3 | Status: SHIPPED | OUTPATIENT
Start: 2024-12-30

## 2025-01-14 DIAGNOSIS — K21.9 GASTROESOPHAGEAL REFLUX DISEASE, UNSPECIFIED WHETHER ESOPHAGITIS PRESENT: Primary | ICD-10-CM

## 2025-01-14 RX ORDER — PANTOPRAZOLE SODIUM 20 MG/1
20 TABLET, DELAYED RELEASE ORAL
Qty: 30 TABLET | Refills: 2 | Status: SHIPPED | OUTPATIENT
Start: 2025-01-14

## 2025-01-14 NOTE — TELEPHONE ENCOUNTER
Recent Visits  Date Type Provider Dept   12/19/24 Office Visit Yo Mello, DO [facility]   11/21/24 Office Visit Yo Mello, DO [facility]   10/18/24 Office Visit Yo Mello, DO [facility]   09/09/24 Office Visit Yo Mello, DO [facility]   07/08/24 Office Visit Luis Oakley, NINA - CNP [facility]   03/07/24 Office Visit Yo Mello, DO [facility]   01/16/24 Office Visit Yo Mello, DO [facility]   12/21/23 Office Visit Yo Mello, DO [facility]   12/14/23 Office Visit Yo Mello, DO [facility]   09/07/23 Office Visit Yo Mello, DO [facility]   Showing recent visits within past 540 days with a meds authorizing provider and meeting all other requirements  Future Appointments  Date Type Provider Dept   03/10/25 Appointment Yo Mello, DO [facility]   Showing future appointments within next 150 days with a meds authorizing provider and meeting all other requirements     Medication pended for review

## 2025-02-05 ENCOUNTER — OFFICE VISIT (OUTPATIENT)
Dept: FAMILY MEDICINE CLINIC | Age: 65
End: 2025-02-05
Payer: MEDICARE

## 2025-02-05 VITALS
TEMPERATURE: 97.7 F | HEART RATE: 76 BPM | SYSTOLIC BLOOD PRESSURE: 126 MMHG | HEIGHT: 63 IN | BODY MASS INDEX: 30.65 KG/M2 | DIASTOLIC BLOOD PRESSURE: 72 MMHG | RESPIRATION RATE: 16 BRPM | WEIGHT: 173 LBS | OXYGEN SATURATION: 99 %

## 2025-02-05 DIAGNOSIS — E11.9 TYPE 2 DIABETES MELLITUS WITHOUT COMPLICATION, WITHOUT LONG-TERM CURRENT USE OF INSULIN (HCC): ICD-10-CM

## 2025-02-05 DIAGNOSIS — L30.9 CHRONIC DERMATITIS OF HANDS: Primary | ICD-10-CM

## 2025-02-05 PROCEDURE — 2022F DILAT RTA XM EVC RTNOPTHY: CPT | Performed by: FAMILY MEDICINE

## 2025-02-05 PROCEDURE — G8399 PT W/DXA RESULTS DOCUMENT: HCPCS | Performed by: FAMILY MEDICINE

## 2025-02-05 PROCEDURE — 3046F HEMOGLOBIN A1C LEVEL >9.0%: CPT | Performed by: FAMILY MEDICINE

## 2025-02-05 PROCEDURE — 1036F TOBACCO NON-USER: CPT | Performed by: FAMILY MEDICINE

## 2025-02-05 PROCEDURE — 3074F SYST BP LT 130 MM HG: CPT | Performed by: FAMILY MEDICINE

## 2025-02-05 PROCEDURE — G8417 CALC BMI ABV UP PARAM F/U: HCPCS | Performed by: FAMILY MEDICINE

## 2025-02-05 PROCEDURE — 3078F DIAST BP <80 MM HG: CPT | Performed by: FAMILY MEDICINE

## 2025-02-05 PROCEDURE — 1090F PRES/ABSN URINE INCON ASSESS: CPT | Performed by: FAMILY MEDICINE

## 2025-02-05 PROCEDURE — 3017F COLORECTAL CA SCREEN DOC REV: CPT | Performed by: FAMILY MEDICINE

## 2025-02-05 PROCEDURE — 1123F ACP DISCUSS/DSCN MKR DOCD: CPT | Performed by: FAMILY MEDICINE

## 2025-02-05 PROCEDURE — G8427 DOCREV CUR MEDS BY ELIG CLIN: HCPCS | Performed by: FAMILY MEDICINE

## 2025-02-05 PROCEDURE — 99213 OFFICE O/P EST LOW 20 MIN: CPT | Performed by: FAMILY MEDICINE

## 2025-02-05 RX ORDER — PREDNISONE 10 MG/1
TABLET ORAL
Qty: 30 TABLET | Refills: 0 | Status: SHIPPED | OUTPATIENT
Start: 2025-02-05 | End: 2025-02-21

## 2025-02-05 SDOH — ECONOMIC STABILITY: FOOD INSECURITY: WITHIN THE PAST 12 MONTHS, YOU WORRIED THAT YOUR FOOD WOULD RUN OUT BEFORE YOU GOT MONEY TO BUY MORE.: NEVER TRUE

## 2025-02-05 SDOH — ECONOMIC STABILITY: FOOD INSECURITY: WITHIN THE PAST 12 MONTHS, THE FOOD YOU BOUGHT JUST DIDN'T LAST AND YOU DIDN'T HAVE MONEY TO GET MORE.: NEVER TRUE

## 2025-02-05 ASSESSMENT — PATIENT HEALTH QUESTIONNAIRE - PHQ9
SUM OF ALL RESPONSES TO PHQ QUESTIONS 1-9: 0
SUM OF ALL RESPONSES TO PHQ9 QUESTIONS 1 & 2: 0
SUM OF ALL RESPONSES TO PHQ QUESTIONS 1-9: 0
1. LITTLE INTEREST OR PLEASURE IN DOING THINGS: NOT AT ALL
2. FEELING DOWN, DEPRESSED OR HOPELESS: NOT AT ALL
SUM OF ALL RESPONSES TO PHQ QUESTIONS 1-9: 0
SUM OF ALL RESPONSES TO PHQ QUESTIONS 1-9: 0

## 2025-02-05 NOTE — PROGRESS NOTES
Chief Complaint   Patient presents with    Rash     Rash around bilateral wrists    Started 6-7 weeks ago  Itchy, painful      History obtained from the patient.    SUBJECTIVE:  Lolita Corona is a 64 y.o. female that presents today     -Rash:  Mostly on hands  Some dry skin around eyes  Has on hands, dry red and itchy  Has been doing petrolatum but only once daily and not after hand washing  Itchy  No new meds, soaps or detergents    Inciting events or exposures prior to rash starting? No  Pruritic?  Yes  Erythematous?  Yes  Weeping or drainage?  No  History of Urticaria?  No  Fever?  No  Painful?  No  New Detergent?  No        Age/Gender Health Maintenance    Lipid -   Lab Results   Component Value Date    CHOL 238 (H) 09/05/2024    CHOL 186 10/17/2023    CHOL 234 (H) 09/01/2023     Lab Results   Component Value Date    TRIG 166 (H) 09/05/2024    TRIG 156 (H) 10/17/2023    TRIG 167 (H) 09/01/2023     Lab Results   Component Value Date    HDL 81 09/05/2024    HDL 74 10/17/2023    HDL 77 09/01/2023     Lab Results   Component Value Date     (H) 09/05/2024    LDL 81 10/17/2023     (H) 09/01/2023       TSH -   Lab Results   Component Value Date    TSH 2.690 09/05/2024       Colon Cancer Screening - Small polyp OCT 2013, repeat 7 to 10 years, per Sheik DE LA CRUZ./Completed DEC 2024, records pending.   Lung Cancer Screening - never soker    Tetanus - UTD AUG 2021  Influenza Vaccine - UTD FALL 2024  Pneumonia Vaccine - UTD AUG 2021/UTD PCV 20 in FEB 2023  Zoster - UTD x 2    Breast Cancer Screening - NEG OCT 2024   Cervical Cancer Screening - hyst for benign reasons.   Osteoporosis Screening - neg June 2021/OCT 2024      Diabetes Health Maintenance    A1C -   Lab Results   Component Value Date/Time    LABA1C 6.8 09/09/2024 11:25 AM    LABA1C 6.6 03/07/2024 09:52 AM    LABA1C 6.5 09/07/2023 08:48 AM    LABA1C 6.1 02/09/2023 10:16 AM    LABA1C 6.1 02/08/2022 09:36 AM    LABA1C 6.3 08/05/2021 12:47 PM

## 2025-03-09 NOTE — PROGRESS NOTES
dysphagia  Sxs better the last few days  Never had this before  No pain today  Tums help some.      Current therapy and response:  tums, helps  Symptoms associated with certain foods?  No  Alcohol use?   No  Tobacco use?   No     Abdominal Pain?   No  Mid Back Pain?   No  Melena?   No     UPDATE LAST VISIT:   Sxs unchanged, but hasn't started PAntoprazole yet  Did see GI. Is getting colo done soon and then EGD  Is willing to start pantoprazole  No dysphagia.      UPDATE TODAY:   GERD sxs improved  On Pantoprazole  Did see GI. Had colo done. Pt was set to do EGD, but then cancelled  At this point, since sxs are better, is going to hold off  Is current with GI  Denies dysphagia.        -OA hands:  Previous w/u c/w OA in hands with xrays and labs  Started on scheduled tylenol  Working well      -Hand dermatitis:  Improved with moisture trapping and adjusting how she washes her hands.       Age/Gender Health Maintenance    Lipid -   Lab Results   Component Value Date    CHOL 238 (H) 09/05/2024    CHOL 186 10/17/2023    CHOL 234 (H) 09/01/2023     Lab Results   Component Value Date    TRIG 166 (H) 09/05/2024    TRIG 156 (H) 10/17/2023    TRIG 167 (H) 09/01/2023     Lab Results   Component Value Date    HDL 81 09/05/2024    HDL 74 10/17/2023    HDL 77 09/01/2023     Lab Results   Component Value Date     (H) 09/05/2024    LDL 81 10/17/2023     (H) 09/01/2023       TSH -   Lab Results   Component Value Date    TSH 2.690 09/05/2024       Colon Cancer Screening - + T/A in DEC 2024, repeat 3 years per Sheikh DE LA CRUZ.  Lung Cancer Screening - never soker    Tetanus - UTD AUG 2021  Influenza Vaccine - UTD FALL 2024  Pneumonia Vaccine - UTD AUG 2021/UTD PCV 20 in FEB 2023  Zoster - UTD x 2    Breast Cancer Screening - NEG OCT 2024   Cervical Cancer Screening - hyst for benign reasons.   Osteoporosis Screening - neg June 2021/OCT 2024      Diabetes Health Maintenance    A1C -   Lab Results   Component Value Date/Time

## 2025-03-10 ENCOUNTER — OFFICE VISIT (OUTPATIENT)
Dept: FAMILY MEDICINE CLINIC | Age: 65
End: 2025-03-10
Payer: MEDICARE

## 2025-03-10 VITALS
BODY MASS INDEX: 30.09 KG/M2 | WEIGHT: 169.8 LBS | OXYGEN SATURATION: 98 % | DIASTOLIC BLOOD PRESSURE: 70 MMHG | TEMPERATURE: 97.7 F | HEART RATE: 76 BPM | HEIGHT: 63 IN | RESPIRATION RATE: 16 BRPM | SYSTOLIC BLOOD PRESSURE: 130 MMHG

## 2025-03-10 DIAGNOSIS — M19.042 PRIMARY OSTEOARTHRITIS OF BOTH HANDS: ICD-10-CM

## 2025-03-10 DIAGNOSIS — E53.8 B12 DEFICIENCY: ICD-10-CM

## 2025-03-10 DIAGNOSIS — L30.9 CHRONIC DERMATITIS OF HANDS: ICD-10-CM

## 2025-03-10 DIAGNOSIS — E11.9 TYPE 2 DIABETES MELLITUS WITHOUT COMPLICATION, WITHOUT LONG-TERM CURRENT USE OF INSULIN (HCC): Primary | ICD-10-CM

## 2025-03-10 DIAGNOSIS — N95.1 VASOMOTOR SYMPTOMS DUE TO MENOPAUSE: ICD-10-CM

## 2025-03-10 DIAGNOSIS — I10 HYPERTENSION, ESSENTIAL: Chronic | ICD-10-CM

## 2025-03-10 DIAGNOSIS — E06.3 HYPOTHYROIDISM DUE TO HASHIMOTO THYROIDITIS: Chronic | ICD-10-CM

## 2025-03-10 DIAGNOSIS — E78.5 DYSLIPIDEMIA: Chronic | ICD-10-CM

## 2025-03-10 DIAGNOSIS — M19.041 PRIMARY OSTEOARTHRITIS OF BOTH HANDS: ICD-10-CM

## 2025-03-10 DIAGNOSIS — K21.9 GASTROESOPHAGEAL REFLUX DISEASE, UNSPECIFIED WHETHER ESOPHAGITIS PRESENT: ICD-10-CM

## 2025-03-10 LAB — HBA1C MFR BLD: 6.7 % (ref 4.3–5.7)

## 2025-03-10 PROCEDURE — 3044F HG A1C LEVEL LT 7.0%: CPT | Performed by: FAMILY MEDICINE

## 2025-03-10 PROCEDURE — 99214 OFFICE O/P EST MOD 30 MIN: CPT | Performed by: FAMILY MEDICINE

## 2025-03-10 PROCEDURE — 1036F TOBACCO NON-USER: CPT | Performed by: FAMILY MEDICINE

## 2025-03-10 PROCEDURE — 3017F COLORECTAL CA SCREEN DOC REV: CPT | Performed by: FAMILY MEDICINE

## 2025-03-10 PROCEDURE — 1123F ACP DISCUSS/DSCN MKR DOCD: CPT | Performed by: FAMILY MEDICINE

## 2025-03-10 PROCEDURE — 3078F DIAST BP <80 MM HG: CPT | Performed by: FAMILY MEDICINE

## 2025-03-10 PROCEDURE — G8427 DOCREV CUR MEDS BY ELIG CLIN: HCPCS | Performed by: FAMILY MEDICINE

## 2025-03-10 PROCEDURE — G8417 CALC BMI ABV UP PARAM F/U: HCPCS | Performed by: FAMILY MEDICINE

## 2025-03-10 PROCEDURE — G8399 PT W/DXA RESULTS DOCUMENT: HCPCS | Performed by: FAMILY MEDICINE

## 2025-03-10 PROCEDURE — 3075F SYST BP GE 130 - 139MM HG: CPT | Performed by: FAMILY MEDICINE

## 2025-03-10 PROCEDURE — 1090F PRES/ABSN URINE INCON ASSESS: CPT | Performed by: FAMILY MEDICINE

## 2025-03-10 PROCEDURE — 2022F DILAT RTA XM EVC RTNOPTHY: CPT | Performed by: FAMILY MEDICINE

## 2025-04-10 ENCOUNTER — PATIENT MESSAGE (OUTPATIENT)
Dept: FAMILY MEDICINE CLINIC | Age: 65
End: 2025-04-10

## 2025-04-10 NOTE — TELEPHONE ENCOUNTER
Future Appointments   Date Time Provider Department Center   4/11/2025 11:20 AM Roberta Adams APRN - CNP [facility]   9/11/2025 11:00 AM Yo Mello DO [facility] DEP     Called and spoke to patient, got her scheduled for VV with Radha

## 2025-04-11 ENCOUNTER — TELEMEDICINE (OUTPATIENT)
Dept: FAMILY MEDICINE CLINIC | Age: 65
End: 2025-04-11

## 2025-04-11 DIAGNOSIS — J01.90 ACUTE NON-RECURRENT SINUSITIS, UNSPECIFIED LOCATION: ICD-10-CM

## 2025-04-11 DIAGNOSIS — H00.015 HORDEOLUM EXTERNUM OF LEFT LOWER EYELID: Primary | ICD-10-CM

## 2025-04-11 RX ORDER — ERYTHROMYCIN 5 MG/G
OINTMENT OPHTHALMIC EVERY 6 HOURS
Qty: 1 EACH | Refills: 0 | Status: SHIPPED | OUTPATIENT
Start: 2025-04-11

## 2025-04-11 NOTE — PROGRESS NOTES
audio-video encounter. The patient (or guardian if applicable) is aware that this is a billable service, which includes applicable co-pays. This Virtual Visit was conducted with patient's (and/or legal guardian's) consent. Patient identification was verified, and a caregiver was present when appropriate.   The patient was located at Home: [address] [place] [address]  Provider was located at Facility (Appt Dept): [address] [place] [address] [place] [address]  Confirm you are appropriately licensed, registered, or certified to deliver care in the state where the patient is located as indicated above. If you are not or unsure, please re-schedule the visit: Yes, I confirm.       Total time spent on this encounter: Not billed by time    --NINA Claros CNP on 4/11/2025 at 12:24 PM    An electronic signature was used to authenticate this note.

## 2025-04-15 DIAGNOSIS — E11.59 TYPE 2 DIABETES MELLITUS WITH OTHER CIRCULATORY COMPLICATION, WITHOUT LONG-TERM CURRENT USE OF INSULIN (HCC): ICD-10-CM

## 2025-04-15 DIAGNOSIS — E78.5 HYPERLIPIDEMIA, UNSPECIFIED HYPERLIPIDEMIA TYPE: ICD-10-CM

## 2025-04-15 RX ORDER — ALIROCUMAB 75 MG/ML
INJECTION, SOLUTION SUBCUTANEOUS
Qty: 6 ML | Refills: 3 | OUTPATIENT
Start: 2025-04-15

## 2025-04-15 NOTE — TELEPHONE ENCOUNTER
Mchugh is requesting a refill of their   Requested Prescriptions     Pending Prescriptions Disp Refills    alirocumab (PRALUENT) 75 MG/ML SOAJ injection pen 6 mL 3   . Please advise.      Last Appt:  Visit date not found  Next Appt:   Visit date not found  Preferred pharmacy:     [facility] ([facility]) #[id number] - [address] [phone] -  [phone]

## 2025-04-16 DIAGNOSIS — K21.9 GASTROESOPHAGEAL REFLUX DISEASE, UNSPECIFIED WHETHER ESOPHAGITIS PRESENT: ICD-10-CM

## 2025-04-16 RX ORDER — PANTOPRAZOLE SODIUM 20 MG/1
20 TABLET, DELAYED RELEASE ORAL
Qty: 30 TABLET | Refills: 3 | Status: SHIPPED | OUTPATIENT
Start: 2025-04-16

## 2025-04-16 NOTE — TELEPHONE ENCOUNTER
Recent Visits  Date Type Provider Dept   03/10/25 Office Visit Yo Mello, DO [facility]   02/05/25 Office Visit Yo Mello, DO [facility]   12/19/24 Office Visit Yo Mello, DO [facility]   11/21/24 Office Visit Yo Mello, DO [facility]   10/18/24 Office Visit Yo Mello, DO [facility]   09/09/24 Office Visit Yo Mello, DO [facility]   07/08/24 Office Visit Luis Oakley, APRN - CNP [facility]   03/07/24 Office Visit Yo Mello, DO [facility]   01/16/24 Office Visit Yo Mello, DO [facility]   12/21/23 Office Visit Yo Mello, DO [facility]   Showing recent visits within past 540 days with a meds authorizing provider and meeting all other requirements  Future Appointments  Date Type Provider Dept   09/11/25 Appointment Yo Mello, DO [facility]   Showing future appointments within next 150 days with a meds authorizing provider and meeting all other requirements

## 2025-04-22 ENCOUNTER — PATIENT MESSAGE (OUTPATIENT)
Dept: FAMILY MEDICINE CLINIC | Age: 65
End: 2025-04-22

## 2025-04-22 DIAGNOSIS — E11.59 TYPE 2 DIABETES MELLITUS WITH OTHER CIRCULATORY COMPLICATION, WITHOUT LONG-TERM CURRENT USE OF INSULIN (HCC): ICD-10-CM

## 2025-04-22 DIAGNOSIS — Z78.9 STATIN INTOLERANCE: Chronic | ICD-10-CM

## 2025-04-22 DIAGNOSIS — E78.5 DYSLIPIDEMIA: Primary | ICD-10-CM

## 2025-04-22 RX ORDER — ALIROCUMAB 75 MG/ML
75 INJECTION, SOLUTION SUBCUTANEOUS
Qty: 6 ML | Refills: 3 | Status: SHIPPED | OUTPATIENT
Start: 2025-04-22

## 2025-04-22 NOTE — TELEPHONE ENCOUNTER
Recent Visits  Date Type Provider Dept   03/10/25 Office Visit Yo Mello, DO [facility]   02/05/25 Office Visit Yo Mello, DO [facility]   12/19/24 Office Visit Yo Mello, DO [facility]   11/21/24 Office Visit Yo Mello, DO [facility]   10/18/24 Office Visit Yo Mlelo, DO [facility]   09/09/24 Office Visit Yo Mello, DO [facility]   07/08/24 Office Visit Luis Oakley, APRN - CNP [facility]   03/07/24 Office Visit Yo Mello, DO [facility]   01/16/24 Office Visit Yo Mello, DO [facility]   12/21/23 Office Visit Yo Mello, DO [facility]   Showing recent visits within past 540 days with a meds authorizing provider and meeting all other requirements  Future Appointments  Date Type Provider Dept   09/11/25 Appointment Yo Mello, DO [facility]   Showing future appointments within next 150 days with a meds authorizing provider and meeting all other requirements

## 2025-05-15 ENCOUNTER — OFFICE VISIT (OUTPATIENT)
Dept: FAMILY MEDICINE CLINIC | Age: 65
End: 2025-05-15

## 2025-05-15 VITALS
RESPIRATION RATE: 16 BRPM | BODY MASS INDEX: 30.44 KG/M2 | OXYGEN SATURATION: 98 % | HEART RATE: 74 BPM | SYSTOLIC BLOOD PRESSURE: 130 MMHG | DIASTOLIC BLOOD PRESSURE: 70 MMHG | WEIGHT: 171.8 LBS | HEIGHT: 63 IN | TEMPERATURE: 97.7 F

## 2025-05-15 DIAGNOSIS — L72.0 EIC (EPIDERMAL INCLUSION CYST): Primary | ICD-10-CM

## 2025-05-15 RX ORDER — DOXYCYCLINE HYCLATE 100 MG
100 TABLET ORAL 2 TIMES DAILY
Qty: 20 TABLET | Refills: 0 | Status: SHIPPED | OUTPATIENT
Start: 2025-05-15 | End: 2025-05-25

## 2025-05-15 NOTE — PROGRESS NOTES
Chief Complaint   Patient presents with    Mass     Sore   Got bigger lately     History obtained from the patient.    SUBJECTIVE:  Lolita Corona is a 64 y.o. female that presents today     -Lump back of head:  Been there for months  Larger the last few wks  Mild pain, mostly when touched.   No warmth  No drainage  No fever      Age/Gender Health Maintenance    Lipid -   Lab Results   Component Value Date    CHOL 238 (H) 09/05/2024    CHOL 186 10/17/2023    CHOL 234 (H) 09/01/2023     Lab Results   Component Value Date    TRIG 166 (H) 09/05/2024    TRIG 156 (H) 10/17/2023    TRIG 167 (H) 09/01/2023     Lab Results   Component Value Date    HDL 81 09/05/2024    HDL 74 10/17/2023    HDL 77 09/01/2023     Lab Results   Component Value Date     (H) 09/05/2024    LDL 81 10/17/2023     (H) 09/01/2023       TSH -   Lab Results   Component Value Date    TSH 2.690 09/05/2024       Colon Cancer Screening - + T/A in DEC 2024, repeat 3 years per Sheikh DE LA CRUZ.  Lung Cancer Screening - never soker    Tetanus - UTD AUG 2021  Influenza Vaccine - UTD FALL 2024  Pneumonia Vaccine - UTD AUG 2021/UTD PCV 20 in FEB 2023  Zoster - UTD x 2    Breast Cancer Screening - NEG OCT 2024   Cervical Cancer Screening - hyst for benign reasons.   Osteoporosis Screening - neg June 2021/OCT 2024      Diabetes Health Maintenance    A1C -   Lab Results   Component Value Date/Time    LABA1C 6.7 03/10/2025 11:00 AM    LABA1C 6.8 09/09/2024 11:25 AM    LABA1C 6.6 03/07/2024 09:52 AM    LABA1C 6.5 09/07/2023 08:48 AM    LABA1C 6.1 02/09/2023 10:16 AM    LABA1C 6.1 02/08/2022 09:36 AM    LABA1C 7.1 05/13/2021 12:00 AM       ACE/ARB - yes, cozaar  Eye - UTD JAN 2023  Foot - UTD SEPT 2024    Microal/Cr -   Lab Results   Component Value Date    ALBUMINUR 2.7 09/05/2024    LABCREAU 215.8 02/08/2022    ALBCREAT 13 09/05/2024     Lab Results   Component Value Date    CREATINEUR 208.0 09/05/2024    MICROALBUR < 1.20 02/08/2022    MALBCR 6 02/08/2022

## 2025-05-21 ENCOUNTER — PATIENT MESSAGE (OUTPATIENT)
Dept: FAMILY MEDICINE CLINIC | Age: 65
End: 2025-05-21

## 2025-05-21 DIAGNOSIS — T78.40XA ALLERGIC REACTION, INITIAL ENCOUNTER: Primary | ICD-10-CM

## 2025-05-21 RX ORDER — PREDNISONE 10 MG/1
TABLET ORAL
Qty: 30 TABLET | Refills: 0 | Status: SHIPPED | OUTPATIENT
Start: 2025-05-21 | End: 2025-06-06

## 2025-05-22 ENCOUNTER — OFFICE VISIT (OUTPATIENT)
Dept: SURGERY | Age: 65
End: 2025-05-22
Payer: MEDICARE

## 2025-05-22 VITALS
WEIGHT: 174 LBS | HEIGHT: 62 IN | HEART RATE: 68 BPM | TEMPERATURE: 96.8 F | BODY MASS INDEX: 32.02 KG/M2 | OXYGEN SATURATION: 97 % | SYSTOLIC BLOOD PRESSURE: 136 MMHG | RESPIRATION RATE: 18 BRPM | DIASTOLIC BLOOD PRESSURE: 84 MMHG

## 2025-05-22 DIAGNOSIS — L72.11 PILAR CYST: Primary | ICD-10-CM

## 2025-05-22 PROCEDURE — 1036F TOBACCO NON-USER: CPT | Performed by: SURGERY

## 2025-05-22 PROCEDURE — 99203 OFFICE O/P NEW LOW 30 MIN: CPT | Performed by: SURGERY

## 2025-05-22 PROCEDURE — G8399 PT W/DXA RESULTS DOCUMENT: HCPCS | Performed by: SURGERY

## 2025-05-22 PROCEDURE — 3017F COLORECTAL CA SCREEN DOC REV: CPT | Performed by: SURGERY

## 2025-05-22 PROCEDURE — G8417 CALC BMI ABV UP PARAM F/U: HCPCS | Performed by: SURGERY

## 2025-05-22 PROCEDURE — 1125F AMNT PAIN NOTED PAIN PRSNT: CPT | Performed by: SURGERY

## 2025-05-22 PROCEDURE — 1159F MED LIST DOCD IN RCRD: CPT | Performed by: SURGERY

## 2025-05-22 PROCEDURE — 1090F PRES/ABSN URINE INCON ASSESS: CPT | Performed by: SURGERY

## 2025-05-22 PROCEDURE — 1123F ACP DISCUSS/DSCN MKR DOCD: CPT | Performed by: SURGERY

## 2025-05-22 PROCEDURE — G8427 DOCREV CUR MEDS BY ELIG CLIN: HCPCS | Performed by: SURGERY

## 2025-05-22 PROCEDURE — 3075F SYST BP GE 130 - 139MM HG: CPT | Performed by: SURGERY

## 2025-05-22 PROCEDURE — 3079F DIAST BP 80-89 MM HG: CPT | Performed by: SURGERY

## 2025-05-22 RX ORDER — MELOXICAM 15 MG/1
TABLET ORAL
COMMUNITY
Start: 2025-05-16

## 2025-05-25 DIAGNOSIS — E78.5 HYPERLIPIDEMIA, UNSPECIFIED HYPERLIPIDEMIA TYPE: ICD-10-CM

## 2025-05-27 RX ORDER — EZETIMIBE 10 MG/1
10 TABLET ORAL DAILY
Qty: 90 TABLET | Refills: 3 | Status: SHIPPED | OUTPATIENT
Start: 2025-05-27

## 2025-05-27 NOTE — TELEPHONE ENCOUNTER
Recent Visits  Date Type Provider Dept   05/15/25 Office Visit Yo Mello, DO [facility]   03/10/25 Office Visit Yo Mello, DO [facility]   02/05/25 Office Visit Yo Mello, DO [facility]   12/19/24 Office Visit Yo Mello, DO [facility]   11/21/24 Office Visit Yo Mello, DO [facility]   10/18/24 Office Visit Yo Mello, DO [facility]   09/09/24 Office Visit Yo Mello, DO [facility]   07/08/24 Office Visit Luis Oakley, NINA - CNP [facility]   03/07/24 Office Visit Yo Mello, DO [facility]   01/16/24 Office Visit Yo Mello, DO [facility]   Showing recent visits within past 540 days with a meds authorizing provider and meeting all other requirements  Future Appointments  Date Type Provider Dept   09/11/25 Appointment Yo Mello, DO [facility]   Showing future appointments within next 150 days with a meds authorizing provider and meeting all other requirements

## 2025-05-28 ASSESSMENT — ENCOUNTER SYMPTOMS: GASTROINTESTINAL NEGATIVE: 1

## 2025-05-28 NOTE — PROGRESS NOTES
Nisha Aguiar MD  [facility]    Pt Name: Lolita Corona  MRN: [id number]  YOB: 1960  Date of evaluation: 05/22/2025    Primary Care Physician: Yo Mello DO  Patient evaluated at the request of  Dr. Mello   Reason for evaluation: cyst   ASSESSMENT and PLAN:     Assessment & Plan  1. Pilar cyst.  The cyst will not resolve spontaneously and will persist indefinitely unless surgically excised. The procedure for removal was discussed, including the necessity to avoid excision during periods of inflammation due to the potential for incomplete removal and subsequent recurrence.  It is recommended to consider removal when the cyst is not inflamed. If excision is desired, the procedure can be performed in the office with a small patch of hair shaved and the cyst cut out.    Patient Active Problem List   Diagnosis    Dyslipidemia    Hypertension, essential    Vasomotor symptoms due to menopause    Statin intolerance    Chronic venous insufficiency of lower extremity    DM2 (diabetes mellitus, type 2) (HCC)    Obesity (BMI 30-39.9)    Hypothyroidism    B12 deficiency        SUBJECTIVE:   CC:bump on head    History of Chief Complaint:    History of Present Illness  The patient presents for evaluation of a bump on her scalp.    She was referred by Dr. Rk Coughlin. She has been living with a small, pimple-like protrusion on her scalp for the majority of her life. Recently, she has been unable to resist the urge to manipulate it, which has resulted in an increase in size and associated discomfort. Despite attempts to leave it undisturbed, the growth continued to enlarge and become more painful, prompting her to seek medical attention.       Past Medical History  Past Medical History:   Diagnosis Date    Anxiety lifetime    Chronic venous insufficiency of lower extremity 08/05/2021    DM2 (diabetes mellitus, type 2) ([facility])     Dyslipidemia     Hypertension, essential     Hypothyroidism      Patient notified of results of blood work via [address] [place] [address].

## 2025-07-02 ENCOUNTER — OFFICE VISIT (OUTPATIENT)
Dept: FAMILY MEDICINE CLINIC | Age: 65
End: 2025-07-02

## 2025-07-02 VITALS
SYSTOLIC BLOOD PRESSURE: 132 MMHG | HEIGHT: 62 IN | WEIGHT: 169.4 LBS | RESPIRATION RATE: 16 BRPM | BODY MASS INDEX: 31.17 KG/M2 | TEMPERATURE: 97.7 F | DIASTOLIC BLOOD PRESSURE: 82 MMHG | OXYGEN SATURATION: 99 % | HEART RATE: 84 BPM

## 2025-07-02 DIAGNOSIS — H00.014 HORDEOLUM EXTERNUM LEFT UPPER EYELID: Primary | ICD-10-CM

## 2025-07-02 RX ORDER — ERYTHROMYCIN 5 MG/G
OINTMENT OPHTHALMIC
Qty: 3.5 G | Refills: 0 | Status: SHIPPED | OUTPATIENT
Start: 2025-07-02 | End: 2025-07-12

## 2025-07-02 NOTE — PROGRESS NOTES
Chief Complaint   Patient presents with    Other     Left eye red and swollen   Painful  Going on for a week     History obtained from the patient.    SUBJECTIVE:  Lolita Corona is a 65 y.o. female that presents today     -Left Eye Complaint:    HPI:   Pain and swelling L upper eyelid x 1 wk  Vision is fine  Allergies worse  Eyelid is painful  No fever  No pain with ocular movement  No ocular involvement    Redness - only of L upper eyelid  Burning - No  Matting or Drainage - No  Changes in vision - No  Painful - yes, left upper eye lid  Photophobia - No  Inciting Event or Trauma - No  Associated Headache - No    Vision Screening  Edited by: Zofia Vivas MA        Right eye Left eye Both eyes    Without correction 20/25 20/20 20/20          Vision Screening on 9/9/2024  Edited by: Zofia Vivas MA        Right eye Left eye Both eyes    Without correction 20/100 20/13 20/13            Age/Gender Health Maintenance    Lipid -   Lab Results   Component Value Date    CHOL 238 (H) 09/05/2024    CHOL 186 10/17/2023    CHOL 234 (H) 09/01/2023     Lab Results   Component Value Date    TRIG 166 (H) 09/05/2024    TRIG 156 (H) 10/17/2023    TRIG 167 (H) 09/01/2023     Lab Results   Component Value Date    HDL 81 09/05/2024    HDL 74 10/17/2023    HDL 77 09/01/2023     Lab Results   Component Value Date     (H) 09/05/2024    LDL 81 10/17/2023     (H) 09/01/2023       TSH -   Lab Results   Component Value Date    TSH 2.690 09/05/2024       Colon Cancer Screening - + T/A in DEC 2024, repeat 3 years per Sheikh DE LA CRUZ.  Lung Cancer Screening - never soker    Tetanus - UTD AUG 2021  Influenza Vaccine - UTD FALL 2024  Pneumonia Vaccine - UTD AUG 2021/UTD PCV 20 in FEB 2023  Zoster - UTD x 2    Breast Cancer Screening - NEG OCT 2024   Cervical Cancer Screening - hyst for benign reasons.   Osteoporosis Screening - neg June 2021/OCT 2024      Diabetes Health Maintenance    A1C -   Lab Results   Component Value

## 2025-07-13 DIAGNOSIS — E11.9 CONTROLLED TYPE 2 DIABETES MELLITUS WITHOUT COMPLICATION, WITHOUT LONG-TERM CURRENT USE OF INSULIN (HCC): ICD-10-CM

## 2025-07-14 NOTE — TELEPHONE ENCOUNTER
Recent Visits  Date Type Provider Dept   07/02/25 Office Visit Yo Mello, DO [facility]   05/15/25 Office Visit Yo Mello, DO [facility]   03/10/25 Office Visit Yo Mello, DO [facility]   02/05/25 Office Visit Yo Mello, DO [facility]   12/19/24 Office Visit Yo Mello, DO [facility]   11/21/24 Office Visit Yo Mello, DO [facility]   10/18/24 Office Visit Yo Mello, DO [facility]   09/09/24 Office Visit Yo Mello, DO [facility]   07/08/24 Office Visit Luis Oakley, NINA - CNP [facility]   03/07/24 Office Visit Yo Mello, DO [facility]   Showing recent visits within past 540 days with a meds authorizing provider and meeting all other requirements  Future Appointments  Date Type Provider Dept   09/11/25 Appointment Yo Mello, DO [facility]   Showing future appointments within next 150 days with a meds authorizing provider and meeting all other requirements

## 2025-07-17 ENCOUNTER — TELEPHONE (OUTPATIENT)
Dept: FAMILY MEDICINE CLINIC | Age: 65
End: 2025-07-17

## 2025-07-17 DIAGNOSIS — H00.014 HORDEOLUM EXTERNUM LEFT UPPER EYELID: Primary | ICD-10-CM

## 2025-07-17 RX ORDER — CEFDINIR 300 MG/1
300 CAPSULE ORAL 2 TIMES DAILY
Qty: 14 CAPSULE | Refills: 0 | Status: SHIPPED | OUTPATIENT
Start: 2025-07-17 | End: 2025-07-24

## 2025-07-17 RX ORDER — ERYTHROMYCIN 5 MG/G
OINTMENT OPHTHALMIC
Qty: 3.5 G | Refills: 0 | Status: SHIPPED | OUTPATIENT
Start: 2025-07-17 | End: 2025-07-27

## 2025-07-17 NOTE — TELEPHONE ENCOUNTER
Pt informed of medication sent in . Pt voiced understanding with no further questions at this time.

## 2025-07-17 NOTE — TELEPHONE ENCOUNTER
Patient called in and said she seen you a few weeks back for stye on left eye. She now has stye on right eye, very swollen and painful. Works all weekend and there was no more open availability today   Please advise   Pharmacy is [facility]

## 2025-07-17 NOTE — TELEPHONE ENCOUNTER
F/u if no better     Diagnosis Orders   1. Hordeolum externum left upper eyelid  erythromycin (ROMYCIN) 5 MG/GM ophthalmic ointment    cefdinir (OMNICEF) 300 MG capsule

## 2025-07-18 ENCOUNTER — OFFICE VISIT (OUTPATIENT)
Dept: FAMILY MEDICINE CLINIC | Age: 65
End: 2025-07-18

## 2025-07-18 VITALS
HEIGHT: 62 IN | BODY MASS INDEX: 31.39 KG/M2 | WEIGHT: 170.6 LBS | SYSTOLIC BLOOD PRESSURE: 134 MMHG | HEART RATE: 69 BPM | OXYGEN SATURATION: 98 % | TEMPERATURE: 97.8 F | RESPIRATION RATE: 16 BRPM | DIASTOLIC BLOOD PRESSURE: 74 MMHG

## 2025-07-18 DIAGNOSIS — T78.40XA ALLERGIC REACTION, INITIAL ENCOUNTER: ICD-10-CM

## 2025-07-18 DIAGNOSIS — H00.011 HORDEOLUM EXTERNUM OF RIGHT UPPER EYELID: Primary | ICD-10-CM

## 2025-07-18 RX ORDER — PREDNISONE 20 MG/1
40 TABLET ORAL DAILY
Qty: 10 TABLET | Refills: 0 | Status: SHIPPED | OUTPATIENT
Start: 2025-07-18 | End: 2025-07-23

## 2025-07-18 NOTE — PROGRESS NOTES
Chief Complaint   Patient presents with    Stye    Allergic Reaction     History obtained from the patient.    SUBJECTIVE:  Lolita Corona is a 65 y.o. female that presents today     -Right Eye Complaint:    HPI:   Started yesterday  Stye in R upper eye lid  Red and painful  Vision fine  Started on cefdinir/erythromycin OP yesterday  Pt felt it's worse today, also thinks is having inc allergies  Did take dose of oral ATB and topical  Has not been doing warm compress.   No fever  No pain with eye movement.       Age/Gender Health Maintenance    Lipid -   Lab Results   Component Value Date    CHOL 238 (H) 09/05/2024    CHOL 186 10/17/2023    CHOL 234 (H) 09/01/2023     Lab Results   Component Value Date    TRIG 166 (H) 09/05/2024    TRIG 156 (H) 10/17/2023    TRIG 167 (H) 09/01/2023     Lab Results   Component Value Date    HDL 81 09/05/2024    HDL 74 10/17/2023    HDL 77 09/01/2023     Lab Results   Component Value Date     (H) 09/05/2024    LDL 81 10/17/2023     (H) 09/01/2023       TSH -   Lab Results   Component Value Date    TSH 2.690 09/05/2024       Colon Cancer Screening - + T/A in DEC 2024, repeat 3 years per Sheikh DE LA CRUZ.  Lung Cancer Screening - never sozachary    Tetanus - UTD AUG 2021  Influenza Vaccine - UTD FALL 2024  Pneumonia Vaccine - UTD AUG 2021/UTD PCV 20 in FEB 2023  Zoster - UTD x 2    Breast Cancer Screening - NEG OCT 2024   Cervical Cancer Screening - hyst for benign reasons.   Osteoporosis Screening - neg June 2021/OCT 2024      Diabetes Health Maintenance    A1C -   Lab Results   Component Value Date/Time    LABA1C 6.7 03/10/2025 11:00 AM    LABA1C 6.8 09/09/2024 11:25 AM    LABA1C 6.6 03/07/2024 09:52 AM    LABA1C 6.5 09/07/2023 08:48 AM    LABA1C 6.1 02/09/2023 10:16 AM    LABA1C 6.1 02/08/2022 09:36 AM    LABA1C 7.1 05/13/2021 12:00 AM       ACE/ARB - yes, cozaar  Eye - UTD JAN 2023  Foot - UTD SEPT 2024    Microal/Cr -   Lab Results   Component Value Date    ALBUMINUR 2.7

## 2025-08-13 ENCOUNTER — TELEPHONE (OUTPATIENT)
Dept: FAMILY MEDICINE CLINIC | Age: 65
End: 2025-08-13

## 2025-08-13 DIAGNOSIS — E53.8 B12 DEFICIENCY: ICD-10-CM

## 2025-08-13 DIAGNOSIS — E11.9 TYPE 2 DIABETES MELLITUS WITHOUT COMPLICATION, WITHOUT LONG-TERM CURRENT USE OF INSULIN (HCC): Primary | ICD-10-CM

## (undated) DEVICE — GLOVE ORANGE PI 7 1/2   MSG9075

## (undated) DEVICE — PHACOEMULSIFICATION PACK COMPACT

## (undated) DEVICE — SOLUTION IV 1000ML 0.9% SOD CHL PH 5 INJ USP VIAFLX PLAS